# Patient Record
Sex: FEMALE | Race: WHITE | NOT HISPANIC OR LATINO | RURAL
[De-identification: names, ages, dates, MRNs, and addresses within clinical notes are randomized per-mention and may not be internally consistent; named-entity substitution may affect disease eponyms.]

---

## 2017-10-04 ENCOUNTER — OFFICE (OUTPATIENT)
Dept: RURAL CLINIC 3 | Facility: CLINIC | Age: 47
End: 2017-10-04
Payer: COMMERCIAL

## 2017-10-04 VITALS
HEIGHT: 70 IN | HEART RATE: 90 BPM | DIASTOLIC BLOOD PRESSURE: 76 MMHG | SYSTOLIC BLOOD PRESSURE: 113 MMHG | WEIGHT: 199 LBS

## 2017-10-04 DIAGNOSIS — R12 HEARTBURN: ICD-10-CM

## 2017-10-04 DIAGNOSIS — R13.10 DYSPHAGIA, UNSPECIFIED: ICD-10-CM

## 2017-10-04 DIAGNOSIS — R10.13 EPIGASTRIC PAIN: ICD-10-CM

## 2017-10-04 PROCEDURE — 99203 OFFICE O/P NEW LOW 30 MIN: CPT | Performed by: NURSE PRACTITIONER

## 2017-10-04 RX ORDER — RANITIDINE 300 MG/1
300 TABLET ORAL
Qty: 30 | Refills: 11 | Status: ACTIVE
Start: 2017-10-04

## 2017-11-01 ENCOUNTER — ON CAMPUS - OUTPATIENT (OUTPATIENT)
Dept: RURAL HOSPITAL 3 | Facility: HOSPITAL | Age: 47
End: 2017-11-01
Payer: COMMERCIAL

## 2017-11-01 DIAGNOSIS — R13.10 DYSPHAGIA, UNSPECIFIED: ICD-10-CM

## 2017-11-01 DIAGNOSIS — K21.9 GASTRO-ESOPHAGEAL REFLUX DISEASE WITHOUT ESOPHAGITIS: ICD-10-CM

## 2017-11-01 DIAGNOSIS — K22.8 OTHER SPECIFIED DISEASES OF ESOPHAGUS: ICD-10-CM

## 2017-11-01 PROCEDURE — 43450 DILATE ESOPHAGUS 1/MULT PASS: CPT | Performed by: INTERNAL MEDICINE

## 2017-11-01 PROCEDURE — 43239 EGD BIOPSY SINGLE/MULTIPLE: CPT | Performed by: INTERNAL MEDICINE

## 2019-01-23 ENCOUNTER — HOSPITAL ENCOUNTER (OUTPATIENT)
Dept: ORTHOPEDIC SURGERY | Facility: CLINIC | Age: 49
Discharge: HOME | End: 2019-01-23
Attending: ORTHOPAEDIC SURGERY | Admitting: ORTHOPAEDIC SURGERY

## 2023-11-24 ENCOUNTER — APPOINTMENT (OUTPATIENT)
Dept: GENERAL RADIOLOGY | Facility: HOSPITAL | Age: 53
End: 2023-11-24
Payer: MEDICAID

## 2023-11-24 ENCOUNTER — HOSPITAL ENCOUNTER (EMERGENCY)
Facility: HOSPITAL | Age: 53
Discharge: HOME | End: 2023-11-24
Attending: EMERGENCY MEDICINE
Payer: MEDICAID

## 2023-11-24 VITALS
OXYGEN SATURATION: 98 % | BODY MASS INDEX: 23.31 KG/M2 | WEIGHT: 157.41 LBS | RESPIRATION RATE: 20 BRPM | TEMPERATURE: 98 F | DIASTOLIC BLOOD PRESSURE: 91 MMHG | HEIGHT: 69 IN | HEART RATE: 90 BPM | SYSTOLIC BLOOD PRESSURE: 143 MMHG

## 2023-11-24 DIAGNOSIS — E86.0 DEHYDRATION: ICD-10-CM

## 2023-11-24 DIAGNOSIS — J11.1 INFLUENZA: Primary | ICD-10-CM

## 2023-11-24 LAB
ANION GAP SERPL CALCULATED.3IONS-SCNC: 14 MMOL/L (ref 5–15)
BASOPHILS # BLD AUTO: 0 10*3/MM3 (ref 0–0.2)
BASOPHILS NFR BLD AUTO: 0.7 % (ref 0–1.5)
BUN SERPL-MCNC: 10 MG/DL (ref 6–20)
BUN/CREAT SERPL: 17.5 (ref 7–25)
CALCIUM SPEC-SCNC: 9.5 MG/DL (ref 8.6–10.5)
CHLORIDE SERPL-SCNC: 99 MMOL/L (ref 98–107)
CO2 SERPL-SCNC: 27 MMOL/L (ref 22–29)
CREAT SERPL-MCNC: 0.57 MG/DL (ref 0.57–1)
DEPRECATED RDW RBC AUTO: 51.6 FL (ref 37–54)
EGFRCR SERPLBLD CKD-EPI 2021: 110.2 ML/MIN/1.73
EOSINOPHIL # BLD AUTO: 0.1 10*3/MM3 (ref 0–0.4)
EOSINOPHIL NFR BLD AUTO: 1.4 % (ref 0.3–6.2)
ERYTHROCYTE [DISTWIDTH] IN BLOOD BY AUTOMATED COUNT: 14.9 % (ref 12.3–15.4)
GLUCOSE SERPL-MCNC: 99 MG/DL (ref 65–99)
HCT VFR BLD AUTO: 43.4 % (ref 34–46.6)
HGB BLD-MCNC: 14.8 G/DL (ref 12–15.9)
LYMPHOCYTES # BLD AUTO: 1.7 10*3/MM3 (ref 0.7–3.1)
LYMPHOCYTES NFR BLD AUTO: 32.8 % (ref 19.6–45.3)
MCH RBC QN AUTO: 32.3 PG (ref 26.6–33)
MCHC RBC AUTO-ENTMCNC: 34 G/DL (ref 31.5–35.7)
MCV RBC AUTO: 95 FL (ref 79–97)
MONOCYTES # BLD AUTO: 0.5 10*3/MM3 (ref 0.1–0.9)
MONOCYTES NFR BLD AUTO: 8.9 % (ref 5–12)
NEUTROPHILS NFR BLD AUTO: 2.9 10*3/MM3 (ref 1.7–7)
NEUTROPHILS NFR BLD AUTO: 56.2 % (ref 42.7–76)
NRBC BLD AUTO-RTO: 0.1 /100 WBC (ref 0–0.2)
PLATELET # BLD AUTO: 278 10*3/MM3 (ref 140–450)
PMV BLD AUTO: 7.2 FL (ref 6–12)
POTASSIUM SERPL-SCNC: 4 MMOL/L (ref 3.5–5.2)
RBC # BLD AUTO: 4.57 10*6/MM3 (ref 3.77–5.28)
SODIUM SERPL-SCNC: 140 MMOL/L (ref 136–145)
WBC NRBC COR # BLD AUTO: 5.2 10*3/MM3 (ref 3.4–10.8)

## 2023-11-24 PROCEDURE — 80048 BASIC METABOLIC PNL TOTAL CA: CPT | Performed by: EMERGENCY MEDICINE

## 2023-11-24 PROCEDURE — 85025 COMPLETE CBC W/AUTO DIFF WBC: CPT | Performed by: EMERGENCY MEDICINE

## 2023-11-24 PROCEDURE — 25810000003 LACTATED RINGERS SOLUTION: Performed by: EMERGENCY MEDICINE

## 2023-11-24 PROCEDURE — 99283 EMERGENCY DEPT VISIT LOW MDM: CPT

## 2023-11-24 PROCEDURE — 71045 X-RAY EXAM CHEST 1 VIEW: CPT

## 2023-11-24 RX ORDER — BROMPHENIRAMINE MALEATE, PSEUDOEPHEDRINE HYDROCHLORIDE, AND DEXTROMETHORPHAN HYDROBROMIDE 2; 30; 10 MG/5ML; MG/5ML; MG/5ML
5 SYRUP ORAL 4 TIMES DAILY PRN
Qty: 118 ML | Refills: 0 | Status: SHIPPED | OUTPATIENT
Start: 2023-11-24

## 2023-11-24 RX ORDER — SODIUM CHLORIDE 0.9 % (FLUSH) 0.9 %
10 SYRINGE (ML) INJECTION AS NEEDED
Status: DISCONTINUED | OUTPATIENT
Start: 2023-11-24 | End: 2023-11-24 | Stop reason: HOSPADM

## 2023-11-24 RX ADMIN — SODIUM CHLORIDE, POTASSIUM CHLORIDE, SODIUM LACTATE AND CALCIUM CHLORIDE 1000 ML: 600; 310; 30; 20 INJECTION, SOLUTION INTRAVENOUS at 12:39

## 2023-11-24 NOTE — DISCHARGE INSTRUCTIONS
Rest, Tylenol for achiness.  Drink plenty of fluids, follow-up with your doctor in 1 week.  Return for increased pain, high fever, shortness of breath, persistent vomiting or any other concerns.

## 2023-11-24 NOTE — ED PROVIDER NOTES
"Subjective   History of Present Illness  52-year-old female states she has had cough body ache sore throat ear pain and headache over the last 1 week.  States on Tuesday she was diagnosed with influenza and started on Tamiflu.  States despite this she has had ongoing symptoms and feels dehydrated and dry mouth.  She reports no increasing shortness of breath or stiff neck or photophobia.  She states she did not get a flu shot this year.  She states she tested negative for COVID.  She denies vomiting or diarrhea or abdominal pain.  Review of Systems    No past medical history on file.    Allergies   Allergen Reactions    Penicillins Hives     CAN TAKE KEFLEX AND AMOXICILLIN       No past surgical history on file.    No family history on file.    Social History     Socioeconomic History    Marital status: Single     Prior to Admission medications    Not on File     /91 (BP Location: Left arm, Patient Position: Sitting)   Pulse 90   Temp 98 °F (36.7 °C) (Oral)   Resp 20   Ht 175.3 cm (69\")   Wt 71.4 kg (157 lb 6.5 oz)   SpO2 98%   BMI 23.25 kg/m²         Objective   Physical Exam  General: Well-developed female no acute distress awake and alert  Eyes: Pupils round and reactive, sclera nonicteric, conjunctiva normal  HEENT: Mucous membranes somewhat dry, no mucosal swelling  Neck: Supple, no nuchal rigidity, no lymphadenopathy  Respirations: Respirations nonlabored, equal breath sounds bilaterally, clear lungs  Heart regular rate and rhythm, no murmurs rubs or gallops,   Abdomen soft nontender nondistended, no hepatosplenomegaly, no hernia, no mass, normal bowel sounds, no CVA tenderness  Extremities no clubbing cyanosis or edema, calves are symmetric and nontender  Neuro cranial nerves grossly intact, no focal limb deficits  Psych oriented, pleasant affect  Skin no rash, brisk cap refill  Procedures           ED Course      Results for orders placed or performed during the hospital encounter of 11/24/23 "   Basic Metabolic Panel    Specimen: Blood   Result Value Ref Range    Glucose 99 65 - 99 mg/dL    BUN 10 6 - 20 mg/dL    Creatinine 0.57 0.57 - 1.00 mg/dL    Sodium 140 136 - 145 mmol/L    Potassium 4.0 3.5 - 5.2 mmol/L    Chloride 99 98 - 107 mmol/L    CO2 27.0 22.0 - 29.0 mmol/L    Calcium 9.5 8.6 - 10.5 mg/dL    BUN/Creatinine Ratio 17.5 7.0 - 25.0    Anion Gap 14.0 5.0 - 15.0 mmol/L    eGFR 110.2 >60.0 mL/min/1.73   CBC Auto Differential    Specimen: Blood   Result Value Ref Range    WBC 5.20 3.40 - 10.80 10*3/mm3    RBC 4.57 3.77 - 5.28 10*6/mm3    Hemoglobin 14.8 12.0 - 15.9 g/dL    Hematocrit 43.4 34.0 - 46.6 %    MCV 95.0 79.0 - 97.0 fL    MCH 32.3 26.6 - 33.0 pg    MCHC 34.0 31.5 - 35.7 g/dL    RDW 14.9 12.3 - 15.4 %    RDW-SD 51.6 37.0 - 54.0 fl    MPV 7.2 6.0 - 12.0 fL    Platelets 278 140 - 450 10*3/mm3    Neutrophil % 56.2 42.7 - 76.0 %    Lymphocyte % 32.8 19.6 - 45.3 %    Monocyte % 8.9 5.0 - 12.0 %    Eosinophil % 1.4 0.3 - 6.2 %    Basophil % 0.7 0.0 - 1.5 %    Neutrophils, Absolute 2.90 1.70 - 7.00 10*3/mm3    Lymphocytes, Absolute 1.70 0.70 - 3.10 10*3/mm3    Monocytes, Absolute 0.50 0.10 - 0.90 10*3/mm3    Eosinophils, Absolute 0.10 0.00 - 0.40 10*3/mm3    Basophils, Absolute 0.00 0.00 - 0.20 10*3/mm3    nRBC 0.1 0.0 - 0.2 /100 WBC     XR Chest 1 View    Result Date: 11/24/2023  No radiographic findings of acute cardiopulmonary abnormality. Electronically Signed: Kingsley Bryant  11/24/2023 12:48 PM EST  Workstation ID: [id number]                                          Medical Decision Making  Patient presents with ongoing malaise and achiness and flulike symptoms with history of recent flu diagnosis.  Differential diagnosis including secondary pneumonia, dehydration, bacteremia      Patient has no leukocytosis or fever distress bacteremia, her x-ray was negative for pneumonia.  She did appear somewhat dehydrated on physical exam was ordered some IV fluids.  On reexamination she continues to have  no apparent respiratory distress.  She was advised of findings.  She is prescribed Rohm for DM for her cough congestion.  We discussed some supportive care measures.  She is discharged in good condition was given warning signs for return.    Problems Addressed:  Dehydration: complicated acute illness or injury  Influenza: complicated acute illness or injury    Amount and/or Complexity of Data Reviewed  Labs: ordered. Decision-making details documented in ED Course.     Details: CBC and Chem-7 normal  Radiology: ordered and independent interpretation performed.     Details: My independent interpretation of chest x-ray image no pneumonia    Risk  Prescription drug management.        Final diagnoses:   Influenza   Dehydration       ED Disposition  ED Disposition       ED Disposition   Discharge    Condition   Stable    Comment   --               Yury Soto MD  [address] [place] [address] [address] [place] [address] [phone]    Schedule an appointment as soon as possible for a visit in 1 week           Medication List        New Prescriptions      brompheniramine-pseudoephedrine-DM 30-2-10 MG/5ML syrup  Take 5 mL by mouth 4 (Four) Times a Day As Needed for Congestion or Cough.               Where to Get Your Medications        These medications were sent to [facility] - [place], [place] - [address]  - [phone]  - [phone] FX  [address]  [place] [address]      Phone: [phone]   brompheniramine-pseudoephedrine-DM 30-2-10 MG/5ML syrup            Abdelrahman Nur MD  11/24/23 [id number]

## 2023-11-24 NOTE — Clinical Note
[facility] EMERGENCY DEPARTMENT  [address] [place] [address]  Phone: [phone]    Kayla Huber was seen and treated in our emergency department on 11/24/2023.  She may return to work on 11/27/2023.         Thank you for choosing [facility].    Chantal Lopez RN

## 2024-02-27 ENCOUNTER — OFFICE VISIT (OUTPATIENT)
Dept: FAMILY MEDICINE CLINIC | Facility: CLINIC | Age: 54
End: 2024-02-27
Payer: MEDICAID

## 2024-02-27 ENCOUNTER — HOSPITAL ENCOUNTER (OUTPATIENT)
Dept: GENERAL RADIOLOGY | Facility: HOSPITAL | Age: 54
Discharge: HOME | End: 2024-02-27
Payer: MEDICAID

## 2024-02-27 ENCOUNTER — TELEPHONE (OUTPATIENT)
Dept: FAMILY MEDICINE CLINIC | Facility: CLINIC | Age: 54
End: 2024-02-27

## 2024-02-27 ENCOUNTER — PATIENT ROUNDING (BHMG ONLY) (OUTPATIENT)
Dept: FAMILY MEDICINE CLINIC | Facility: CLINIC | Age: 54
End: 2024-02-27
Payer: MEDICAID

## 2024-02-27 VITALS
HEART RATE: 107 BPM | OXYGEN SATURATION: 96 % | RESPIRATION RATE: 16 BRPM | HEIGHT: 69 IN | BODY MASS INDEX: 23.37 KG/M2 | TEMPERATURE: 97.8 F | DIASTOLIC BLOOD PRESSURE: 68 MMHG | WEIGHT: 157.8 LBS | SYSTOLIC BLOOD PRESSURE: 110 MMHG

## 2024-02-27 DIAGNOSIS — M25.512 CHRONIC LEFT SHOULDER PAIN: ICD-10-CM

## 2024-02-27 DIAGNOSIS — G89.29 CHRONIC BILATERAL LOW BACK PAIN WITH RIGHT-SIDED SCIATICA: Primary | ICD-10-CM

## 2024-02-27 DIAGNOSIS — G89.29 CHRONIC LEFT SHOULDER PAIN: ICD-10-CM

## 2024-02-27 DIAGNOSIS — F17.200 CURRENT SMOKER: ICD-10-CM

## 2024-02-27 DIAGNOSIS — M54.41 CHRONIC BILATERAL LOW BACK PAIN WITH RIGHT-SIDED SCIATICA: Primary | ICD-10-CM

## 2024-02-27 PROCEDURE — 73030 X-RAY EXAM OF SHOULDER: CPT

## 2024-02-27 PROCEDURE — 99204 OFFICE O/P NEW MOD 45 MIN: CPT | Performed by: STUDENT IN AN ORGANIZED HEALTH CARE EDUCATION/TRAINING PROGRAM

## 2024-02-27 PROCEDURE — 72100 X-RAY EXAM L-S SPINE 2/3 VWS: CPT

## 2024-02-27 RX ORDER — RIZATRIPTAN BENZOATE 10 MG/1
TABLET ORAL
COMMUNITY
Start: 2024-01-18

## 2024-02-27 RX ORDER — DULOXETIN HYDROCHLORIDE 30 MG/1
30 CAPSULE, DELAYED RELEASE ORAL
COMMUNITY
Start: 2023-08-23

## 2024-02-27 RX ORDER — VARENICLINE TARTRATE 1 MG/1
1 TABLET, FILM COATED ORAL 2 TIMES DAILY
Qty: 60 TABLET | Refills: 3 | Status: SHIPPED | OUTPATIENT
Start: 2024-02-27

## 2024-02-27 RX ORDER — VARENICLINE TARTRATE 0.5 (11)-1
KIT ORAL
COMMUNITY
Start: 2023-08-23 | End: 2024-02-27

## 2024-02-27 RX ORDER — ROPINIROLE 2 MG/1
TABLET, FILM COATED ORAL
COMMUNITY
Start: 2024-02-26

## 2024-02-27 RX ORDER — VARENICLINE TARTRATE 0.5 MG/1
TABLET, FILM COATED ORAL
Qty: 11 TABLET | Refills: 0 | Status: SHIPPED | OUTPATIENT
Start: 2024-02-27 | End: 2024-03-05

## 2024-02-27 RX ORDER — TIZANIDINE 2 MG/1
2 TABLET ORAL NIGHTLY PRN
Qty: 30 TABLET | Refills: 1 | Status: SHIPPED | OUTPATIENT
Start: 2024-02-27

## 2024-02-27 RX ORDER — ALBUTEROL SULFATE 90 UG/1
2 AEROSOL, METERED RESPIRATORY (INHALATION)
COMMUNITY
Start: 2023-11-21

## 2024-02-27 NOTE — PROGRESS NOTES
Chief Complaint  No chief complaint on file.      Subjective    History of Present Illness   {CC  Problem List  Visit Diagnosis   Encounters  Notes  Medications  Labs  Result Review Imaging  Media :23}     Kayla Huber presents to [facility] for   Kayla Huber is a 53 y.o. female here for her annual physical with me. Kayla is here for coordination of medical care, to discuss health maintenance, disease prevention as well as to followup on medical problems.     Annual Physical Exam  Patient's last Physical Exam was ***. Patient's medical history, medications and allergy lists were reviewed and updated.  Activity level is {desc; exercise frequency:54786}.   Exercises {NUMBERS 0-7:05252} per week.   Appetite is {Good Fair Poor:36385}.   Feels {DESC; WELL/FAIRLY WELL/POORLY:46445} with {Desc; few/many/moderate amount:93206} complaints.   Energy level is {Good Fair Poor:74606}.   Sleeps {DESC; WELL/FAIRLY WELL/POORLY:78838}.   Patient's last colonoscopy was ***. She is advised to repeat in {#:16957}.   Patient's last mammogram was ***.   Patient is doing routine self skin exam {monthly:83272}.   Patient is doing routine self-breast exams {monthly:04337}  Patient's menstrual cycles are:  Last pap smear was done:          Preventative:            No family history on file.         No past surgical history on file.    There is no problem list on file for this patient.        Current Outpatient Medications:     brompheniramine-pseudoephedrine-DM 30-2-10 MG/5ML syrup, Take 5 mL by mouth 4 (Four) Times a Day As Needed for Congestion or Cough., Disp: 118 mL, Rfl: 0    Objective   There were no vitals taken for this visit.  BP Readings from Last 3 Encounters:   11/24/23 143/91     Wt Readings from Last 3 Encounters:   11/24/23 71.4 kg (157 lb 6.5 oz)     Physical Exam        Assessment and Plan   There are no diagnoses linked to this encounter.       Follow Up   - 1 year  for annual physical exam      The patient was counseled regarding nutrition, physical activity, healthy weight, injury prevention, immunizations and preventative health screenings. Expected course, medications, and adverse effects discussed.  Call or return if worsening or persistent symptoms.  I wore protective equipment throughout this patient encounter including a mask and eye protection.   The complete contents of the Assessment and Plan and Data / Lab Results as documented above have been reviewed and addressed by myself with the patient today as part of an ongoing evaluation / treatment plan.

## 2024-02-27 NOTE — LETTER
February 27, 2024       No Recipients    Patient: Kayla Huber   YOB: 1970   Date of Visit: 2/27/2024     Dear Kayla Huber:       Kayla Huber has established care with me.  She has been seeing a chiropractor for chronic shoulder and low back pain.  She does not feel that she is able to return to work just yet.  She has an appointment with me on 3/5/2024 to fill out [place] paperwork.  Patient needs to be excused from work from 2/27/2024 until 3/5/2024 when we can complete FMLA paperwork.         Sincerely,    Chantal Benitez, DO

## 2024-02-27 NOTE — TELEPHONE ENCOUNTER
Patient is scheduled for 3/5/24 at 4:30 to douglas out FMLA paperwork. Please write a letter excusing her until that visit.

## 2024-02-27 NOTE — PROGRESS NOTES
February 27, 2024    Hello, may I speak with Kalya Huber?    My name is Faith Bonner     I am  with [facility] [address]  [facility]  [address] [place] [address] [address]  [place] [address].    Before we get started may I verify your date of birth? 1970    I am calling to officially welcome you to our practice and ask about your recent visit. Is this a good time to talk? yes    Tell me about your visit with us. What things went well?  good visit       We're always looking for ways to make our patients' experiences even better. Do you have recommendations on ways we may improve?  no    Overall were you satisfied with your first visit to our practice? yes       I appreciate you taking the time to speak with me today. Is there anything else I can do for you? no      Thank you, and have a great day.

## 2024-02-27 NOTE — PATIENT INSTRUCTIONS
For Muscular or joint pains, use:  -  heat/ice, voltaren gel, icy/hot, biofreeze, lidocaine gel,   For muscular pain: TENS unit ($20-$30 off [employer]), bengay, gentle stretching    - sending in muscle relaxer for as needed use

## 2024-02-27 NOTE — PROGRESS NOTES
Subjective   Kayla Huber is a 53 y.o. female.   Chief Complaint   Patient presents with    [facility]     Pt transferring from Dr. Yury Soto      Back Pain    Nicotine Dependence    Shoulder Pain     Left         History of Present Illness       Low back pain/chronic left shoulder pain  -Started:  2 months ago  -Low back pain: Pain is constant, sharp, burning, stabbing, radiates down right leg, hands cramping  -Left shoulder pain: Pain is constant, sharp, radiates to left side of neck, numbness and tingling bilateral arms  -Treatment:  Pt seeing Chiropractor Dr. 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  Patient states that her chiropractor gave her off 2 weeks and tomorrow (2/28/2024) is the last day.  Patient is concerned about what she needs to do for work to protect her job and allow her more time off to recover    Smoking cessation:  -Pt would like to discuss medication options  -Pt smokes half pack cigarettes daily.  -Has used Varenicline Tartrate in the past with improvement (has been 1 year, did well and stopped for a few months. Brother moved in with her, he's a smoker and she restartd. He is no longer living with her)            Preventative  - Pt will provide name of company that done mammogram at a later date  -Offered Prevnar 20, influenza vaccines, but pt declines,   -Offered Shingrix vaccine, but pt requests to do at a later date  -Offered colonoscopy, but pt requests to do at a later date      The following portions of the patient's history were reviewed and updated as appropriate: allergies, current medications, past family  "history, past medical history, past social history, past surgical history, and problem list.    There is no problem list on file for this patient.      Current Outpatient Medications on File Prior to Visit   Medication Sig Dispense Refill    DULoxetine (CYMBALTA) 30 MG capsule Take 1 capsule by mouth.      Rimegepant Sulfate (NURTEC) 75 MG tablet dispersible tablet Take  by mouth.      rizatriptan (MAXALT) 10 MG tablet       rOPINIRole (REQUIP) 2 MG tablet       [DISCONTINUED] topiramate (TOPAMAX) 200 MG tablet       [DISCONTINUED] Varenicline Tartrate, Starter, 0.5 MG X 11 & 1 MG X 42 tablet therapy pack Take  by mouth.      albuterol sulfate  (90 Base) MCG/ACT inhaler Inhale 2 puffs. (Patient not taking: Reported on 2/27/2024)      [DISCONTINUED] brompheniramine-pseudoephedrine-DM 30-2-10 MG/5ML syrup Take 5 mL by mouth 4 (Four) Times a Day As Needed for Congestion or Cough. 118 mL 0     No current facility-administered medications on file prior to visit.     Current outpatient and discharge medications have been reconciled for the patient.  Reviewed by: Chantal Benitez DO      Allergies   Allergen Reactions    Penicillins Hives     CAN TAKE KEFLEX AND AMOXICILLIN         Objective   Visit Vitals  /68 (BP Location: Right arm, Patient Position: Sitting, Cuff Size: Adult)   Pulse 107   Temp 97.8 °F (36.6 °C) (Skin)   Resp 16   Ht 175.3 cm (69\")   Wt 71.6 kg (157 lb 12.8 oz)   SpO2 96%   BMI 23.30 kg/m²       Physical Exam  Constitutional:       Comments: - Patient is in a back brace   HENT:      Head: Normocephalic and atraumatic.   Eyes:      Conjunctiva/sclera: Conjunctivae normal.   Musculoskeletal:      Comments: - Slow but normal range of motion for bilateral shoulders.  Negative Denilson's test, normal lift off test, negative drop arm test, normal and symmetrical internal and external rotations of the shoulders bilaterally  -AGR of the paraspinal musculature of the low back showed diffuse somatic " dysfunction   Neurological:      General: No focal deficit present.      Mental Status: She is alert and oriented to person, place, and time.   Psychiatric:         Mood and Affect: Mood normal.         Behavior: Behavior normal.           Diagnoses and all orders for this visit:    1. Chronic bilateral low back pain with right-sided sciatica (Primary)  -     XR Spine Lumbar 2 or 3 View  -     Ambulatory Referral to Physical Therapy Evaluate and treat: Therapy order printed, signed and handed to patient to take to facility of choice  -     tiZANidine (ZANAFLEX) 2 MG tablet; Take 1 tablet by mouth At Night As Needed for Muscle Spasms.  Dispense: 30 tablet; Refill: 1.  Did tell patient that muscle relaxers can make some people feel disoriented and off balance.  Told her not to operate machinery or be anywhere where she could lose her balance and herself when she tries this out until she knows how she react to the muscle relaxer.  -Also provided patient with conservative over-the-counter measures for muscle pains via after visit summary    -In regards to patient's work.  I asked patient if she needed to have FMLA paperwork done (she would make an appointment and we would fill this out together).  Patient's not sure if they would allow her to do FMLA paperwork or not since she is asked for so much time off already.   - Because patient's excuse for work ends tomorrow, asked her to call her work and see what they want from her in regards to a doctor's note/FMLA paperwork and to call us back to let us know.  -Should they suggest FMLA paperwork, will give patient a work excuse up to the appointment (that she will make) for us to go over her FMLA paperwork and fill this out together.    2. Chronic left shoulder pain  -     XR Shoulder 2+ View Left  -     Ambulatory Referral to Physical Therapy Evaluate and treat: Therapy order printed, signed and handed to patient to take to facility of choice  -     tiZANidine (ZANAFLEX) 2  MG tablet; Take 1 tablet by mouth At Night As Needed for Muscle Spasms.  Dispense: 30 tablet; Refill: 1  - Also provided patient with conservative over-the-counter measures for muscle pains via after visit summary    3. Current smoker  -Told patient that 3 months is a standard course for smoking cessation.  We will check in at about that time to see if she needs an extension or if she is fine without an extension  -  varenicline (Chantix) 0.5 MG tablet; Take 1 tablet by mouth Daily for 3 days, THEN 1 tablet 2 (Two) Times a Day for 4 days. Afterward, start taking 1mg two times a day  Dispense: 11 tablet; Refill: 0  -     varenicline (Chantix Continuing Month Blane) 1 MG tablet; Take 1 tablet by mouth 2 (Two) Times a Day.  Dispense: 60 tablet; Refill: 3             Follow Up  - 4-6 weeks chronic for left shoulder and low back pain  - 3 months smoking cessation and physical exam    Expected course, medications, and adverse effects discussed as appropriate.  Call or return if worsening or persistent symptoms.  I wore protective equipment throughout this patient encounter to include mask and eye protection. Hand hygiene was performed before donning protective equipment and after removal when leaving the room.    This document is intended for medical expert use only. Reading of this document by patients and/or patient's family without participating medical staff guidance may result in misinterpretation and unintended morbidity. Any interpretation of such data is the responsibility of the patient and/or family member responsible for the patient in concert with their primary or specialist providers, not to be left for sources of online searches such as [employer], [employer] or similar queries. Relying on these approaches to knowledge may result in misinterpretation, misguided goals of care and even death should patients or family members try recommendations outside of the realm of professional medical care.

## 2024-02-28 NOTE — TELEPHONE ENCOUNTER
Patient saw me today for chronic shoulder and low back pain.  She has been seeing chiropractor and has been off work for a few weeks.  Patient does not feel like she can go back to work.  Told patient to call her work to see what she needs to do in order to protect her job.  We received a fax from her work for [facility] paperwork to be completed.    Created work excuse from 2/27/2024 until 3/5/2024 (the date she has scheduled to go over and fill out her [facility] paperwork together).  Did not see an option for me to forward it to patient's MyChart, printed letter, signed and placed at MAs desk for patient to come and  at her convenience

## 2024-02-29 ENCOUNTER — TELEPHONE (OUTPATIENT)
Dept: FAMILY MEDICINE CLINIC | Facility: CLINIC | Age: 54
End: 2024-02-29
Payer: MEDICAID

## 2024-02-29 NOTE — TELEPHONE ENCOUNTER
ARVIN 02/29/2024, 12:51 pm advised pt work note is ready.    We need to know if she wants to  note or if we need to fax it    Return my call

## 2024-03-05 ENCOUNTER — OFFICE VISIT (OUTPATIENT)
Dept: FAMILY MEDICINE CLINIC | Facility: CLINIC | Age: 54
End: 2024-03-05
Payer: COMMERCIAL

## 2024-03-05 VITALS
WEIGHT: 158 LBS | OXYGEN SATURATION: 97 % | RESPIRATION RATE: 16 BRPM | DIASTOLIC BLOOD PRESSURE: 60 MMHG | HEIGHT: 69 IN | SYSTOLIC BLOOD PRESSURE: 110 MMHG | HEART RATE: 94 BPM | TEMPERATURE: 98 F | BODY MASS INDEX: 23.4 KG/M2

## 2024-03-05 DIAGNOSIS — F41.9 ANXIETY AND DEPRESSION: ICD-10-CM

## 2024-03-05 DIAGNOSIS — R20.0 LEFT ARM NUMBNESS: ICD-10-CM

## 2024-03-05 DIAGNOSIS — G47.00 INSOMNIA, UNSPECIFIED TYPE: ICD-10-CM

## 2024-03-05 DIAGNOSIS — M25.512 CHRONIC LEFT SHOULDER PAIN: ICD-10-CM

## 2024-03-05 DIAGNOSIS — G89.29 CHRONIC MIDLINE LOW BACK PAIN WITH RIGHT-SIDED SCIATICA: Primary | ICD-10-CM

## 2024-03-05 DIAGNOSIS — G43.711 INTRACTABLE CHRONIC MIGRAINE WITHOUT AURA AND WITH STATUS MIGRAINOSUS: ICD-10-CM

## 2024-03-05 DIAGNOSIS — F32.A ANXIETY AND DEPRESSION: ICD-10-CM

## 2024-03-05 DIAGNOSIS — M54.41 CHRONIC MIDLINE LOW BACK PAIN WITH RIGHT-SIDED SCIATICA: Primary | ICD-10-CM

## 2024-03-05 DIAGNOSIS — G89.29 CHRONIC LEFT SHOULDER PAIN: ICD-10-CM

## 2024-03-05 RX ORDER — DULOXETIN HYDROCHLORIDE 60 MG/1
60 CAPSULE, DELAYED RELEASE ORAL DAILY
Qty: 30 CAPSULE | Refills: 1 | Status: SHIPPED | OUTPATIENT
Start: 2024-03-05

## 2024-03-05 RX ORDER — MELOXICAM 7.5 MG/1
7.5 TABLET ORAL DAILY PRN
Qty: 30 TABLET | Refills: 0 | Status: SHIPPED | OUTPATIENT
Start: 2024-03-05

## 2024-03-05 RX ORDER — RAMELTEON 8 MG/1
8 TABLET ORAL NIGHTLY
Qty: 30 TABLET | Refills: 1 | Status: SHIPPED | OUTPATIENT
Start: 2024-03-05

## 2024-03-05 NOTE — PROGRESS NOTES
Subjective   Kayla Huber is a 53 y.o. female.   Chief Complaint   Patient presents with    FMLA     Back and left shoulder pain       History of Present Illness       FMLA:  -Follow-up from 2/27/2024: Patient's low back pain and left shoulder pain started about December 2023, patient has been having constant low back pain (sharp and burning sensation, pain radiates down right leg).  Left shoulder is also having constant sharp pain that radiates up to the left side of the neck, numbness and tingling in both arms  -Patient was seeing chiropractor once a week.  Last appointment did x-rays, gave her tizanidine and gave her referral to physical therapy as well as provided conservative over-the-counter measures for muscle pains  -Patient works at [employer] and has been off work due to her pain.  Patient contacted her work and they sent over [place] paperwork to fill out to protect her job      Low back and shoulder pain  -Follow-up from 2/27/2024: Did x-rays of her low back and left shoulder, gave patient tizanidine and over-the-counter measures to help with muscle pains.  Gave her physical therapy order to take wherever will take her insurance.  -Patient today states that her chiropractor will not even stretch her anymore.  He did paperwork for her and got her off work until April 10  -Low back x-rays came back showing some abnormalities but they are overall mild.  Left shoulder x-ray came back completely normal  -Tizanidine was not helpful so she stopped taking it  -No physical therapy office in [place] takes her insurance so patient is calling around to find out which group will take her insurance so she can take her order in to them and get started  -Patient takes ibuprofen 800 mg daily but it is not helpful for her pain  -Patient takes Cymbalta 30 mg daily for restless leg syndrome    Anxiety/Depression  - Pt goes to [facility] for therapy  -Cymbalta 30 mg daily for RLS but patient understands it also works  for anxiety and depression    Insomnia  - has trouble falling asleep  -Discussed with patient that I would like to increase her Cymbalta so would like to wait on possibly adding another medication that could increase her serotonin for insomnia treatment  -Suggested ramelteon to try.  Patient agreeable    Chronic migraines  -Patient used to see neurology (Dr Meehan) for migraine management and bilateral lower extremity neuropathy.  Then patient started seeing her podiatrist who started treating her neuropathy and she lost touch with neurology  -Last saw neurology about a year ago        The following portions of the patient's history were reviewed and updated as appropriate: allergies, current medications, past family history, past medical history, past social history, past surgical history, and problem list.    Patient Active Problem List   Diagnosis    Intractable chronic migraine without aura and with status migrainosus    Insomnia    Anxiety and depression       Current Outpatient Medications on File Prior to Visit   Medication Sig Dispense Refill    albuterol sulfate  (90 Base) MCG/ACT inhaler Inhale 2 puffs.      Rimegepant Sulfate (NURTEC) 75 MG tablet dispersible tablet Take  by mouth.      rizatriptan (MAXALT) 10 MG tablet       rOPINIRole (REQUIP) 2 MG tablet       varenicline (Chantix) 0.5 MG tablet Take 1 tablet by mouth Daily for 3 days, THEN 1 tablet 2 (Two) Times a Day for 4 days. Afterward, start taking 1mg two times a day 11 tablet 0    [DISCONTINUED] DULoxetine (CYMBALTA) 30 MG capsule Take 1 capsule by mouth.      varenicline (Chantix Continuing Month Blane) 1 MG tablet Take 1 tablet by mouth 2 (Two) Times a Day. (Patient not taking: Reported on 3/5/2024) 60 tablet 3    [DISCONTINUED] tiZANidine (ZANAFLEX) 2 MG tablet Take 1 tablet by mouth At Night As Needed for Muscle Spasms. 30 tablet 1     No current facility-administered medications on file prior to visit.     Current outpatient and  "discharge medications have been reconciled for the patient.  Reviewed by: Chantal Benitez, DO      Allergies   Allergen Reactions    Penicillins Hives     CAN TAKE KEFLEX AND AMOXICILLIN         Objective   Visit Vitals  /60 (BP Location: Right arm, Patient Position: Sitting, Cuff Size: Adult)   Pulse 94   Temp 98 °F (36.7 °C) (Skin)   Resp 16   Ht 175.3 cm (69\")   Wt 71.7 kg (158 lb)   SpO2 97%   BMI 23.33 kg/m²       Physical Exam  Constitutional:       Comments: - Patient alternated from tearful to normal facial expression and voice   HENT:      Head: Normocephalic and atraumatic.   Eyes:      Conjunctiva/sclera: Conjunctivae normal.   Neurological:      General: No focal deficit present.      Mental Status: She is alert and oriented to person, place, and time.   Psychiatric:         Mood and Affect: Mood normal.         Behavior: Behavior normal.           Diagnoses and all orders for this visit:    2. Chronic left shoulder pain/Chronic midline low back pain with right-sided sciatica (Primary)  -   Increase Cymbalta from 30 mg to DULoxetine (CYMBALTA) 60 MG capsule; Take 1 capsule by mouth Daily.  Dispense: 30 capsule; Refill: 1  -     meloxicam (MOBIC) 7.5 MG tablet; Take 1 tablet by mouth Daily As Needed for Moderate Pain.  Dispense: 30 tablet; Refill: 0.  Patient states she stopped taking ibuprofen because it upset her stomach.  Did warn her that NSAIDs will do that.  Told her that if the meloxicam starts upsetting her stomach as well to stop taking it  -Patient will continue looking for a physical therapy office that takes her insurance so she can get started.  When she finds an office that will take her insurance, told her to let me know where that is located and I can put in an additional order so that they can work on her neck as well  -Filled out [place] paperwork, gave patient the originals, we kept the copies to fax over to her workplace and scanned into her chart    3. Anxiety and " depression  -Increasing Cymbalta from 30 mg to 60 mg per above    4. Left arm numbness  -     XR Spine Cervical 2 or 3 View to look for overt signs that might indicate nerve impingement at the spinal level  -Discussed with patient that the nerves come out of the neck but also go around and through the muscle in the shoulder and down the arm to give innervation and motor to the arm and hand.  Hopefully physical therapy might help rebalance or lighten up some of the tension in her shoulder and her arm and see if that helps some of the numbness    5. Insomnia, unspecified type  -   Started ramelteon (ROZEREM) 8 MG tablet; Take 1 tablet by mouth Every Night.  Dispense: 30 tablet; Refill: 1    6. Intractable chronic migraine without aura and with status migrainosus  -Discussed with patient that if she saw her neurologist about a year ago, she should still be an active patient.  Encouraged her to call and reconnect with him in regards to managing her migraines but also possibly helping workup neuropathy in her arm    7. BMI 23.0-23.9, adult    I spent 40 minutes caring for Kayla on this date of service. This time includes time spent by me in the following activities: reviewing tests, obtaining and/or reviewing a separately obtained history, counseling and educating the patient/family/caregiver, ordering medications, tests, or procedures, and documenting information in the medical record         Follow Up  -3/28/2024 to follow-up on chronic low back and shoulder pain, check-in on insomnia and anxiety and depression  -5/20/2024 for annual physical exam    Expected course, medications, and adverse effects discussed as appropriate.  Call or return if worsening or persistent symptoms.  I wore protective equipment throughout this patient encounter to include mask and eye protection. Hand hygiene was performed before donning protective equipment and after removal when leaving the room.    This document is intended for  medical expert use only. Reading of this document by patients and/or patient's family without participating medical staff guidance may result in misinterpretation and unintended morbidity. Any interpretation of such data is the responsibility of the patient and/or family member responsible for the patient in concert with their primary or specialist providers, not to be left for sources of online searches such as [employer], [employer] or similar queries. Relying on these approaches to knowledge may result in misinterpretation, misguided goals of care and even death should patients or family members try recommendations outside of the realm of professional medical care.

## 2024-03-14 ENCOUNTER — TELEPHONE (OUTPATIENT)
Dept: FAMILY MEDICINE CLINIC | Facility: CLINIC | Age: 54
End: 2024-03-14
Payer: COMMERCIAL

## 2024-03-14 NOTE — TELEPHONE ENCOUNTER
Caller: Kayla Huber    Relationship: Self    Best call back number: [phone]    Which medication are you concerned about:   ramelteon (ROZEREM) 8 MG tablet  8 mg, Nightly       Who prescribed you this medication: DR. IGLESIAS     When did you start taking this medication: NOT STARTED     What are your concerns: PATIENT SAID THIS MEDICATION COSTS $400 WITHOUT INSURANCE, SHE SAID HER INSURANCE IS REQUESTING A PRIOR AUTHORIZATION     How long have you had these concerns: N/A

## 2024-03-15 NOTE — TELEPHONE ENCOUNTER
"Submitted prior auth online and it stated the following:  \"Submit Bill To Other Processor Or Primary Payer\" does patient have another insurance? We have [employer] insurance from  3/5/24 I tried to get the prior auth through.  "

## 2024-03-18 ENCOUNTER — TELEPHONE (OUTPATIENT)
Dept: FAMILY MEDICINE CLINIC | Facility: CLINIC | Age: 54
End: 2024-03-18
Payer: COMMERCIAL

## 2024-03-18 NOTE — TELEPHONE ENCOUNTER
Caller: Kayla Huber    Relationship: Self    Best call back number: [phone]*    What was the call regarding: PATIENT CALLING STATING THAT [facility] WILL BE SENDING DR. DUNCAN IGLESIAS, SHORT TERM DISABILITY PAPERWORK, ATTENDING PHYSICIAN STATEMENT, THAT WILL NEED TO BE COMPLETED AND RETURNED BACK.

## 2024-03-18 NOTE — TELEPHONE ENCOUNTER
HUB MAY RELAY:    Called patient and no answer. She needs to be sure the pharmacy is running this through correct insurance and that we have correct insurance. If they are running it through correct insurance and we have correct insurance she needs to let us know and then  can consider an alternative medication.

## 2024-03-25 NOTE — TELEPHONE ENCOUNTER
Called and left message. This paper work has to be completed in an in person visit. We can address this at her 3/28/2024 appointment. However I also need additional information on this. We received this paper work from the front completely blank. What is this for? Low back pain? And what kind of leave is this for? Was she out for consecutive time? Is she asking for intermittent leave?    I can not even start this paperwork without more info.

## 2024-03-26 ENCOUNTER — HOSPITAL ENCOUNTER (OUTPATIENT)
Dept: GENERAL RADIOLOGY | Facility: HOSPITAL | Age: 54
Discharge: HOME | End: 2024-03-26
Admitting: STUDENT IN AN ORGANIZED HEALTH CARE EDUCATION/TRAINING PROGRAM
Payer: COMMERCIAL

## 2024-03-26 ENCOUNTER — TELEPHONE (OUTPATIENT)
Dept: FAMILY MEDICINE CLINIC | Facility: CLINIC | Age: 54
End: 2024-03-26
Payer: COMMERCIAL

## 2024-03-26 PROCEDURE — 72040 X-RAY EXAM NECK SPINE 2-3 VW: CPT

## 2024-03-26 NOTE — TELEPHONE ENCOUNTER
Caller: Kayla Huber    Relationship: Self    Best call back number:[phone]    PATIENT SPOKE TO [facility] REGARDING THE SHORT TERM DISABILITY FORMS THEY SENT US ON  3/18/24.    THEY HAVE NOT RECEIVED A RESPONSE FROM OUR OFFICE.      THIS IS URGENT FOR PATIENT DUE TO HER HAVING BEEN OUT OF WORK SINCE 2/10/24 AND SHE IS TRYING TO GET APPROVED FOR THIS DISABILITY SO SHE CAN HAVE A FORM OF INCOME AGAIN.    PLEASE ADVISE

## 2024-03-26 NOTE — TELEPHONE ENCOUNTER
ARVIN 03/26/2024, 4:40 pm advising pt this has to be an office visit to be completed.    Pt has an appt 03/28/2024.    Also advised her if insurance company has questions about this they can contact our office

## 2024-03-29 ENCOUNTER — TELEPHONE (OUTPATIENT)
Dept: FAMILY MEDICINE CLINIC | Facility: CLINIC | Age: 54
End: 2024-03-29
Payer: COMMERCIAL

## 2024-03-29 DIAGNOSIS — G89.29 CHRONIC MIDLINE LOW BACK PAIN WITH RIGHT-SIDED SCIATICA: ICD-10-CM

## 2024-03-29 DIAGNOSIS — G89.29 CHRONIC LEFT SHOULDER PAIN: ICD-10-CM

## 2024-03-29 DIAGNOSIS — M25.512 CHRONIC LEFT SHOULDER PAIN: ICD-10-CM

## 2024-03-29 DIAGNOSIS — M54.41 CHRONIC MIDLINE LOW BACK PAIN WITH RIGHT-SIDED SCIATICA: ICD-10-CM

## 2024-03-29 NOTE — TELEPHONE ENCOUNTER
ARVIN 03/28/2024 and 03/29/2024 advising pt we have an opening for today.  Requested pt call and schedule as soon as possible

## 2024-04-01 RX ORDER — MELOXICAM 7.5 MG/1
TABLET ORAL
Qty: 30 TABLET | Refills: 0 | OUTPATIENT
Start: 2024-04-01

## 2024-04-01 NOTE — TELEPHONE ENCOUNTER
Received refill  request for meloxicam 7.5 mg.  Sent in 30 tablets 3 weeks ago to be used as needed for pain.  This appears to be an automatic refill request so we will decline.  Will be happy to refill if patient calls requesting more

## 2024-04-04 NOTE — PROGRESS NOTES
Subjective   Kayla Huber is a 53 y.o. female.   Chief Complaint   Patient presents with    Chronic midline low back pain with right sided sciatica    Chronic left shoulder pain    Short Term Disabilty       History of Present Illness     Chronic midline low back pain with right sided sciatica/  Chronic left shoulder pain:  -Follow up from 03/05/2024: Increase Cymbalta from 30 mg to 60 mg, gave meloxicam 7.5 mg to use as needed, patient was to continue looking for a physical therapy office that took her insurance.  Cervical spine x-ray taken due to left arm numbness  -Medication:  Duloxetine 60 mg 1 capsule daily  -Pt going to [facility] 2 days per week for 4 weeks.  She states she has 2 more appointments left with physical therapy.  She also did cupping with them  -She states ice packs help her shoulder pain a lot  -Tizanidine, meloxicam, Biofreeze all were not helpful  -She is noticed some improvement in her low back but no improvement in her left shoulder pain  -Patient has not tried a TENS unit or lidocaine patches    Insomnia  -Follow-up from 3/5/2024: Had started patient on duloxetine and did not want to give her too much serotonin at once, sent in ramelteon 8 mg nightly  -Patient states that this was far too expensive to  and she is asking for an alternative    Short Term Disability  -Follow up from 03/05/2024: Filled out [place] paperwork because patient was stating that her low back and left shoulder pain were so intense that she could not go to work.   -Patient later stated that she needed short-term disability paperwork filled out so she brought it in today      The following portions of the patient's history were reviewed and updated as appropriate: allergies, current medications, past family history, past medical history, past social history, past surgical history, and problem list.    Patient Active Problem List   Diagnosis    Intractable chronic migraine  "without aura and with status migrainosus    Insomnia    Anxiety and depression       Current Outpatient Medications on File Prior to Visit   Medication Sig Dispense Refill    albuterol sulfate  (90 Base) MCG/ACT inhaler Inhale 2 puffs.      meloxicam (MOBIC) 7.5 MG tablet Take 1 tablet by mouth Daily As Needed for Moderate Pain. 30 tablet 0    rizatriptan (MAXALT) 10 MG tablet       rOPINIRole (REQUIP) 2 MG tablet       varenicline (Chantix Continuing Month Blane) 1 MG tablet Take 1 tablet by mouth 2 (Two) Times a Day. 60 tablet 3    [DISCONTINUED] DULoxetine (CYMBALTA) 60 MG capsule Take 1 capsule by mouth Daily. 30 capsule 1    [DISCONTINUED] ramelteon (ROZEREM) 8 MG tablet Take 1 tablet by mouth Every Night. 30 tablet 1    [DISCONTINUED] Rimegepant Sulfate (NURTEC) 75 MG tablet dispersible tablet Take  by mouth.       No current facility-administered medications on file prior to visit.     Current outpatient and discharge medications have been reconciled for the patient.  Reviewed by: Chantal Benitez DO      Allergies   Allergen Reactions    Penicillins Hives     CAN TAKE KEFLEX AND AMOXICILLIN         Objective   Visit Vitals  /64 (BP Location: Right arm, Patient Position: Sitting, Cuff Size: Adult)   Pulse 96   Temp 98 °F (36.7 °C) (Skin)   Resp 16   Ht 175.3 cm (69\")   Wt 72.4 kg (159 lb 9.6 oz)   SpO2 99%   BMI 23.57 kg/m²       Physical Exam  HENT:      Head: Normocephalic and atraumatic.   Eyes:      Conjunctiva/sclera: Conjunctivae normal.   Neurological:      General: No focal deficit present.      Mental Status: She is alert and oriented to person, place, and time.   Psychiatric:         Mood and Affect: Mood normal.         Behavior: Behavior normal.           Diagnoses and all orders for this visit:    1. Chronic left shoulder pain (Primary)  -Increasing duloxetine from 60 mg to 90 mg (sent in 30 tablets with 1 refill each of 60 mg tablets and 30 mg tablets to take together daily)    " -Patient has 1 more week of physical therapy left, noted some improvement with low back pain but no improvement in left shoulder  -Patient has tried muscle relaxers and NSAIDs without improvement (tried cyclobenzaprine, tizanidine, meloxicam, Biofreeze, duloxetine)  -Patient's low back x-rays showed some degenerative disc disease, cervical x-rays showed some mild disc disease unchanged relatively from 2018.  Left shoulder x-ray came back completely normal  -Patient has failed conservative management, will go ahead and order an MRI of the left shoulder  -     MRI Shoulder Left Without Contrast  -Sports med referral: Dr. Damian Marte (sports med/Ortho)  -Filled out patient's short-term disability paperwork in office.  Will scanned to the chart and fax copy to the number listed on the paperwork  -Patient had not tried TENS unit or lidocaine patches.  Recommend both of these to see if it would help with pain    3. Insomnia, unspecified type  -Stopping ramelteon due to cost.  Told patient to let us know by calling the office if medications are too expensive and we can try an alternative  -Started   traZODone (DESYREL) 50 MG tablet; Take 1 tablet by mouth At Night As Needed for Sleep.  Dispense: 30 tablet; Refill: 1       I spent 56 minutes caring for Kayla on this date of service. This time includes time spent by me in the following activities: reviewing tests, counseling and educating the patient/family/caregiver, ordering medications, tests, or procedures, referring and communicating with other health care professionals, and documenting information in the medical record        Follow Up  - 4-6 weeks for follow up on low back pain/left shoulder pain and insomnia    Expected course, medications, and adverse effects discussed as appropriate.  Call or return if worsening or persistent symptoms.  I wore protective equipment throughout this patient encounter to include mask and eye protection. Hand hygiene was performed  before donning protective equipment and after removal when leaving the room.    This document is intended for medical expert use only. Reading of this document by patients and/or patient's family without participating medical staff guidance may result in misinterpretation and unintended morbidity. Any interpretation of such data is the responsibility of the patient and/or family member responsible for the patient in concert with their primary or specialist providers, not to be left for sources of online searches such as '[employer], [employer] or similar queries. Relying on these approaches to knowledge may result in misinterpretation, misguided goals of care and even death should patients or family members try recommendations outside of the realm of professional medical care.

## 2024-04-05 ENCOUNTER — OFFICE VISIT (OUTPATIENT)
Dept: FAMILY MEDICINE CLINIC | Facility: CLINIC | Age: 54
End: 2024-04-05
Payer: MEDICAID

## 2024-04-05 VITALS
DIASTOLIC BLOOD PRESSURE: 64 MMHG | SYSTOLIC BLOOD PRESSURE: 112 MMHG | HEIGHT: 69 IN | TEMPERATURE: 98 F | HEART RATE: 96 BPM | OXYGEN SATURATION: 99 % | WEIGHT: 159.6 LBS | RESPIRATION RATE: 16 BRPM | BODY MASS INDEX: 23.64 KG/M2

## 2024-04-05 DIAGNOSIS — M54.41 CHRONIC MIDLINE LOW BACK PAIN WITH RIGHT-SIDED SCIATICA: ICD-10-CM

## 2024-04-05 DIAGNOSIS — G47.00 INSOMNIA, UNSPECIFIED TYPE: ICD-10-CM

## 2024-04-05 DIAGNOSIS — G89.29 CHRONIC MIDLINE LOW BACK PAIN WITH RIGHT-SIDED SCIATICA: ICD-10-CM

## 2024-04-05 DIAGNOSIS — M25.512 CHRONIC LEFT SHOULDER PAIN: Primary | ICD-10-CM

## 2024-04-05 DIAGNOSIS — G89.29 CHRONIC LEFT SHOULDER PAIN: Primary | ICD-10-CM

## 2024-04-05 RX ORDER — TRAZODONE HYDROCHLORIDE 50 MG/1
50 TABLET ORAL NIGHTLY PRN
Qty: 30 TABLET | Refills: 1 | Status: SHIPPED | OUTPATIENT
Start: 2024-04-05

## 2024-04-05 RX ORDER — DULOXETIN HYDROCHLORIDE 60 MG/1
60 CAPSULE, DELAYED RELEASE ORAL DAILY
Qty: 30 CAPSULE | Refills: 1 | Status: SHIPPED | OUTPATIENT
Start: 2024-04-05

## 2024-04-05 RX ORDER — DULOXETIN HYDROCHLORIDE 30 MG/1
30 CAPSULE, DELAYED RELEASE ORAL DAILY
Qty: 30 CAPSULE | Refills: 1 | Status: SHIPPED | OUTPATIENT
Start: 2024-04-05

## 2024-04-05 NOTE — PATIENT INSTRUCTIONS
For Muscular or joint pains, use:  -  heat/ice, voltaren gel, icy/hot, biofreeze, lidocaine gel,   -  For muscular pain: TENS unit ($20-$30 off [employer]), bengay, gentle stretching

## 2024-04-12 ENCOUNTER — HOSPITAL ENCOUNTER (OUTPATIENT)
Dept: MRI IMAGING | Facility: HOSPITAL | Age: 54
Discharge: HOME | End: 2024-04-12
Payer: MEDICAID

## 2024-04-12 PROCEDURE — 73221 MRI JOINT UPR EXTREM W/O DYE: CPT

## 2024-04-15 ENCOUNTER — TELEPHONE (OUTPATIENT)
Dept: FAMILY MEDICINE CLINIC | Facility: CLINIC | Age: 54
End: 2024-04-15
Payer: MEDICAID

## 2024-04-15 NOTE — TELEPHONE ENCOUNTER
----- Message from Chantal Benitez DO sent at 4/15/2024 11:02 AM EDT -----  Patient's left shoulder MRI came back showing that the rotator cuff tendons and muscles are irritated and there is some evidence of strain but no tearing.  There are a few findings that may indicate some adhesive capsulitis (also called frozen shoulder which can be pretty painful)  It also shows some mild arthritis in that joint.    Had referred her over to sports medicine for further treatment and evaluation.  It looks like they may have reach out to her but they have not been able to get her.  If we are able to get a hold of her, lets give her the phone number to call them and get her self scheduled

## 2024-04-16 ENCOUNTER — TELEPHONE (OUTPATIENT)
Dept: FAMILY MEDICINE CLINIC | Facility: CLINIC | Age: 54
End: 2024-04-16

## 2024-04-16 ENCOUNTER — OFFICE VISIT (OUTPATIENT)
Dept: FAMILY MEDICINE CLINIC | Facility: CLINIC | Age: 54
End: 2024-04-16
Payer: MEDICAID

## 2024-04-16 VITALS
WEIGHT: 158.8 LBS | TEMPERATURE: 98.9 F | HEART RATE: 106 BPM | RESPIRATION RATE: 16 BRPM | DIASTOLIC BLOOD PRESSURE: 64 MMHG | HEIGHT: 69 IN | BODY MASS INDEX: 23.52 KG/M2 | SYSTOLIC BLOOD PRESSURE: 114 MMHG | OXYGEN SATURATION: 98 %

## 2024-04-16 DIAGNOSIS — Z91.09 ENVIRONMENTAL ALLERGIES: ICD-10-CM

## 2024-04-16 DIAGNOSIS — R10.13 EPIGASTRIC PAIN: ICD-10-CM

## 2024-04-16 DIAGNOSIS — R06.02 SHORTNESS OF BREATH: Primary | ICD-10-CM

## 2024-04-16 DIAGNOSIS — K59.00 CONSTIPATION, UNSPECIFIED CONSTIPATION TYPE: ICD-10-CM

## 2024-04-16 PROCEDURE — 99213 OFFICE O/P EST LOW 20 MIN: CPT | Performed by: STUDENT IN AN ORGANIZED HEALTH CARE EDUCATION/TRAINING PROGRAM

## 2024-04-16 RX ORDER — FAMOTIDINE 10 MG
10 TABLET ORAL 2 TIMES DAILY
Qty: 60 TABLET | Refills: 1 | Status: SHIPPED | OUTPATIENT
Start: 2024-04-16

## 2024-04-16 RX ORDER — FLUTICASONE PROPIONATE 50 MCG
2 SPRAY, SUSPENSION (ML) NASAL DAILY
Qty: 18.2 ML | Refills: 2 | Status: SHIPPED | OUTPATIENT
Start: 2024-04-16

## 2024-04-16 RX ORDER — CETIRIZINE HYDROCHLORIDE 10 MG/1
10 TABLET ORAL DAILY
Qty: 90 TABLET | Refills: 3 | Status: SHIPPED | OUTPATIENT
Start: 2024-04-16

## 2024-04-16 NOTE — PATIENT INSTRUCTIONS
- famotidine 10mg 2 times daily (pepcid AC)    Allergies  -Start over-the-counter daily nondrowsy antihistamine (generic Zyrtec, Claritin or Allegra)  -Start generic Flonase daily  -If the 2 medications above are not helpful, try nasal saline rinses/Prema pot in the mornings before using Flonase    Constipation  - drink 2.7L - 3L of fluid daily  - start over the counter probiotics daily  - increase your fiber intake by 5g daily and slowly progress up  - do miralax daily for 2 weeks at least  - do about 20 minutes daily of aerobic activity

## 2024-04-16 NOTE — PROGRESS NOTES
Subjective   Kayla Huber is a 53 y.o. female.   Chief Complaint   Patient presents with    Shortness of Breath    Abdominal Pain     Epigastric pain         History of Present Illness     Shortness of breath:  -Started:  1 week ago  -Symptoms: Will get lightheaded and dizzy.  Gets occasional headaches and rhinorrhea.  Sensation feels somewhat constant  -Has been a smoker since age 20  -Treatment:   Albuterol Sulfate HFA inhaler with slight improvement    Bloating with nausea:  -Started:   1 week ago  -Symptoms:  epigastric pain, bloating, nausea, bowel movements every 2-3 days, reflux  -Treatment:   Pepto-bismol with no improvement  -Patient describes her stool as type II to type III on the [place] stool chart  -Patient states she has been on a PPI in the past        The following portions of the patient's history were reviewed and updated as appropriate: allergies, current medications, past family history, past medical history, past social history, past surgical history, and problem list.    Patient Active Problem List   Diagnosis    Intractable chronic migraine without aura and with status migrainosus    Insomnia    Anxiety and depression       Current Outpatient Medications on File Prior to Visit   Medication Sig Dispense Refill    albuterol sulfate  (90 Base) MCG/ACT inhaler Inhale 2 puffs.      DULoxetine (CYMBALTA) 30 MG capsule Take 1 capsule by mouth Daily. Take daily with 60mg capsule for total of 90mg daily 30 capsule 1    DULoxetine (CYMBALTA) 60 MG capsule Take 1 capsule by mouth Daily. Take daily with 30mg capsule for total of 90mg daily 30 capsule 1    rizatriptan (MAXALT) 10 MG tablet       rOPINIRole (REQUIP) 2 MG tablet       topiramate (TOPAMAX) 200 MG tablet       traZODone (DESYREL) 50 MG tablet Take 1 tablet by mouth At Night As Needed for Sleep. 30 tablet 1    varenicline (Chantix Continuing Month Blane) 1 MG tablet Take 1 tablet by mouth 2 (Two) Times a Day. 60 tablet 3     No  "current facility-administered medications on file prior to visit.     Current outpatient and discharge medications have been reconciled for the patient.  Reviewed by: Chantal Benitez, DO      Allergies   Allergen Reactions    Penicillins Hives     CAN TAKE KEFLEX AND AMOXICILLIN         Objective   Visit Vitals  /64 (BP Location: Right arm, Patient Position: Sitting, Cuff Size: Adult)   Pulse 106   Temp 98.9 °F (37.2 °C) (Skin)   Resp 16   Ht 175.3 cm (69\")   Wt 72 kg (158 lb 12.8 oz)   SpO2 98%   BMI 23.45 kg/m²       Physical Exam  HENT:      Head: Normocephalic and atraumatic.   Eyes:      Conjunctiva/sclera: Conjunctivae normal.   Cardiovascular:      Rate and Rhythm: Normal rate and regular rhythm.      Heart sounds: Normal heart sounds.   Pulmonary:      Effort: Pulmonary effort is normal. No respiratory distress.      Breath sounds: Normal breath sounds. No wheezing, rhonchi or rales.   Neurological:      General: No focal deficit present.      Mental Status: She is alert and oriented to person, place, and time.   Psychiatric:         Mood and Affect: Mood normal.         Behavior: Behavior normal.           Diagnoses and all orders for this visit:    1. Shortness of breath (Primary)  -   Due to patient's smoking history and shortness of breath, she might have some asthma or COPD starting to come into play.  Sending in a daily inhaler  -   Fluticasone Propionate, Inhal, (FLOVENT DISKUS) 50 MCG/ACT diskus inhaler; Inhale 1 puff 2 (Two) Times a Day.  Dispense: 60 each; Refill: 3    2. Environmental allergies  -A lot of what patient is describing sounds like allergies.  Will have patient start Flonase with cetirizine daily.  If not great improvement after a week or 2 of this intervention, patient to use the inhaler daily.  Also suggested over-the-counter nasal saline rinses  -  Fluticasone Propionate, Inhal, (FLOVENT DISKUS) 50 MCG/ACT diskus inhaler; Inhale 1 puff 2 (Two) Times a Day.  Dispense: 60 each; " Refill: 3  -     cetirizine (zyrTEC) 10 MG tablet; Take 1 tablet by mouth Daily.  Dispense: 90 tablet; Refill: 3  -     fluticasone (FLONASE) 50 MCG/ACT nasal spray; 2 sprays into the nostril(s) as directed by provider Daily.  Dispense: 18.2 mL; Refill: 2    3. Epigastric pain/constipation  -   Patient's abdominal pain could be due to constipation since she is only have a bowel movement every few days.  Gave patient fluid goals fiber goals, recommended probiotics and MiraLAX daily for a few weeks to help with the constipation  -Discussed with patient that these above measures not only good for constipation but also the for steps for IBS should patient have it.  If patient notices a lot of bloating, suggested starting a food diary to see if certain foods seem to aggravate her more  -Patient may also be having some acid reflux issues considering she is describing epigastric pain  -Starting famotidine (PEPCID) 10 MG tablet; Take 1 tablet by mouth 2 (Two) Times a Day.  Dispense: 60 tablet; Refill: 1    4. BMI 23.0-23.9, adult             Follow Up  -5/7/2024 for follow-up on constipation, epigastric pain, allergy/shortness of breath, left shoulder pain and insomnia    Expected course, medications, and adverse effects discussed as appropriate.  Call or return if worsening or persistent symptoms. Hand hygiene was performed before donning protective equipment and after removal when leaving the room.    This document is intended for medical expert use only. Reading of this document by patients and/or patient's family without participating medical staff guidance may result in misinterpretation and unintended morbidity. Any interpretation of such data is the responsibility of the patient and/or family member responsible for the patient in concert with their primary or specialist providers, not to be left for sources of online searches such as norin.tv, [employer]oto or similar queries. Relying on these approaches to knowledge may  result in misinterpretation, misguided goals of care and even death should patients or family members try recommendations outside of the realm of professional medical care.

## 2024-04-18 ENCOUNTER — TELEPHONE (OUTPATIENT)
Dept: FAMILY MEDICINE CLINIC | Facility: CLINIC | Age: 54
End: 2024-04-18
Payer: MEDICAID

## 2024-04-18 NOTE — TELEPHONE ENCOUNTER
Spoke to pt 04/18/2024, 12 noon she states [facility] needs office notes, x-ray results and MRI results faxed to [facility].    All paperwork faxed 04/18/2024

## 2024-04-18 NOTE — TELEPHONE ENCOUNTER
Patient was calling to [facility] that we received additional paperwork from [facility] needing more information.  She said that she also got her voicemail fixed. Please contact her at .  Thanks Rosalind   Asthma    DM (diabetes mellitus)    HTN (hypertension)

## 2024-04-29 ENCOUNTER — OFFICE VISIT (OUTPATIENT)
Dept: ORTHOPEDIC SURGERY | Facility: CLINIC | Age: 54
End: 2024-04-29
Payer: MEDICAID

## 2024-04-29 VITALS — HEIGHT: 69 IN | OXYGEN SATURATION: 98 % | WEIGHT: 158 LBS | BODY MASS INDEX: 23.4 KG/M2 | RESPIRATION RATE: 20 BRPM

## 2024-04-29 DIAGNOSIS — M50.30 DDD (DEGENERATIVE DISC DISEASE), CERVICAL: ICD-10-CM

## 2024-04-29 DIAGNOSIS — M25.512 CHRONIC LEFT SHOULDER PAIN: ICD-10-CM

## 2024-04-29 DIAGNOSIS — G89.29 CHRONIC LEFT SHOULDER PAIN: ICD-10-CM

## 2024-04-29 DIAGNOSIS — M54.12 CERVICAL RADICULAR PAIN: Primary | ICD-10-CM

## 2024-04-29 DIAGNOSIS — M75.02 ADHESIVE CAPSULITIS OF LEFT SHOULDER: ICD-10-CM

## 2024-04-29 DIAGNOSIS — M62.81 MUSCLE WEAKNESS OF LEFT UPPER EXTREMITY: ICD-10-CM

## 2024-04-29 PROCEDURE — 99204 OFFICE O/P NEW MOD 45 MIN: CPT | Performed by: STUDENT IN AN ORGANIZED HEALTH CARE EDUCATION/TRAINING PROGRAM

## 2024-04-29 NOTE — PROGRESS NOTES
"NEW VISIT    Patient: Kayla Huber  ?  YOB: 1970    MRN: [id number]  ?  Chief Complaint   Patient presents with    Left Shoulder - Pain, Initial Evaluation      Frozen shoulder   Been off work since feb 10th 2024   Has tried PT    Pain since December 2023          ?  HPI: Patient 53-year-old female presents today for chronic left shoulder pain as well as left upper extremity weakness.  She states symptoms started end of 2023 without acute injury.  Since that time has had chronic midline neck pain, with left-sided radicular symptoms involving left shoulder, with radicular symptoms into her left hand including numbness, tingling and weakness.  She is also felt that her left digits have been \"cold\" at times.  Symptoms have specifically worsened starting February of this year, and she has not been back to work since February 10, 2024.  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  Regarding the left shoulder, she has noticed weakness, as well as decreased range of motion and stiffness.  Had initial evaluation by PCP with both x-rays and MRI obtained as noted below.  Has also completed course of formal physical therapy over the last month stating 5-6 visits targeting primarily shoulder range of motion and strengthening.  Specifically noticed worsening strength in left upper extremity over the last month.    Allergies:   Allergies   Allergen Reactions    Penicillins Hives     CAN TAKE KEFLEX AND AMOXICILLIN       Past Medical History:   Diagnosis Date    Anxiety     Arthritis of back     Arthritis of neck     Cervical disc disorder     Depression     Fracture of ankle     Fracture of wrist     Fracture, femur     Fracture, fibula     Fracture, foot     Fracture, tibia and fibula     Frozen shoulder     Hip arthrosis     Knee swelling     Lumbosacral disc disease     Neck strain     Periarthritis of shoulder     Scoliosis     Stress fracture     Thoracic " "disc disorder      Past Surgical History:   Procedure Laterality Date    ANKLE OPEN REDUCTION INTERNAL FIXATION  11/1995    CLOSED REDUCTION ANKLE FRACTURE Right     FEMUR FRACTURE SURGERY Right     FIBULA FRACTURE SURGERY Right     FOOT SURGERY      KNEE SURGERY  11/1995    TIBIA FRACTURE SURGERY Right     TONSILLECTOMY       Social History     Occupational History    Not on file   Tobacco Use    Smoking status: Former     Current packs/day: 0.50     Average packs/day: 0.6 packs/day for 37.2 years (22.5 ttl pk-yrs)     Types: Cigarettes     Start date: 1991     Passive exposure: Current    Smokeless tobacco: Never   Vaping Use    Vaping status: Never Used   Substance and Sexual Activity    Alcohol use: Not Currently     Comment: 1-6 DRINKS PER MONTH    Drug use: Never    Sexual activity: Not Currently     Partners: Male     Birth control/protection: None      Social History     Social History Narrative    Not on file     Family History   Problem Relation Age of Onset    Cancer Maternal Grandmother        Review of Systems  Constitutional: Negative.  Negative for fever.   Musculoskeletal: Positive for joint pain  Skin: Negative.  Negative for rash and wound.    Neurological: Negative for numbness.     Vitals:    04/29/24 1017   Resp: 20   SpO2: 98%   Weight: 71.7 kg (158 lb)   Height: 175.3 cm (69\")        Physical Exam  Constitutional: Patient is oriented to person, place, and time. Appears well-developed and well-nourished.   Head: Normocephalic and atraumatic.   Pulmonary/Chest: Effort normal.   Musculoskeletal:   See detailed exam below   Neurological: Alert and oriented to person, place, and time. No sensory deficit. Coordination normal.   Skin: Skin is warm and dry. Capillary refill takes less than 2 seconds. No rash noted. No erythema.     The left shoulder is without obvious signs of acute bony deformity, swelling, erythema or ecchymosis.  Mild tenderness over anterior joint space.  There is no tenderness at " the AC joint. There is no tenderness at the SC joint.  Active range of motion limited and painful at end range.  Forward flexion 100 degrees, abduction 90 degrees, internal rotation lower lumbar, external rotation 25 degrees.  Passive range of motion slightly improved, forward flexion to 120 degrees, and abduction to 110 degrees with pain at endrange.  Impingement signs are positive with Neer, Hicks, and crossover tests. Instability tests are negative with anterior and posterior drawer, and sulcus test. Speeds and Yergason's tests are negative. St. Francois's test is positive. Denilson's test is positive for pain and weakness.  Rotator cuff strength is with diffuse weakness in all muscle testing 3+/5 compared with contralateral side.       Brief cervical exam.  There is tenderness midline and left-sided facet lower cervical spinous process from C4-C7.  Diminished strength with all manual muscle testing of left upper extremity including deltoid, triceps/biceps, wrist flexors extensors, and interossei.  3+/5 compared to contralateral side.  Triceps, biceps and brachioradialis reflexes 1+ compared with 2+ contralateral side.  Sensation intact to light touch.  Mild diminished sensation to light touch in C5-C6 nerve distribution compared to contralateral side    Diagnostics:  Personally reviewed MRI report and images, performed at [facility] on 4/12/24, summary of impression below:    No acute osseous abnormality.  There is capsulitis and anterior inferior glenohumeral space likely indicating mild adhesive capsulitis.  Mild tendinosis of supraspinatus and subscapularis tendon    XR - 2 Results   I have personally reviewed Results for orders placed in visit on 03/05/24    XR SPINE CERVICAL 2 OR 3 VW 3/27/2024     and my interpretation of the image is as follows: Degenerative disc changes at lower cervical levels.  There is also noted significant facet arthropathy previously across cervical spine.    Results for orders placed  in visit on 02/27/24    XR SHOULDER 2+ VW LEFT 2/28/2024   and my interpretation of the image is as follows: No acute osseous abnormalities.  Normal joint spacing.  No significant degenerative change.       Assessment:  Diagnoses and all orders for this visit:    1. Cervical radicular pain (Primary)  -     Ambulatory Referral to Neurosurgery    2. DDD (degenerative disc disease), cervical  -     Ambulatory Referral to Neurosurgery    3. Muscle weakness of left upper extremity  -     Ambulatory Referral to Neurosurgery    4. Chronic left shoulder pain    5. Adhesive capsulitis of left shoulder        Plan    Above diagnosis and plan discussed with patient in office today.  I did personally review prior imaging including cervical and left shoulder x-ray as well as left shoulder MRI. As noted above.  Discussed no acute osseous abnormality.  There are mild changes consistent with adhesive capsulitis both clinically and on MRI regarding the left shoulder.  However higher clinical suspicion of cervical spine pathology, given noted degenerative change on x-ray, reproducible radicular symptoms on exam today, and significant motor deficit in nondermatomal pattern compared with contralateral side.  There is also mild and C5-6 distribution compared with contralateral side.  As such discussed referral to neurosurgery for further cervical spine workup.  Encouraged both active and passive range of motion of the shoulder at this time to avoid further worsening of adhesive capsulitis.  Otherwise will defer shoulder treatment at this time pending cervical workup.  She continues to have significant shoulder symptoms after neurosurgery workup, could consider intra-articular corticosteroid injection followed by repeat physical therapy to improve shoulder strengthening and range of motion.  Rest, ice, compression, and elevation (RICE) therapy  Alternate OTC Ibuprofen and Tylenol as needed/if clinically indicated  Follow up with  neurosurgery as discussed above for workup of cervical spine.    Date of encounter: 04/29/2024   Yury Acuna DO    Electronically signed by Yury Acuna DO, 04/29/24, 10:47 AM EDT.    Disclaimer: Please note that areas of this note were completed with computer voice recognition software.  Quite often unanticipated grammatical, syntax, homophones, and other interpretive errors are inadvertently transcribed by the computer software. Please excuse any errors that have escaped final proofreading.

## 2024-05-07 ENCOUNTER — OFFICE VISIT (OUTPATIENT)
Dept: NEUROSURGERY | Facility: CLINIC | Age: 54
End: 2024-05-07
Payer: MEDICAID

## 2024-05-07 VITALS
BODY MASS INDEX: 23.8 KG/M2 | WEIGHT: 160.7 LBS | HEART RATE: 86 BPM | HEIGHT: 69 IN | SYSTOLIC BLOOD PRESSURE: 132 MMHG | DIASTOLIC BLOOD PRESSURE: 77 MMHG

## 2024-05-07 DIAGNOSIS — M50.30 DDD (DEGENERATIVE DISC DISEASE), CERVICAL: Primary | ICD-10-CM

## 2024-05-07 DIAGNOSIS — M47.812 CERVICAL SPONDYLOSIS: ICD-10-CM

## 2024-05-07 DIAGNOSIS — G89.29 CHRONIC LEFT SHOULDER PAIN: ICD-10-CM

## 2024-05-07 DIAGNOSIS — M25.512 CHRONIC LEFT SHOULDER PAIN: ICD-10-CM

## 2024-05-07 PROCEDURE — 99204 OFFICE O/P NEW MOD 45 MIN: CPT | Performed by: NURSE PRACTITIONER

## 2024-05-07 PROCEDURE — 1160F RVW MEDS BY RX/DR IN RCRD: CPT | Performed by: NURSE PRACTITIONER

## 2024-05-07 PROCEDURE — 1159F MED LIST DOCD IN RCRD: CPT | Performed by: NURSE PRACTITIONER

## 2024-05-08 ENCOUNTER — PATIENT ROUNDING (BHMG ONLY) (OUTPATIENT)
Dept: NEUROSURGERY | Facility: CLINIC | Age: 54
End: 2024-05-08
Payer: MEDICAID

## 2024-05-14 ENCOUNTER — TELEPHONE (OUTPATIENT)
Dept: FAMILY MEDICINE CLINIC | Facility: CLINIC | Age: 54
End: 2024-05-14
Payer: MEDICAID

## 2024-05-14 NOTE — TELEPHONE ENCOUNTER
Patient has chronic low back pain with right-sided sciatica and chronic left shoulder pain/weakness for which we filled out short-term disability paperwork    She has tried tizanidine, meloxicam, Biofreeze, physical therapy and Cymbalta up to 60 mg without much improvement.  Suggested she try TENS unit or lidocaine gel over-the-counter, ordered MRI of the left shoulder and gave her referral to sports med for further evaluation.  Sports med met her on 4/29/2024.  After reading the MRI they saw mild changes consistent with adhesive capsulitis but they had a suspicion for cervical spine pathology and referred her to neurosurgery for workup  Patient met with neurosurgery who ordered an MRI of the cervical spine for better evaluation.    Received a fax from [employer]Pt's workplace asking to let them know if patient would be able to go back to work with restrictions in place.  The employer felt they may be able to work within her recommendations to get her back to work sooner.  Because she is seeing 2 specialists, I am unsure what restrictions they would prefer patient to have.  Would we be able to reach out to them to let them know it might be best if one of the specialists fills out her restrictions (if they have any at this point as they have not finished working her up).

## 2024-05-17 ENCOUNTER — OFFICE VISIT (OUTPATIENT)
Dept: FAMILY MEDICINE CLINIC | Facility: CLINIC | Age: 54
End: 2024-05-17
Payer: MEDICAID

## 2024-05-17 ENCOUNTER — TELEPHONE (OUTPATIENT)
Dept: FAMILY MEDICINE CLINIC | Facility: CLINIC | Age: 54
End: 2024-05-17

## 2024-05-17 ENCOUNTER — HOSPITAL ENCOUNTER (OUTPATIENT)
Dept: GENERAL RADIOLOGY | Facility: HOSPITAL | Age: 54
Discharge: HOME | End: 2024-05-17
Payer: MEDICAID

## 2024-05-17 VITALS
HEIGHT: 69 IN | WEIGHT: 162.4 LBS | OXYGEN SATURATION: 98 % | BODY MASS INDEX: 24.05 KG/M2 | TEMPERATURE: 97.9 F | RESPIRATION RATE: 18 BRPM

## 2024-05-17 DIAGNOSIS — Z13.29 THYROID DISORDER SCREEN: ICD-10-CM

## 2024-05-17 DIAGNOSIS — G89.29 CHRONIC MIDLINE LOW BACK PAIN WITH RIGHT-SIDED SCIATICA: Primary | ICD-10-CM

## 2024-05-17 DIAGNOSIS — M54.41 CHRONIC MIDLINE LOW BACK PAIN WITH RIGHT-SIDED SCIATICA: Primary | ICD-10-CM

## 2024-05-17 DIAGNOSIS — I95.1 ORTHOSTATIC HYPOTENSION: ICD-10-CM

## 2024-05-17 DIAGNOSIS — G89.29 CHRONIC LEFT SHOULDER PAIN: ICD-10-CM

## 2024-05-17 DIAGNOSIS — R53.83 OTHER FATIGUE: ICD-10-CM

## 2024-05-17 DIAGNOSIS — R73.09 ELEVATED GLUCOSE: ICD-10-CM

## 2024-05-17 DIAGNOSIS — R89.9 ABNORMAL LABORATORY TEST RESULT: ICD-10-CM

## 2024-05-17 DIAGNOSIS — E78.00 ELEVATED CHOLESTEROL: ICD-10-CM

## 2024-05-17 DIAGNOSIS — K59.09 OTHER CONSTIPATION: ICD-10-CM

## 2024-05-17 DIAGNOSIS — M25.512 CHRONIC LEFT SHOULDER PAIN: ICD-10-CM

## 2024-05-17 DIAGNOSIS — Z11.59 ENCOUNTER FOR HEPATITIS C SCREENING TEST FOR LOW RISK PATIENT: ICD-10-CM

## 2024-05-17 DIAGNOSIS — R42 DIZZINESS: ICD-10-CM

## 2024-05-17 DIAGNOSIS — E55.9 VITAMIN D DEFICIENCY: ICD-10-CM

## 2024-05-17 DIAGNOSIS — M50.30 DDD (DEGENERATIVE DISC DISEASE), CERVICAL: ICD-10-CM

## 2024-05-17 PROCEDURE — 1125F AMNT PAIN NOTED PAIN PRSNT: CPT | Performed by: STUDENT IN AN ORGANIZED HEALTH CARE EDUCATION/TRAINING PROGRAM

## 2024-05-17 PROCEDURE — 99214 OFFICE O/P EST MOD 30 MIN: CPT | Performed by: STUDENT IN AN ORGANIZED HEALTH CARE EDUCATION/TRAINING PROGRAM

## 2024-05-17 PROCEDURE — 72050 X-RAY EXAM NECK SPINE 4/5VWS: CPT

## 2024-05-17 RX ORDER — DULOXETIN HYDROCHLORIDE 60 MG/1
120 CAPSULE, DELAYED RELEASE ORAL DAILY
Qty: 60 CAPSULE | Refills: 1 | Status: SHIPPED | OUTPATIENT
Start: 2024-05-17 | End: 2024-05-17

## 2024-05-17 RX ORDER — BISACODYL 10 MG
10 SUPPOSITORY, RECTAL RECTAL DAILY
Qty: 2 EACH | Refills: 0 | Status: SHIPPED | OUTPATIENT
Start: 2024-05-17

## 2024-05-17 RX ORDER — DULOXETIN HYDROCHLORIDE 60 MG/1
120 CAPSULE, DELAYED RELEASE ORAL DAILY
Qty: 60 CAPSULE | Refills: 1 | Status: SHIPPED | OUTPATIENT
Start: 2024-05-17

## 2024-05-17 NOTE — PROGRESS NOTES
Subjective   Kayla Huber is a 53 y.o. female.   Chief Complaint   Patient presents with    Short term disablity       History of Present Illness     Chronic midline low back pain with right sided sciatica/  Chronic left shoulder pain/short-term disability  -Follow up from 04/05/2024: Increase Cymbalta to 90 mg on 4/5/2024.  Tried meloxicam, physical therapy, tizanidine, cyclobenzaprine, Biofreeze, lidocaine patches but these were not helpful.  Ordered MRI and sent to [facility] for evaluation  - Sports med met her on 4/29/2024.  After reading the MRI they saw mild changes consistent with adhesive capsulitis but they had a suspicion for cervical spine pathology and referred her to neurosurgery for workup.  Patient getting x-ray of the neck today and MRI of the neck at the end of the month  -Discussed with patient that since she is in the middle of a workup and seeing specialist, I am not sure I can make recommendations in regards to restrictions that allow her to return to work sooner at least at this point in time.  If there are restrictions, the specialist may have a better idea of recovery time depending on what treatment options they may recommend once evaluation is completed    -Per above, patient currently on Cymbalta 90 mg and she feels she would benefit from an increase    Bloating/nausea  - follow up from 4/16/24: gave fiber goals, fluid goals, recommended probiotics daily and miralax.   - taking miralax bid and fleet suppositories (glycerin). Still taking pepcid that was sent in  -Patient has not been tacking her fiber intake or her fluid intake  -Patient states she feels very dehydrated  -Patient states that she gets watery diarrhea and constipation  -Can go a few days without a bowel movement and sometimes will have multiple watery bowel movements in a day  -States that she is getting a lot of abdominal pain, nausea with food and drinking water and its harder to breathe    Dizziness/lightheadedness  and loss of balance  -States that she feels off balance, lightheaded and will get sweating episodes.  Happens soon after she stands up  -Tries not to get up too fast but can get episode while walking  -States she fell 3 times in the last 2 weeks    Fatigue  -Patient states she is exhausted    The following portions of the patient's history were reviewed and updated as appropriate: allergies, current medications, past family history, past medical history, past social history, past surgical history, and problem list.    Patient Active Problem List   Diagnosis    Intractable chronic migraine without aura and with status migrainosus    Insomnia    Anxiety and depression    Cervical spondylosis    Chronic left shoulder pain       Current Outpatient Medications on File Prior to Visit   Medication Sig Dispense Refill    albuterol sulfate  (90 Base) MCG/ACT inhaler Inhale 2 puffs.      cetirizine (zyrTEC) 10 MG tablet Take 1 tablet by mouth Daily. 90 tablet 3    famotidine (PEPCID) 10 MG tablet Take 1 tablet by mouth 2 (Two) Times a Day. 60 tablet 1    fluticasone (FLONASE) 50 MCG/ACT nasal spray 2 sprays into the nostril(s) as directed by provider Daily. 18.2 mL 2    Fluticasone Propionate, Inhal, (FLOVENT DISKUS) 50 MCG/ACT diskus inhaler Inhale 1 puff 2 (Two) Times a Day. 60 each 3    rizatriptan (MAXALT) 10 MG tablet       rOPINIRole (REQUIP) 2 MG tablet       topiramate (TOPAMAX) 200 MG tablet       varenicline (Chantix Continuing Month Blane) 1 MG tablet Take 1 tablet by mouth 2 (Two) Times a Day. 60 tablet 3    [DISCONTINUED] DULoxetine (CYMBALTA) 30 MG capsule Take 1 capsule by mouth Daily. Take daily with 60mg capsule for total of 90mg daily 30 capsule 1    [DISCONTINUED] DULoxetine (CYMBALTA) 60 MG capsule Take 1 capsule by mouth Daily. Take daily with 30mg capsule for total of 90mg daily 30 capsule 1    [DISCONTINUED] traZODone (DESYREL) 50 MG tablet Take 1 tablet by mouth At Night As Needed for Sleep. 30  "tablet 1     No current facility-administered medications on file prior to visit.     Current outpatient and discharge medications have been reconciled for the patient.  Reviewed by: Chantal Benitez, DO      Allergies   Allergen Reactions    Penicillins Hives     CAN TAKE KEFLEX AND AMOXICILLIN         Objective   Visit Vitals  Temp 97.9 °F (36.6 °C) (Skin)   Resp 18   Ht 175.3 cm (69\")   Wt 73.7 kg (162 lb 6.4 oz)   SpO2 98%   BMI 23.98 kg/m²       Physical Exam  Constitutional:       Comments: Pt appears fatigued   HENT:      Head: Normocephalic and atraumatic.   Eyes:      Conjunctiva/sclera: Conjunctivae normal.   Neurological:      General: No focal deficit present.      Mental Status: She is alert and oriented to person, place, and time.   Psychiatric:         Mood and Affect: Mood normal.         Behavior: Behavior normal.       Orthostatic Pressures  Lyin/78  Sittin/60  Standin/60      Diagnoses and all orders for this visit:    1. Chronic midline low back pain with right-sided sciatica (Primary)/Chronic left shoulder pain  -Increasing duloxetine from 90 mg to 120 mg:   DULoxetine (CYMBALTA) 60 MG capsule; Take 2 capsules by mouth Daily. Take daily with 30mg capsule for total of 90mg daily  Dispense: 60 capsule; Refill: 1  -Patient in the middle of a workup for her pain with neurosurgery at the moment.  Per above will get MRI until the end of the month  -Per above, unable to accurately recommend restrictions at this point until after workup is complete.      3. Dizziness/Orthostatic hypotension  -   Discussed with patient that she has orthostatic hypotension which is likely where some of this dizziness and lightheadedness are coming from  -Heavily recommended that she track and drink 2 to 3 L of fluid a day  -Also recommended including a sports drink or Pedialyte to help with her electrolytes as well  -Patient has not had annual labs done in a long while.  Will go ahead and get those done to " see if there are other reasons she may be feeling dizzy and lightheaded  -  CBC & Differential  -     Comprehensive metabolic panel  -     TSH Rfx On Abnormal To Free T4  -     Vitamin D,25-Hydroxy  -     Vitamin B12  -     Iron Profile  -     Ferritin    Abdominal pain/constipation  - -Patient feels dehydrated, having diarrhea multiple times a day, not wanting to eat or drink due to constipation  - Again added fluid and fiber goals to patient's after visit summary  - Heavily recommended that she track and drink 2 to 3 L of fluid a day, try to increase her fiber content to help out her constipation, keep of the MiraLAX and probiotics  -If trying glycerin suppositories.  Sent in 2 bisacodyl 10mg suppositories to see if she gets better benefit  -Will also recommend patient try some milk of magnesia over-the-counter to see if that can help get her to start moving    5. Abnormal laboratory test result  -     CBC & Differential  -     Comprehensive metabolic panel    6. Elevated cholesterol  -     Lipid panel    7. Thyroid disorder screen  -     TSH Rfx On Abnormal To Free T4    8. Elevated glucose  -     Hemoglobin A1c    9. Encounter for hepatitis C screening test for low risk patient  -     HCV Antibody Rfx To Qnt PCR    10. Other fatigue/Vitamin D deficiency  -     Vitamin D,25-Hydroxy  -     Vitamin B12  -     Iron Profile  -     Ferritin      12. BMI 23.0-23.9, adult               Follow Up  -5/28/2024 for annual physical exam.  Will follow-up on constipation, back and shoulder pain and dizziness    Expected course, medications, and adverse effects discussed as appropriate.  Call or return if worsening or persistent symptoms. Hand hygiene was performed before donning protective equipment and after removal when leaving the room.    This document is intended for medical expert use only. Reading of this document by patients and/or patient's family without participating medical staff guidance may result in  misinterpretation and unintended morbidity. Any interpretation of such data is the responsibility of the patient and/or family member responsible for the patient in concert with their primary or specialist providers, not to be left for sources of online searches such as [employer], [employer] or similar queries. Relying on these approaches to knowledge may result in misinterpretation, misguided goals of care and even death should patients or family members try recommendations outside of the realm of professional medical care.

## 2024-05-17 NOTE — TELEPHONE ENCOUNTER
Please let patient I tried sending in a different suppository (if she tries a suppository I do not want her to try the glycerin suppositories she is getting over-the-counter).  Ask her to try some milk of magnesia as well over-the-counter to see if that can help get things moving

## 2024-05-17 NOTE — PATIENT INSTRUCTIONS
- drink 2.7L - 3L of fluid daily  - start over the counter probiotics daily  - increase your fiber intake by 5g daily and slowly progress up  - do miralax daily for 2 weeks at least  - do about 20 minutes daily of aerobic activity  - food diary (start with cutting out dairy or gluten)

## 2024-05-21 ENCOUNTER — TELEPHONE (OUTPATIENT)
Dept: FAMILY MEDICINE CLINIC | Facility: CLINIC | Age: 54
End: 2024-05-21
Payer: MEDICAID

## 2024-05-28 ENCOUNTER — TELEPHONE (OUTPATIENT)
Dept: FAMILY MEDICINE CLINIC | Facility: CLINIC | Age: 54
End: 2024-05-28

## 2024-05-28 NOTE — TELEPHONE ENCOUNTER
"Called Aleksandr 05/28/2024, 10:40 am spoke to Rhys in reference to receiving paper work to be updated.    Advised him we had sent in office notes for 05/17/2024 visit stating \"pt is in the middle of a workup, for her pain with neurosurgery at the moment.  Per above pt will get MRI until the end of the month. (Scheduled for 05/30/2024)  Per above, unable to accurately recommend restrictions at this point until after work up is complete.\"      Also advised him pt had a follow up appointment with Dr. Benitez today, but was cancelled due to Dr. Benitez calling in sick.    Rhys states he will send information to .  "

## 2024-05-31 NOTE — TELEPHONE ENCOUNTER
Hub staff attempted to follow warm transfer process and was unsuccessful     Caller: Kayla Huber    Relationship to patient: Self    Best call back number:     Kayla Huber (Self) [phone] (Mobile)       Patient is needing: NEEDING TO SPEAK WITH THE OFFICE REGARDING THIS ISSUE    SHE DIDN'T GET A PAYCHECK THIS WEEK AND WANTED TO SEE WHAT THE STATUS OF THIS IS

## 2024-05-31 NOTE — TELEPHONE ENCOUNTER
Spoke to pt 05/31/2024, 6:09 pm advised her of phone conversation with Aleksandr on 05/28/2024.    She states her MRI that was scheduled for 05/30/2024, was rescheduled for 06/13/2024

## 2024-06-11 DIAGNOSIS — R10.13 EPIGASTRIC PAIN: ICD-10-CM

## 2024-06-11 RX ORDER — FAMOTIDINE 10 MG
TABLET ORAL 2 TIMES DAILY
Qty: 60 TABLET | Refills: 0 | Status: SHIPPED | OUTPATIENT
Start: 2024-06-11

## 2024-06-13 ENCOUNTER — HOSPITAL ENCOUNTER (OUTPATIENT)
Dept: MRI IMAGING | Facility: HOSPITAL | Age: 54
Discharge: HOME | End: 2024-06-13
Admitting: NURSE PRACTITIONER
Payer: MEDICAID

## 2024-06-13 DIAGNOSIS — M50.30 DDD (DEGENERATIVE DISC DISEASE), CERVICAL: ICD-10-CM

## 2024-06-13 PROCEDURE — 72141 MRI NECK SPINE W/O DYE: CPT

## 2024-07-06 DIAGNOSIS — F17.200 CURRENT SMOKER: ICD-10-CM
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
## 2024-07-09 ENCOUNTER — OFFICE VISIT (OUTPATIENT)
Dept: FAMILY MEDICINE CLINIC | Facility: CLINIC | Age: 54
End: 2024-07-09
Payer: MEDICAID

## 2024-07-09 VITALS
DIASTOLIC BLOOD PRESSURE: 96 MMHG | TEMPERATURE: 97.3 F | SYSTOLIC BLOOD PRESSURE: 140 MMHG | BODY MASS INDEX: 25.03 KG/M2 | HEART RATE: 109 BPM | WEIGHT: 169 LBS | HEIGHT: 69 IN | RESPIRATION RATE: 16 BRPM | OXYGEN SATURATION: 100 %

## 2024-07-09 DIAGNOSIS — K59.09 OTHER CONSTIPATION: ICD-10-CM

## 2024-07-09 DIAGNOSIS — Z12.11 COLON CANCER SCREENING: ICD-10-CM

## 2024-07-09 DIAGNOSIS — R10.13 EPIGASTRIC PAIN: ICD-10-CM

## 2024-07-09 DIAGNOSIS — G89.29 CHRONIC LEFT SHOULDER PAIN: ICD-10-CM

## 2024-07-09 DIAGNOSIS — G47.09 OTHER INSOMNIA: ICD-10-CM

## 2024-07-09 DIAGNOSIS — F17.200 CURRENT SMOKER: ICD-10-CM

## 2024-07-09 DIAGNOSIS — Z12.31 ENCOUNTER FOR SCREENING MAMMOGRAM FOR MALIGNANT NEOPLASM OF BREAST: ICD-10-CM

## 2024-07-09 DIAGNOSIS — F32.A ANXIETY AND DEPRESSION: ICD-10-CM

## 2024-07-09 DIAGNOSIS — M54.41 CHRONIC MIDLINE LOW BACK PAIN WITH RIGHT-SIDED SCIATICA: ICD-10-CM

## 2024-07-09 DIAGNOSIS — M25.512 CHRONIC LEFT SHOULDER PAIN: ICD-10-CM

## 2024-07-09 DIAGNOSIS — F41.9 ANXIETY AND DEPRESSION: ICD-10-CM

## 2024-07-09 DIAGNOSIS — Z00.00 ANNUAL PHYSICAL EXAM: Primary | ICD-10-CM

## 2024-07-09 DIAGNOSIS — G89.29 CHRONIC MIDLINE LOW BACK PAIN WITH RIGHT-SIDED SCIATICA: ICD-10-CM

## 2024-07-09 PROCEDURE — 99396 PREV VISIT EST AGE 40-64: CPT | Performed by: STUDENT IN AN ORGANIZED HEALTH CARE EDUCATION/TRAINING PROGRAM

## 2024-07-09 PROCEDURE — 1125F AMNT PAIN NOTED PAIN PRSNT: CPT | Performed by: STUDENT IN AN ORGANIZED HEALTH CARE EDUCATION/TRAINING PROGRAM

## 2024-07-09 RX ORDER — VARENICLINE TARTRATE 1 MG/1
1 TABLET, FILM COATED ORAL 2 TIMES DAILY
Qty: 60 TABLET | Refills: 3 | Status: SHIPPED | OUTPATIENT
Start: 2024-07-09

## 2024-07-09 RX ORDER — DICLOFENAC SODIUM 75 MG/1
1 TABLET, DELAYED RELEASE ORAL EVERY 12 HOURS SCHEDULED
COMMUNITY
Start: 2024-06-18

## 2024-07-09 RX ORDER — DULOXETIN HYDROCHLORIDE 60 MG/1
120 CAPSULE, DELAYED RELEASE ORAL DAILY
Qty: 60 CAPSULE | Refills: 1 | Status: SHIPPED | OUTPATIENT
Start: 2024-07-09

## 2024-07-09 RX ORDER — AMITRIPTYLINE HYDROCHLORIDE 10 MG/1
10 TABLET, FILM COATED ORAL NIGHTLY
Qty: 30 TABLET | Refills: 1 | Status: SHIPPED | OUTPATIENT
Start: 2024-07-09

## 2024-07-10 LAB
25(OH)D3+25(OH)D2 SERPL-MCNC: 28 NG/ML (ref 30–100)
ALBUMIN SERPL-MCNC: 4.3 G/DL (ref 3.8–4.9)
ALP SERPL-CCNC: 140 IU/L (ref 44–121)
ALT SERPL-CCNC: 40 IU/L (ref 0–32)
AST SERPL-CCNC: 72 IU/L (ref 0–40)
BASOPHILS # BLD AUTO: 0 X10E3/UL (ref 0–0.2)
BASOPHILS NFR BLD AUTO: 1 %
BILIRUB SERPL-MCNC: 0.7 MG/DL (ref 0–1.2)
BUN SERPL-MCNC: 9 MG/DL (ref 6–24)
BUN/CREAT SERPL: 18 (ref 9–23)
CALCIUM SERPL-MCNC: 8.8 MG/DL (ref 8.7–10.2)
CHLORIDE SERPL-SCNC: 99 MMOL/L (ref 96–106)
CHOLEST SERPL-MCNC: 232 MG/DL (ref 100–199)
CO2 SERPL-SCNC: 24 MMOL/L (ref 20–29)
CREAT SERPL-MCNC: 0.49 MG/DL (ref 0.57–1)
EGFRCR SERPLBLD CKD-EPI 2021: 113 ML/MIN/1.73
EOSINOPHIL # BLD AUTO: 0.1 X10E3/UL (ref 0–0.4)
EOSINOPHIL NFR BLD AUTO: 1 %
ERYTHROCYTE [DISTWIDTH] IN BLOOD BY AUTOMATED COUNT: 13.1 % (ref 11.7–15.4)
FERRITIN SERPL-MCNC: 271 NG/ML (ref 15–150)
GLOBULIN SER CALC-MCNC: 2.1 G/DL (ref 1.5–4.5)
GLUCOSE SERPL-MCNC: 131 MG/DL (ref 70–99)
HBA1C MFR BLD: 5.6 % (ref 4.8–5.6)
HCT VFR BLD AUTO: 40.8 % (ref 34–46.6)
HCV AB SERPL QL IA: NORMAL
HCV IGG SERPL QL IA: NON REACTIVE
HDLC SERPL-MCNC: 42 MG/DL
HGB BLD-MCNC: 14.1 G/DL (ref 11.1–15.9)
IMM GRANULOCYTES # BLD AUTO: 0 X10E3/UL (ref 0–0.1)
IMM GRANULOCYTES NFR BLD AUTO: 0 %
IRON SATN MFR SERPL: 57 % (ref 15–55)
IRON SERPL-MCNC: 204 UG/DL (ref 27–159)
LDLC SERPL CALC-MCNC: 141 MG/DL (ref 0–99)
LYMPHOCYTES # BLD AUTO: 1.5 X10E3/UL (ref 0.7–3.1)
LYMPHOCYTES NFR BLD AUTO: 19 %
MCH RBC QN AUTO: 35.7 PG (ref 26.6–33)
MCHC RBC AUTO-ENTMCNC: 34.6 G/DL (ref 31.5–35.7)
MCV RBC AUTO: 103 FL (ref 79–97)
MONOCYTES # BLD AUTO: 0.6 X10E3/UL (ref 0.1–0.9)
MONOCYTES NFR BLD AUTO: 7 %
NEUTROPHILS # BLD AUTO: 5.8 X10E3/UL (ref 1.4–7)
NEUTROPHILS NFR BLD AUTO: 72 %
PLATELET # BLD AUTO: 241 X10E3/UL (ref 150–450)
POTASSIUM SERPL-SCNC: 3.9 MMOL/L (ref 3.5–5.2)
PROT SERPL-MCNC: 6.4 G/DL (ref 6–8.5)
RBC # BLD AUTO: 3.95 X10E6/UL (ref 3.77–5.28)
SODIUM SERPL-SCNC: 140 MMOL/L (ref 134–144)
T4 FREE SERPL-MCNC: 1.06 NG/DL (ref 0.82–1.77)
TIBC SERPL-MCNC: 359 UG/DL (ref 250–450)
TRIGL SERPL-MCNC: 268 MG/DL (ref 0–149)
TSH SERPL DL<=0.005 MIU/L-ACNC: 4.95 UIU/ML (ref 0.45–4.5)
UIBC SERPL-MCNC: 155 UG/DL (ref 131–425)
VIT B12 SERPL-MCNC: 424 PG/ML (ref 232–1245)
VLDLC SERPL CALC-MCNC: 49 MG/DL (ref 5–40)
WBC # BLD AUTO: 8 X10E3/UL (ref 3.4–10.8)

## 2024-07-12 ENCOUNTER — TELEPHONE (OUTPATIENT)
Dept: FAMILY MEDICINE CLINIC | Facility: CLINIC | Age: 54
End: 2024-07-12
Payer: MEDICAID

## 2024-07-12 PROBLEM — E78.2 MIXED HYPERLIPIDEMIA: Status: ACTIVE | Noted: 2024-07-12

## 2024-07-12 PROBLEM — E55.9 VITAMIN D DEFICIENCY: Status: ACTIVE | Noted: 2024-07-12

## 2024-07-12 NOTE — TELEPHONE ENCOUNTER
----- Message from Chantal Benitez sent at 7/12/2024  7:37 AM EDT -----  Patient's hepatitis C screen came back negative.  Her diabetes screen, vitamin B12 came back normal.  Her red and white cells overall came back normal, her red blood cells are little large and her vitamin D came back just a little bit low.  I would have her include a daily multivitamin and that might take care of both of these.    Patient's electrolytes and kidney function came back normal.  Her liver enzymes have come up and her alkaline phosphatase is a little elevated.        Patient's cholesterol did come back high.  Her ASCVD risk is 6.8% (this is a risk of heart attack and stroke in the next 10 years), is not high enough that she needs to be on a statin right now but she is getting close.  Would heavily encouraged her to try to watch the cholesterol in her diet (right now exercise is difficult for her due to her body pains)    Her thyroid screen came back a little elevated on the TSH (signal from brain to the thyroid gland to make more thyroid hormone) but her thyroid hormone itself came back in normal range.    Her ferritin (an iron storage lab) came back a little high but she is also got a lot of pain going on so there is probably inflammation is making this go high.  Her iron levels were also coming back a little high.    Am seeing patient on 6/8/2024, would like to recheck her iron/ferritin, thyroid screen and her liver enzymes.  Might do a little bit more workup in the liver should recheck come back showing that things are persistently a little elevated

## 2024-07-30 ENCOUNTER — OFFICE VISIT (OUTPATIENT)
Dept: FAMILY MEDICINE CLINIC | Facility: CLINIC | Age: 54
End: 2024-07-30
Payer: MEDICAID

## 2024-07-30 VITALS
RESPIRATION RATE: 16 BRPM | BODY MASS INDEX: 25.06 KG/M2 | TEMPERATURE: 97.3 F | OXYGEN SATURATION: 99 % | DIASTOLIC BLOOD PRESSURE: 70 MMHG | HEIGHT: 69 IN | SYSTOLIC BLOOD PRESSURE: 130 MMHG | WEIGHT: 169.2 LBS | HEART RATE: 101 BPM

## 2024-07-30 DIAGNOSIS — R11.0 NAUSEA: ICD-10-CM

## 2024-07-30 DIAGNOSIS — F41.9 ANXIETY AND DEPRESSION: ICD-10-CM

## 2024-07-30 DIAGNOSIS — M54.50 CHRONIC BILATERAL LOW BACK PAIN WITHOUT SCIATICA: Primary | ICD-10-CM

## 2024-07-30 DIAGNOSIS — G89.29 CHRONIC BILATERAL LOW BACK PAIN WITHOUT SCIATICA: Primary | ICD-10-CM

## 2024-07-30 DIAGNOSIS — M25.512 CHRONIC LEFT SHOULDER PAIN: ICD-10-CM

## 2024-07-30 DIAGNOSIS — G47.09 OTHER INSOMNIA: ICD-10-CM

## 2024-07-30 DIAGNOSIS — F32.A ANXIETY AND DEPRESSION: ICD-10-CM

## 2024-07-30 DIAGNOSIS — R10.84 GENERALIZED ABDOMINAL PAIN: ICD-10-CM

## 2024-07-30 DIAGNOSIS — G89.29 CHRONIC LEFT SHOULDER PAIN: ICD-10-CM

## 2024-07-30 DIAGNOSIS — R19.7 DIARRHEA, UNSPECIFIED TYPE: ICD-10-CM

## 2024-07-30 PROCEDURE — 99214 OFFICE O/P EST MOD 30 MIN: CPT | Performed by: STUDENT IN AN ORGANIZED HEALTH CARE EDUCATION/TRAINING PROGRAM

## 2024-07-30 PROCEDURE — 1125F AMNT PAIN NOTED PAIN PRSNT: CPT | Performed by: STUDENT IN AN ORGANIZED HEALTH CARE EDUCATION/TRAINING PROGRAM

## 2024-07-30 RX ORDER — AMITRIPTYLINE HYDROCHLORIDE 25 MG/1
25 TABLET, FILM COATED ORAL NIGHTLY
Qty: 30 TABLET | Refills: 1 | Status: SHIPPED | OUTPATIENT
Start: 2024-07-30

## 2024-07-30 RX ORDER — DICYCLOMINE HYDROCHLORIDE 10 MG/1
10 CAPSULE ORAL
Qty: 120 CAPSULE | Refills: 1 | Status: SHIPPED | OUTPATIENT
Start: 2024-07-30

## 2024-07-30 RX ORDER — ONDANSETRON 4 MG/1
4 TABLET, ORALLY DISINTEGRATING ORAL EVERY 8 HOURS PRN
Qty: 30 TABLET | Refills: 0 | Status: SHIPPED | OUTPATIENT
Start: 2024-07-30

## 2024-07-30 NOTE — PROGRESS NOTES
Subjective   Kayla Huber is a 53 y.o. female.   Chief Complaint   Patient presents with    Short term Disability    Anxiety, depression, insomnia    Constipation    Diarrhea       History of Present Illness     Chronic midline low back pain with right sided sciatica/  Chronic left shoulder pain/short-term disability  -Follow up from 7/9/2024: Refilled duloxetine 120 mg daily.  Patient completed her MRI and I asked her to call the neurosurgeon to make a follow-up appointment for evaluation.  Unable to give work and estimate as to when she can go back to work and what her functionality is until after she completes evaluation from specialists  -Cervical MRI: Facet arthropathy throughout her cervical spine, canal and bilateral neuroforaminal narrowing at varying degrees but go up to moderate in intensity noted, multiple levels of spondylosis  - Patient states she lost the phone number for neurosurgery so has not gotten back with him about making a new appointment  - requesting to go back to PT, wants to to [facility] rather than [facility] this time  -[employer] (disability claims) sent new disability paperwork to be filled out and updated in their system we will fill out today      Anxiety and depression/Other insomnia   -Follow up from 07/09/2024:   Refilled Duloxetine 60 mg 2 capsules daily, Started Amitriptyline 10 mg 1 tablet every night  -Pt states medication is helping with the anxiety and depression, no improvement with insomnia      Constipation/epigastric pain  -Follow-up from 7/9/2024: Encouraged patient to try milk of magnesia when she was feeling constipated.  Asked her to track her fiber, fluids and encouraged her to keep a probiotics.  Discussed if she is not better by next appointment we would include Bentyl  -Pt states constipation has resolved, now has diarrhea   - hasn't been tracking fiber or fluid (drinks water and gatorade)    Diarrhea:  -Started:   2 1/2 weeks ago  -Symptoms:  Occurs daily 2  to 10 x daily, worse after eating, watery stool, low back pain, intermittent abdominal pain (right and left lower quadrant pain), nausea  -Treatment:   None        The following portions of the patient's history were reviewed and updated as appropriate: allergies, current medications, past family history, past medical history, past social history, past surgical history, and problem list.    Patient Active Problem List   Diagnosis    Intractable chronic migraine without aura and with status migrainosus    Insomnia    Anxiety and depression    Cervical spondylosis    Chronic left shoulder pain    Vitamin D deficiency    Mixed hyperlipidemia       Current Outpatient Medications on File Prior to Visit   Medication Sig Dispense Refill    albuterol sulfate  (90 Base) MCG/ACT inhaler Inhale 2 puffs.      cetirizine (zyrTEC) 10 MG tablet Take 1 tablet by mouth Daily. 90 tablet 3    diclofenac (VOLTAREN) 75 MG EC tablet Take 1 tablet by mouth Every 12 (Twelve) Hours.      DULoxetine (CYMBALTA) 60 MG capsule Take 2 capsules by mouth Daily. 60 capsule 1    famotidine (PEPCID) 10 MG tablet Take 1 tablet by mouth twice daily 60 tablet 0    fluticasone (FLONASE) 50 MCG/ACT nasal spray 2 sprays into the nostril(s) as directed by provider Daily. 18.2 mL 2    Fluticasone Propionate, Inhal, (FLOVENT DISKUS) 50 MCG/ACT diskus inhaler Inhale 1 puff 2 (Two) Times a Day. 60 each 3    rizatriptan (MAXALT) 10 MG tablet       rOPINIRole (REQUIP) 2 MG tablet       topiramate (TOPAMAX) 200 MG tablet       varenicline (CHANTIX) 1 MG tablet Take 1 tablet by mouth twice daily 60 tablet 3    [DISCONTINUED] amitriptyline (ELAVIL) 10 MG tablet Take 1 tablet by mouth Every Night. 30 tablet 1    bisacodyl (DULCOLAX) 10 MG suppository Insert 1 suppository into the rectum Daily. (Patient not taking: Reported on 7/30/2024) 2 each 0     No current facility-administered medications on file prior to visit.     Current outpatient and discharge  "medications have been reconciled for the patient.  Reviewed by: Chantal Benitez, DO      Allergies   Allergen Reactions    Penicillins Hives     CAN TAKE KEFLEX AND AMOXICILLIN         Objective   Visit Vitals  /70 (BP Location: Left arm, Patient Position: Sitting, Cuff Size: Adult)   Pulse 101   Temp 97.3 °F (36.3 °C) (Skin)   Resp 16   Ht 175.3 cm (69\")   Wt 76.7 kg (169 lb 3.2 oz)   SpO2 99%   BMI 24.99 kg/m²       Physical Exam  HENT:      Head: Normocephalic and atraumatic.   Eyes:      Conjunctiva/sclera: Conjunctivae normal.   Neurological:      General: No focal deficit present.      Mental Status: She is alert and oriented to person, place, and time.   Psychiatric:         Mood and Affect: Mood normal.         Behavior: Behavior normal.           Diagnoses and all orders for this visit:    1. Chronic bilateral low back pain without sciatica (Primary)/Chronic left shoulder pain  -Patient was handed the phone number to her neurosurgeons office to call and get herself an appointment as soon as possible to review her MRI and discuss treatments  -    Ambulatory Referral to Physical Therapy: Therapy order printed, signed and handed to patient to take to facility of choice  -Filled out disability paperwork together and made the end date of her limitations the same as prior paperwork,10/15/24      3. Anxiety and depression  -Doing well on duloxetine 120 mg and amitriptyline 10 mg daily.  Only increase the amitriptyline due to insomnia per below    4. Other insomnia  -Increasing amitriptyline from 10 mg to   amitriptyline (ELAVIL) 25 MG tablet; Take 1 tablet by mouth Every Night.  Dispense: 30 tablet; Refill: 1    5. Nausea  -     ondansetron ODT (ZOFRAN-ODT) 4 MG disintegrating tablet; Place 1 tablet on the tongue Every 8 (Eight) Hours As Needed for Nausea.  Dispense: 30 tablet; Refill: 0    6. Generalized abdominal pain/ Diarrhea, unspecified type  -Asked patient to start tracking her, fiber fluids and start " taking probiotics.  Added all these goals to her after visit summary  -Started dicyclomine (BENTYL) 10 MG capsule; Take 1 capsule by mouth 4 (Four) Times a Day Before Meals & at Bedtime.  Dispense: 120 capsule; Refill: 1      8. BMI 24.0-24.9, adult           Follow Up  - 1 month insominia and diarrhea/abdominal pain    Expected course, medications, and adverse effects discussed as appropriate.  Call or return if worsening or persistent symptoms. Hand hygiene was performed before donning protective equipment and after removal when leaving the room.    This document is intended for medical expert use only. Reading of this document by patients and/or patient's family without participating medical staff guidance may result in misinterpretation and unintended morbidity. Any interpretation of such data is the responsibility of the patient and/or family member responsible for the patient in concert with their primary or specialist providers, not to be left for sources of online searches such as [employer], [employer] or similar queries. Relying on these approaches to knowledge may result in misinterpretation, misguided goals of care and even death should patients or family members try recommendations outside of the realm of professional medical care.

## 2024-07-30 NOTE — PATIENT INSTRUCTIONS
- drink 2.7L - 3L of fluid daily  - start over the counter probiotics daily  - increase your fiber intake by 5g daily and slowly progress up

## 2024-07-31 NOTE — PROGRESS NOTES
"Subjective   History of Present Illness: Kayla Huber is a 53 y.o. female is here today for follow-up.  Patient was initially seen and evaluated by myself on 5/7/2024 for neck and left arm pain and paresthesias.  Patient continues with her waxing waning of her symptoms.  She has no acute neurologic symptoms reported.  She continues to complain of mainly left-sided arm pain and paresthesias down into her fingers.  She describes subjective weakness.      History of Present Illness    The following portions of the patient's history were reviewed and updated as appropriate: allergies, current medications, past family history, past medical history, past social history, past surgical history, and problem list.    Review of Systems   Constitutional:  Positive for activity change.   HENT: Negative.     Eyes: Negative.    Respiratory: Negative.     Cardiovascular: Negative.    Gastrointestinal: Negative.    Endocrine: Negative.    Genitourinary: Negative.    Musculoskeletal:  Positive for arthralgias, myalgias, neck pain (left shoulder) and neck stiffness.   Skin: Negative.    Allergic/Immunologic: Negative.    Neurological:  Positive for weakness (left arm) and numbness (tingling/left arm).   Hematological: Negative.    Psychiatric/Behavioral:  Positive for sleep disturbance.    All other systems reviewed and are negative.      Objective     ./88 (BP Location: Right arm, Patient Position: Sitting)   Pulse (!) 125   Ht 175.3 cm (69\")   Wt 76.6 kg (168 lb 14.4 oz)   SpO2 96%   BMI 24.94 kg/m²    Body mass index is 24.94 kg/m².    Vitals:    08/01/24 1450   PainSc:   6          Physical Exam  Neurologic Exam      Assessment & Plan   Independent Review of Radiographic Studies:      I personally reviewed the images from the following studies.    MRI cervical spine 6/13/2024    Loss of lordosis without any evidence of significant subluxation.  Multiple levels of spondylitic changes mainly noted at C5-C6 and " C6-C7.  Mild to moderate canal narrowing with no cord compression.  No cord signal change noted.  There are scattered areas of foraminal narrowing throughout including on the left at C6-C7 with moderate canal and foraminal narrowing.  No cord signal change noted.    Cervical x-rays with flexion-extension 5/17/2024    Mild loss of lordosis with no significant subluxation or dynamic instability noted on flexion-extension maneuvers.    Medical Decision Making:      Kayla Huberis a 53 y.o. female following back up on her neck and arm pain and review of imaging.  Patient's imaging was reviewed demonstrating that most of her degenerative changes are along C5-C6 and C6-C7 with no cord compression.  Patient has fairly significant foraminal narrowing on the left at C5-C6 and C6-C7 and will be sent to pain management for targeted injection therapy as she is not ready to commit to any further workup for any surgical intervention at this time.  Patient welcome to follow-up with Dr. Sevilla should she exhaust conservative measures and or symptoms worsen and she desires surgical discussion.  I encouraged to call the office in the interim with any questions or concerns.        Diagnoses and all orders for this visit:    1. DDD (degenerative disc disease), cervical (Primary)  -     Ambulatory Referral to Pain Management      Return if symptoms worsen or fail to improve.    This patient was examined wearing appropriate personal protective equipment.     Kayla Huber  reports that she has been smoking cigarettes. She started smoking about 33 years ago. She has a 22.6 pack-year smoking history. She has been exposed to tobacco smoke. She has never used smokeless tobacco. I have educated her on the risk of diseases from using tobacco products such as cancer, COPD, and heart disease.     I advised her to quit and she is not willing to quit.    I spent 3  minutes counseling the patient.         Body mass index is 24.94  kg/m².    BMI is within normal parameters. No other follow-up for BMI required.    Patient's blood pressure was reviewed.  Recommendations for  a low-salt diet and exercise to maintain/improve BP in addition to taking any presribed medications.             GER Guzman  08/01/24  15:30 EDT

## 2024-08-01 ENCOUNTER — OFFICE VISIT (OUTPATIENT)
Dept: NEUROSURGERY | Facility: CLINIC | Age: 54
End: 2024-08-01
Payer: MEDICAID

## 2024-08-01 VITALS
SYSTOLIC BLOOD PRESSURE: 153 MMHG | OXYGEN SATURATION: 96 % | WEIGHT: 168.9 LBS | DIASTOLIC BLOOD PRESSURE: 88 MMHG | BODY MASS INDEX: 25.02 KG/M2 | HEIGHT: 69 IN | HEART RATE: 125 BPM

## 2024-08-01 DIAGNOSIS — M50.30 DDD (DEGENERATIVE DISC DISEASE), CERVICAL: Primary | ICD-10-CM

## 2024-08-01 PROCEDURE — 1160F RVW MEDS BY RX/DR IN RCRD: CPT | Performed by: NURSE PRACTITIONER

## 2024-08-01 PROCEDURE — 1159F MED LIST DOCD IN RCRD: CPT | Performed by: NURSE PRACTITIONER

## 2024-08-01 PROCEDURE — 99213 OFFICE O/P EST LOW 20 MIN: CPT | Performed by: NURSE PRACTITIONER

## 2024-08-03 DIAGNOSIS — R06.02 SHORTNESS OF BREATH: ICD-10-CM

## 2024-08-03 DIAGNOSIS — Z91.09 ENVIRONMENTAL ALLERGIES: ICD-10-CM

## 2024-08-05 RX ORDER — FLUTICASONE PROPIONATE 50 UG/1
1 POWDER, METERED RESPIRATORY (INHALATION) 2 TIMES DAILY
Qty: 60 EACH | Refills: 1 | Status: SHIPPED | OUTPATIENT
Start: 2024-08-05

## 2024-08-05 NOTE — TELEPHONE ENCOUNTER
[facility] is the only pt accepting medicaide in our area. I have faxed the referral there.left message for patient

## 2024-08-05 NOTE — TELEPHONE ENCOUNTER
Refilling inhaler.    Had put in a referral for physical therapy for her but I think she forgot to take it with her at checkout last time.  Can we ask her where she wants to go (make sure that they take her insurance) and I can cosign that referral order?

## 2024-08-08 ENCOUNTER — HOSPITAL ENCOUNTER (OUTPATIENT)
Dept: MAMMOGRAPHY | Facility: HOSPITAL | Age: 54
Discharge: HOME | End: 2024-08-08
Admitting: STUDENT IN AN ORGANIZED HEALTH CARE EDUCATION/TRAINING PROGRAM
Payer: MEDICAID

## 2024-08-08 PROCEDURE — 77067 SCR MAMMO BI INCL CAD: CPT

## 2024-08-08 PROCEDURE — 77063 BREAST TOMOSYNTHESIS BI: CPT

## 2024-08-13 ENCOUNTER — TELEPHONE (OUTPATIENT)
Dept: FAMILY MEDICINE CLINIC | Facility: CLINIC | Age: 54
End: 2024-08-13
Payer: MEDICAID

## 2024-08-13 NOTE — TELEPHONE ENCOUNTER
Caller: Kayla Huber    Relationship: Self    Best call back number: [phone]     What was the call regarding: REQUESTING [facility] SEND OFFICE NOTES FROM 07.09-NOW TO [facility].

## 2024-08-30 ENCOUNTER — OFFICE VISIT (OUTPATIENT)
Dept: FAMILY MEDICINE CLINIC | Facility: CLINIC | Age: 54
End: 2024-08-30
Payer: MEDICAID

## 2024-08-30 VITALS
BODY MASS INDEX: 25.62 KG/M2 | HEART RATE: 111 BPM | RESPIRATION RATE: 16 BRPM | TEMPERATURE: 98.9 F | HEIGHT: 69 IN | WEIGHT: 173 LBS | OXYGEN SATURATION: 98 % | SYSTOLIC BLOOD PRESSURE: 132 MMHG | DIASTOLIC BLOOD PRESSURE: 70 MMHG

## 2024-08-30 DIAGNOSIS — M25.512 CHRONIC LEFT SHOULDER PAIN: ICD-10-CM

## 2024-08-30 DIAGNOSIS — K59.09 OTHER CONSTIPATION: ICD-10-CM

## 2024-08-30 DIAGNOSIS — G89.29 CHRONIC BILATERAL LOW BACK PAIN WITHOUT SCIATICA: ICD-10-CM

## 2024-08-30 DIAGNOSIS — R19.7 DIARRHEA, UNSPECIFIED TYPE: ICD-10-CM

## 2024-08-30 DIAGNOSIS — Z87.891 PERSONAL HISTORY OF NICOTINE DEPENDENCE: ICD-10-CM

## 2024-08-30 DIAGNOSIS — Z23 NEED FOR PNEUMOCOCCAL 20-VALENT CONJUGATE VACCINATION: ICD-10-CM

## 2024-08-30 DIAGNOSIS — M54.50 CHRONIC BILATERAL LOW BACK PAIN WITHOUT SCIATICA: ICD-10-CM

## 2024-08-30 DIAGNOSIS — Z23 NEED FOR TDAP VACCINATION: ICD-10-CM

## 2024-08-30 DIAGNOSIS — G89.29 CHRONIC LEFT SHOULDER PAIN: ICD-10-CM

## 2024-08-30 DIAGNOSIS — G47.09 OTHER INSOMNIA: Primary | ICD-10-CM

## 2024-08-30 DIAGNOSIS — R10.84 GENERALIZED ABDOMINAL PAIN: ICD-10-CM

## 2024-08-30 PROCEDURE — 1125F AMNT PAIN NOTED PAIN PRSNT: CPT | Performed by: STUDENT IN AN ORGANIZED HEALTH CARE EDUCATION/TRAINING PROGRAM

## 2024-08-30 PROCEDURE — 90715 TDAP VACCINE 7 YRS/> IM: CPT | Performed by: STUDENT IN AN ORGANIZED HEALTH CARE EDUCATION/TRAINING PROGRAM

## 2024-08-30 PROCEDURE — 99214 OFFICE O/P EST MOD 30 MIN: CPT | Performed by: STUDENT IN AN ORGANIZED HEALTH CARE EDUCATION/TRAINING PROGRAM

## 2024-08-30 PROCEDURE — 90471 IMMUNIZATION ADMIN: CPT | Performed by: STUDENT IN AN ORGANIZED HEALTH CARE EDUCATION/TRAINING PROGRAM

## 2024-08-30 PROCEDURE — 90472 IMMUNIZATION ADMIN EACH ADD: CPT | Performed by: STUDENT IN AN ORGANIZED HEALTH CARE EDUCATION/TRAINING PROGRAM

## 2024-08-30 PROCEDURE — 90677 PCV20 VACCINE IM: CPT | Performed by: STUDENT IN AN ORGANIZED HEALTH CARE EDUCATION/TRAINING PROGRAM

## 2024-08-30 RX ORDER — AMITRIPTYLINE HYDROCHLORIDE 50 MG/1
50 TABLET ORAL NIGHTLY
Qty: 30 TABLET | Refills: 1 | Status: SHIPPED | OUTPATIENT
Start: 2024-08-30

## 2024-08-30 NOTE — PROGRESS NOTES
"Subjective   Kayla Huber is a 53 y.o. female.   Chief Complaint   Patient presents with    Insomnia    Abdominal Pain     Diarrhea         History of Present Illness     Insomnia:  -Follow up from 07/30/2024: Increased Amitriptyline from 10 mg to Amitriptyline 25 mg  -Pt states slight improvement with increase of medication      Generalized abdominal pain/diarrhea  -Follow up from 07/30/2024:  Started Dicyclomine 10 mg 4 x day.  Asked patient to start tracking her, fiber fluids and start taking probiotics. Placed referral to GI on 7/9/24  -No improvement: Has not started probiotics, states she has been tracking her fiber and fluids but does not remember how much she is getting them either  -Colonoscopy with GI scheduled for 9/10/2024  -Patient states she initially had a lot of constipation but then \"had straight water\" after      Chronic low back pain/chronic left shoulder pain  - follow up from 7/30/24: Patient completed cervical MRI, but states she lost the phone number for neurosurgery and has not gotten back with him about making a new appointment. gave her physical therapy numbe, handed her the phone number to the neurosurgeons  office to get herself an appointment as soon as possible to review her MRI and discuss treatments.  Filled out disability paperwork on this date  -Patient saw neurosurgery on 8/1/2024: They noted she was not interested in any further workup or any surgical intervention at that time so they [facility] for epidural injections  -She states she has an appoint with pain management on 9/29/2024 at 9:30 AM  -She states she told work about her injections and will call to get her self set up for physical rehab today (wants to go to [facility] [place] and needs reprint of order)        Preventative  -Pt contacted insurance for coverage of Tdap, Prevnar 20, Shingrix vaccines, she states all vaccines are covered by " insurance  -Administered Prevnar 20 and Tdap vaccines today, will get Shingrix vaccine at a later date  - will need to reschedule pap with OBGYN      The following portions of the patient's history were reviewed and updated as appropriate: allergies, current medications, past family history, past medical history, past social history, past surgical history, and problem list.    Patient Active Problem List   Diagnosis    Intractable chronic migraine without aura and with status migrainosus    Insomnia    Anxiety and depression    DDD (degenerative disc disease), cervical    Chronic left shoulder pain    Vitamin D deficiency    Mixed hyperlipidemia       Current Outpatient Medications on File Prior to Visit   Medication Sig Dispense Refill    albuterol sulfate  (90 Base) MCG/ACT inhaler Inhale 2 puffs.      bisacodyl (DULCOLAX) 10 MG suppository Insert 1 suppository into the rectum Daily. 2 each 0    cetirizine (zyrTEC) 10 MG tablet Take 1 tablet by mouth Daily. 90 tablet 3    diclofenac (VOLTAREN) 75 MG EC tablet Take 1 tablet by mouth Every 12 (Twelve) Hours.      dicyclomine (BENTYL) 10 MG capsule Take 1 capsule by mouth 4 (Four) Times a Day Before Meals & at Bedtime. 120 capsule 1    DULoxetine (CYMBALTA) 60 MG capsule Take 2 capsules by mouth Daily. 60 capsule 1    famotidine (PEPCID) 10 MG tablet Take 1 tablet by mouth twice daily 60 tablet 0    fluticasone (FLONASE) 50 MCG/ACT nasal spray 2 sprays into the nostril(s) as directed by provider Daily. 18.2 mL 2    Fluticasone Propionate, Inhal, (Fluticasone Propionate Diskus) 50 MCG/ACT aerosol powder  INHALE 1 PUFF BY MOUTH TWICE DAILY 60 each 1    ondansetron ODT (ZOFRAN-ODT) 4 MG disintegrating tablet Place 1 tablet on the tongue Every 8 (Eight) Hours As Needed for Nausea. 30 tablet 0    rizatriptan (MAXALT) 10 MG tablet       rOPINIRole (REQUIP) 2 MG tablet       topiramate (TOPAMAX) 200 MG tablet       varenicline (CHANTIX) 1 MG tablet Take 1 tablet by  "mouth twice daily 60 tablet 3    [DISCONTINUED] amitriptyline (ELAVIL) 25 MG tablet Take 1 tablet by mouth Every Night. 30 tablet 1     No current facility-administered medications on file prior to visit.     Current outpatient and discharge medications have been reconciled for the patient.  Reviewed by: Chantal Benitez, DO      Allergies   Allergen Reactions    Penicillins Hives     CAN TAKE KEFLEX AND AMOXICILLIN         Objective   Visit Vitals  /70 (BP Location: Left arm, Patient Position: Sitting, Cuff Size: Large Adult)   Pulse 111   Temp 98.9 °F (37.2 °C) (Skin)   Resp 16   Ht 175.3 cm (69\")   Wt 78.5 kg (173 lb)   SpO2 98%   BMI 25.55 kg/m²       Physical Exam  HENT:      Head: Normocephalic and atraumatic.   Eyes:      Conjunctiva/sclera: Conjunctivae normal.   Neurological:      General: No focal deficit present.      Mental Status: She is alert and oriented to person, place, and time.   Psychiatric:         Mood and Affect: Mood normal.         Behavior: Behavior normal.           Diagnoses and all orders for this visit:    1. Other insomnia (Primary)  -   Increasing amitriptyline from 25 mg to amitriptyline (ELAVIL) 50 MG tablet; Take 1 tablet by mouth Every Night.  Dispense: 30 tablet; Refill: 1    2. Generalized abdominal pain/Diarrhea, unspecified type/Other constipation  - patient has appointment with GI next month for colonoscopy.  Told her to mention her abdominal pain to see if they might be interested in addressing this as well  -Encouraged her to track her water, fiber and to start probiotics  -Hesitant to increase Bentyl as Bentyl does have the capacity to increase constipation.  Patient described constipation and then had \"straight water\".  If she is very constipated it could only be water that is able to get around the hard stool.  Would like to hold off increasing dose until of get more information (ordered abdominal pelvic CT per below)  -  CT Abdomen Pelvis Without Contrast    5. " Chronic bilateral low back pain without sciatica/ Chronic left shoulder pain  -Per above, patient declined any surgical intervention or further workup through neurosurgery.  She was referred to pain management for epidural injections but invited to come back should she be interested  -Patient has her first appointment with pain management on 9/29/2024 with [facility]  -Reprinted, signed and handed patient physical therapy order for her shoulder and back    7. Need for Tdap vaccination  -     Tdap Vaccine Greater Than or Equal To 6yo IM    8. Need for pneumococcal 20-valent conjugate vaccination  -     Pneumococcal Conjugate Vaccine 20-Valent All    9. Personal history of nicotine dependence  -      CT Chest Low Dose Cancer Screening WO    10. BMI 25.0-25.9,adult               Follow Up  - 4-6 weeks for insomnia     Expected course, medications, and adverse effects discussed as appropriate.  Call or return if worsening or persistent symptoms. Hand hygiene was performed before donning protective equipment and after removal when leaving the room.    This document is intended for medical expert use only. Reading of this document by patients and/or patient's family without participating medical staff guidance may result in misinterpretation and unintended morbidity. Any interpretation of such data is the responsibility of the patient and/or family member responsible for the patient in concert with their primary or specialist providers, not to be left for sources of online searches such as [employer], [employer] or similar queries. Relying on these approaches to knowledge may result in misinterpretation, misguided goals of care and even death should patients or family members try recommendations outside of the realm of professional medical care.

## 2024-09-04 ENCOUNTER — TELEPHONE (OUTPATIENT)
Dept: NEUROSURGERY | Facility: CLINIC | Age: 54
End: 2024-09-04
Payer: MEDICAID

## 2024-09-04 DIAGNOSIS — M47.812 CERVICAL SPONDYLOSIS: Primary | ICD-10-CM

## 2024-09-04 DIAGNOSIS — M50.30 DDD (DEGENERATIVE DISC DISEASE), CERVICAL: ICD-10-CM

## 2024-09-04 DIAGNOSIS — M25.512 CHRONIC LEFT SHOULDER PAIN: ICD-10-CM

## 2024-09-04 DIAGNOSIS — G89.29 CHRONIC LEFT SHOULDER PAIN: ICD-10-CM

## 2024-09-04 NOTE — TELEPHONE ENCOUNTER
Patient called and requested a new PT order to go to  in [place]. Ok per Mare[facility] to enter referral to PT.

## 2024-09-06 DIAGNOSIS — G89.29 CHRONIC LEFT SHOULDER PAIN: ICD-10-CM

## 2024-09-06 DIAGNOSIS — M54.41 CHRONIC MIDLINE LOW BACK PAIN WITH RIGHT-SIDED SCIATICA: ICD-10-CM

## 2024-09-06 DIAGNOSIS — M25.512 CHRONIC LEFT SHOULDER PAIN: ICD-10-CM

## 2024-09-06 DIAGNOSIS — G89.29 CHRONIC MIDLINE LOW BACK PAIN WITH RIGHT-SIDED SCIATICA: ICD-10-CM

## 2024-09-06 RX ORDER — DULOXETIN HYDROCHLORIDE 60 MG/1
120 CAPSULE, DELAYED RELEASE ORAL DAILY
Qty: 60 CAPSULE | Refills: 0 | Status: SHIPPED | OUTPATIENT
Start: 2024-09-06

## 2024-09-16 ENCOUNTER — TREATMENT (OUTPATIENT)
Dept: PHYSICAL THERAPY | Facility: CLINIC | Age: 54
End: 2024-09-16
Payer: MEDICAID

## 2024-09-16 DIAGNOSIS — M54.2 PAIN, NECK: Primary | ICD-10-CM

## 2024-09-16 DIAGNOSIS — M43.6 STIFFNESS OF CERVICAL SPINE: ICD-10-CM

## 2024-09-16 DIAGNOSIS — M54.12 RADICULOPATHY, CERVICAL: ICD-10-CM

## 2024-09-16 PROCEDURE — 97140 MANUAL THERAPY 1/> REGIONS: CPT

## 2024-09-16 PROCEDURE — 97161 PT EVAL LOW COMPLEX 20 MIN: CPT

## 2024-09-19 ENCOUNTER — TREATMENT (OUTPATIENT)
Dept: PHYSICAL THERAPY | Facility: CLINIC | Age: 54
End: 2024-09-19
Payer: MEDICAID

## 2024-09-19 DIAGNOSIS — M54.2 PAIN, NECK: Primary | ICD-10-CM

## 2024-09-19 DIAGNOSIS — M43.6 STIFFNESS OF CERVICAL SPINE: ICD-10-CM

## 2024-09-19 DIAGNOSIS — M54.12 RADICULOPATHY, CERVICAL: ICD-10-CM

## 2024-09-20 DIAGNOSIS — R19.7 DIARRHEA, UNSPECIFIED TYPE: ICD-10-CM

## 2024-09-20 DIAGNOSIS — G47.09 OTHER INSOMNIA: ICD-10-CM

## 2024-09-20 DIAGNOSIS — R10.84 GENERALIZED ABDOMINAL PAIN: ICD-10-CM

## 2024-09-21 RX ORDER — DICYCLOMINE HYDROCHLORIDE 10 MG/1
10 CAPSULE ORAL
Qty: 120 CAPSULE | Refills: 0 | Status: SHIPPED | OUTPATIENT
Start: 2024-09-21

## 2024-09-25 ENCOUNTER — TELEPHONE (OUTPATIENT)
Dept: PHYSICAL THERAPY | Facility: CLINIC | Age: 54
End: 2024-09-25

## 2024-09-26 ENCOUNTER — HOSPITAL ENCOUNTER (OUTPATIENT)
Dept: CT IMAGING | Facility: HOSPITAL | Age: 54
Discharge: HOME | End: 2024-09-26
Payer: MEDICAID

## 2024-09-26 PROCEDURE — 74176 CT ABD & PELVIS W/O CONTRAST: CPT

## 2024-09-26 PROCEDURE — 71271 CT THORAX LUNG CANCER SCR C-: CPT

## 2024-09-30 ENCOUNTER — TREATMENT (OUTPATIENT)
Dept: PHYSICAL THERAPY | Facility: CLINIC | Age: 54
End: 2024-09-30
Payer: MEDICAID

## 2024-09-30 ENCOUNTER — OFFICE VISIT (OUTPATIENT)
Dept: FAMILY MEDICINE CLINIC | Facility: CLINIC | Age: 54
End: 2024-09-30
Payer: MEDICAID

## 2024-09-30 VITALS
RESPIRATION RATE: 18 BRPM | BODY MASS INDEX: 26.25 KG/M2 | WEIGHT: 177.2 LBS | TEMPERATURE: 98 F | OXYGEN SATURATION: 97 % | HEART RATE: 72 BPM | SYSTOLIC BLOOD PRESSURE: 130 MMHG | HEIGHT: 69 IN | DIASTOLIC BLOOD PRESSURE: 70 MMHG

## 2024-09-30 DIAGNOSIS — G89.29 CHRONIC LEFT SHOULDER PAIN: ICD-10-CM

## 2024-09-30 DIAGNOSIS — E66.3 OVERWEIGHT WITH BODY MASS INDEX (BMI) OF 25 TO 25.9 IN ADULT: ICD-10-CM

## 2024-09-30 DIAGNOSIS — Z91.09 ENVIRONMENTAL ALLERGIES: ICD-10-CM

## 2024-09-30 DIAGNOSIS — M54.2 PAIN, NECK: Primary | ICD-10-CM

## 2024-09-30 DIAGNOSIS — M54.12 RADICULOPATHY, CERVICAL: ICD-10-CM

## 2024-09-30 DIAGNOSIS — G89.29 CHRONIC MIDLINE LOW BACK PAIN WITH RIGHT-SIDED SCIATICA: ICD-10-CM

## 2024-09-30 DIAGNOSIS — R19.7 DIARRHEA, UNSPECIFIED TYPE: ICD-10-CM

## 2024-09-30 DIAGNOSIS — R79.0 ABNORMAL BLOOD LEVEL OF IRON: ICD-10-CM

## 2024-09-30 DIAGNOSIS — R10.84 GENERALIZED ABDOMINAL PAIN: ICD-10-CM

## 2024-09-30 DIAGNOSIS — M54.41 CHRONIC MIDLINE LOW BACK PAIN WITH RIGHT-SIDED SCIATICA: ICD-10-CM

## 2024-09-30 DIAGNOSIS — M43.6 STIFFNESS OF CERVICAL SPINE: ICD-10-CM

## 2024-09-30 DIAGNOSIS — G47.09 OTHER INSOMNIA: Primary | ICD-10-CM

## 2024-09-30 DIAGNOSIS — M25.512 CHRONIC LEFT SHOULDER PAIN: ICD-10-CM

## 2024-09-30 DIAGNOSIS — Z23 ENCOUNTER FOR ADMINISTRATION OF VACCINE: ICD-10-CM

## 2024-09-30 DIAGNOSIS — R06.02 SHORTNESS OF BREATH: ICD-10-CM

## 2024-09-30 DIAGNOSIS — R79.89 ABNORMAL TSH: ICD-10-CM

## 2024-09-30 PROCEDURE — 97140 MANUAL THERAPY 1/> REGIONS: CPT

## 2024-09-30 PROCEDURE — 99214 OFFICE O/P EST MOD 30 MIN: CPT | Performed by: STUDENT IN AN ORGANIZED HEALTH CARE EDUCATION/TRAINING PROGRAM

## 2024-09-30 PROCEDURE — 1125F AMNT PAIN NOTED PAIN PRSNT: CPT | Performed by: STUDENT IN AN ORGANIZED HEALTH CARE EDUCATION/TRAINING PROGRAM

## 2024-09-30 PROCEDURE — 97110 THERAPEUTIC EXERCISES: CPT

## 2024-09-30 PROCEDURE — 90750 HZV VACC RECOMBINANT IM: CPT | Performed by: STUDENT IN AN ORGANIZED HEALTH CARE EDUCATION/TRAINING PROGRAM

## 2024-09-30 PROCEDURE — 97014 ELECTRIC STIMULATION THERAPY: CPT

## 2024-09-30 RX ORDER — FLUTICASONE PROPIONATE 50 UG/1
1 POWDER, METERED RESPIRATORY (INHALATION) 2 TIMES DAILY
Qty: 60 EACH | Refills: 0 | Status: SHIPPED | OUTPATIENT
Start: 2024-09-30

## 2024-09-30 RX ORDER — MELOXICAM 15 MG/1
1 TABLET ORAL DAILY
COMMUNITY

## 2024-09-30 RX ORDER — AMITRIPTYLINE HYDROCHLORIDE 75 MG/1
75 TABLET ORAL NIGHTLY
Qty: 30 TABLET | Refills: 1 | Status: SHIPPED | OUTPATIENT
Start: 2024-09-30

## 2024-09-30 NOTE — PROGRESS NOTES
Subjective   Kayla Huber is a 53 y.o. female.   Chief Complaint   Patient presents with    Insomnia       History of Present Illness     Insomnia  -Follow up from 08/30/2024:  Increased Amitriptyline from 25 mg to Amitriptyline  50 MG, 1 tablet  Every Night.    -Insomnia slightly improved with the increase but feels she needs a higher dose      Generalized abdominal pain/diarrhea  -Follow-up from 8/30/2024: Had recommended dicyclomine 10 mg 4 times daily, tracking fiber, fluids and taking probiotics.  Placed GI referral on 7/9/2024.  Patient had appointment with GI on 9/10/2024, had not been tracking fiber or fluids, reminded her to do so.  Was hesitant to increase Bentyl as this can potentially increase constipation.  Ordered CT abdomen pelvis  -Patient saw GI (Dr Swan) and was told that they wanted to do a colonoscopy and endoscopy first (scheduled on 10/7/2024) before making further recommendations  -Discussed patient's chest CT and abdominal CT findings.  Abdominal CT found some fatty deposits in the wall of her colon, fatty liver, 2-3 millimeter right kidney stone and some heart disease      Chronic low back pain/chronic left shoulder pain  -Follow-up from 8/30/2024: Patient had not seen neurosurgery but decided not to do any further workup or surgical intervention on her neck, they referred her to [facility] pain and spine for epidural injections.  -She states she has had an injection on 9/24/2024  -States that [employer] approved for long-term FMLA through 11/30/2024  -Patient participating in physical therapy that was ordered by neurosurgery for the neck  -I had tried to send her a physical therapy order for her neck and low back but physical therapy told her they could not take PCP order but could only take it from neurosurgery      Abnormal iron levels  -On 7/9/2024, iron profile came back showing iron saturation was elevated and iron levels were elevated but TIBC was in normal range.  Ferritin also  came back elevated  -Rechecking levels after 2 months      Abnormal TSH  -On 7/9/2024, patient's TSH was slightly elevated but T4 came back normal  -Rechecking TSH after 2 months    The following portions of the patient's history were reviewed and updated as appropriate: allergies, current medications, past family history, past medical history, past social history, past surgical history, and problem list.    Patient Active Problem List   Diagnosis    Intractable chronic migraine without aura and with status migrainosus    Insomnia    Anxiety and depression    DDD (degenerative disc disease), cervical    Chronic left shoulder pain    Vitamin D deficiency    Mixed hyperlipidemia       Current Outpatient Medications on File Prior to Visit   Medication Sig Dispense Refill    albuterol sulfate  (90 Base) MCG/ACT inhaler Inhale 2 puffs.      bisacodyl (DULCOLAX) 10 MG suppository Insert 1 suppository into the rectum Daily. 2 each 0    cetirizine (zyrTEC) 10 MG tablet Take 1 tablet by mouth Daily. 90 tablet 3    diclofenac (VOLTAREN) 75 MG EC tablet Take 1 tablet by mouth Every 12 (Twelve) Hours.      dicyclomine (BENTYL) 10 MG capsule TAKE 1 CAPSULE BY MOUTH 4 TIMES DAILY BEFORE MEAL(S) AND AT BEDTIME 120 capsule 0    DULoxetine (CYMBALTA) 60 MG capsule Take 2 capsules by mouth once daily 60 capsule 0    famotidine (PEPCID) 10 MG tablet Take 1 tablet by mouth twice daily 60 tablet 0    fluticasone (FLONASE) 50 MCG/ACT nasal spray 2 sprays into the nostril(s) as directed by provider Daily. 18.2 mL 2    meloxicam (MOBIC) 15 MG tablet Take 1 tablet by mouth Daily.      ondansetron ODT (ZOFRAN-ODT) 4 MG disintegrating tablet Place 1 tablet on the tongue Every 8 (Eight) Hours As Needed for Nausea. 30 tablet 0    rizatriptan (MAXALT) 10 MG tablet       rOPINIRole (REQUIP) 2 MG tablet       topiramate (TOPAMAX) 200 MG tablet       varenicline (CHANTIX) 1 MG tablet Take 1 tablet by mouth twice daily 60 tablet 3     "[DISCONTINUED] amitriptyline (ELAVIL) 50 MG tablet Take 1 tablet by mouth Every Night. 30 tablet 1    [DISCONTINUED] Fluticasone Propionate, Inhal, (Fluticasone Propionate Diskus) 50 MCG/ACT aerosol powder  INHALE 1 PUFF BY MOUTH TWICE DAILY 60 each 1     No current facility-administered medications on file prior to visit.     Current outpatient and discharge medications have been reconciled for the patient.  Reviewed by: Chantal Benitez DO      Allergies   Allergen Reactions    Amoxicillin Unknown - Low Severity    Penicillins Hives     CAN TAKE KEFLEX AND AMOXICILLIN         Objective   Visit Vitals  /70 (BP Location: Left arm, Patient Position: Sitting, Cuff Size: Adult)   Pulse 72   Temp 98 °F (36.7 °C) (Skin)   Resp 18   Ht 175.3 cm (69\")   Wt 80.4 kg (177 lb 3.2 oz)   SpO2 97%   BMI 26.17 kg/m²       Physical Exam  HENT:      Head: Normocephalic and atraumatic.   Eyes:      Conjunctiva/sclera: Conjunctivae normal.   Abdominal:      General: There is distension.   Neurological:      General: No focal deficit present.      Mental Status: She is alert and oriented to person, place, and time.   Psychiatric:         Mood and Affect: Mood normal.         Behavior: Behavior normal.           Diagnoses and all orders for this visit:    1. Other insomnia (Primary)  -   Increasing amitriptyline from 50 mg to amitriptyline (ELAVIL) 75 MG tablet; Take 1 tablet by mouth Every Night.  Dispense: 30 tablet; Refill: 1    2. Diarrhea, unspecified type/ Generalized abdominal pain  -Checking in on patient's condition.  Has upcoming colonoscopy and endoscopy with GI    4. Chronic midline low back pain with right-sided sciatica/ Chronic left shoulder pain  -Per patient report, sounds like we are unable to do orders for physical therapy that will be excepted by her insurance without neurosurgery ordering them  -For patient's low back pain, asked her to request physical therapy for her low back as well    6. Abnormal blood " level of iron  -     Iron Profile  -     Ferritin    7. Abnormal TSH  -     TSH Rfx On Abnormal To Free T4    8. Encounter for administration of vaccine  -     Shingrix Vaccine    9. Overweight with body mass index (BMI) of 25 to 25.9 in adult             Follow Up  - 4-6 weeks neck and low back pain, abdominal pain and insomnia    Expected course, medications, and adverse effects discussed as appropriate.  Call or return if worsening or persistent symptoms. Hand hygiene was performed before donning protective equipment and after removal when leaving the room.    This document is intended for medical expert use only. Reading of this document by patients and/or patient's family without participating medical staff guidance may result in misinterpretation and unintended morbidity. Any interpretation of such data is the responsibility of the patient and/or family member responsible for the patient in concert with their primary or specialist providers, not to be left for sources of online searches such as [employer], [employer] or similar queries. Relying on these approaches to knowledge may result in misinterpretation, misguided goals of care and even death should patients or family members try recommendations outside of the realm of professional medical care.

## 2024-09-30 NOTE — PROGRESS NOTES
Physical Therapy Daily Treatment Note                        [address] [place] [address]   [address] [place]. [address]    Patient: Kayla Huber   : 1970  Diagnosis/ICD-10 Code:  Pain, neck [M54.2]  Referring practitioner: GER Guzman  Date of Initial Visit: Type: THERAPY  Noted: 2024  Today's Date: 2024  Patient seen for 3 sessions           Subjective   Pt reports receiving an injection in her neck last week and feeling a bit better.   Pt has been dealing with some GI issues and that's why she missed PT last week.       Objective   See Exercise, Manual, and Modality Logs for complete treatment.     Assessment/Plan     Pt performed all therapeutic exercises with good technique. Pt continues to have limitations with strength and flexibility. Pt will benefit from continued PT to address these limitations.       Timed:  Therapeutic Exercise:    15     mins  56247;     Neuromuscular Radha:   0    mins  17436;  Manual Therapy:    12     mins  44627;    Gait Trainin     mins  89933;     Ultrasound:                    0     mins  42428  Therapeutic Activity:     0     mins  60303;    Un-timed:  Electrical Stimulation:    15     mins  15620 ( );  Mechanical Traction      0     mins  93385  Dry Needling     0     mins self-pay        Timed Treatment:   27   mins   Total Treatment:     42   mins    Leobardo Reynolds, PT  Physical Therapist    Electronically signed 2024    [place] License:  [id number] - [id number]  [place] License: PT - [id number]

## 2024-10-01 LAB
FERRITIN SERPL-MCNC: 667 NG/ML (ref 15–150)
IRON SATN MFR SERPL: 55 % (ref 15–55)
IRON SERPL-MCNC: 160 UG/DL (ref 27–159)
TIBC SERPL-MCNC: 290 UG/DL (ref 250–450)
TSH SERPL DL<=0.005 MIU/L-ACNC: 3.13 UIU/ML (ref 0.45–4.5)
UIBC SERPL-MCNC: 130 UG/DL (ref 131–425)

## 2024-10-02 ENCOUNTER — TREATMENT (OUTPATIENT)
Dept: PHYSICAL THERAPY | Facility: CLINIC | Age: 54
End: 2024-10-02
Payer: MEDICAID

## 2024-10-02 DIAGNOSIS — M43.6 STIFFNESS OF CERVICAL SPINE: ICD-10-CM

## 2024-10-02 DIAGNOSIS — M54.2 PAIN, NECK: ICD-10-CM

## 2024-10-02 DIAGNOSIS — M54.12 RADICULOPATHY, CERVICAL: Primary | ICD-10-CM

## 2024-10-02 PROCEDURE — 97140 MANUAL THERAPY 1/> REGIONS: CPT

## 2024-10-02 PROCEDURE — 97014 ELECTRIC STIMULATION THERAPY: CPT

## 2024-10-02 PROCEDURE — 97110 THERAPEUTIC EXERCISES: CPT

## 2024-10-02 NOTE — PROGRESS NOTES
Physical Therapy Daily Treatment Note                        [address] [place] [address] Dr.  [address] [place]. [address]    Patient: Kayla Huber   : 1970  Diagnosis/ICD-10 Code:  Radiculopathy, cervical [M54.12]  Referring practitioner: GER Guzman  Date of Initial Visit: Type: THERAPY  Noted: 2024  Today's Date: 10/2/2024  Patient seen for 4 sessions           Subjective   Pt feeling less pain in left arm today.     Objective   See Exercise, Manual, and Modality Logs for complete treatment.     Assessment/Plan     Pt performed all therapeutic exercises with good technique. After performing chink tuck patient had some increased radiating left forearm pain.  Pt continues to have limitations with strength and flexibility. Pt will benefit from continued PT to address these limitations.       Timed:  Therapeutic Exercise:    10     mins  52294;     Neuromuscular Rahda:   0    mins  96276;  Manual Therapy:    15     mins  56865;    Gait Trainin     mins  07431;     Ultrasound:                    0     mins  05940  Therapeutic Activity:     0     mins  12354;    Un-timed:  Electrical Stimulation:    15     mins  98222 ( );  Mechanical Traction      0     mins  14043  Dry Needling     0     mins self-pay        Timed Treatment:   25   mins   Total Treatment:     40   mins    Leobardo Reynolds PT  Physical Therapist    Electronically signed 10/2/2024    [place] License:  PT - [id number]  [place] License: PT - [id number]

## 2024-10-07 PROCEDURE — 88312 SPECIAL STAINS GROUP 1: CPT | Performed by: INTERNAL MEDICINE

## 2024-10-07 PROCEDURE — 88305 TISSUE EXAM BY PATHOLOGIST: CPT | Performed by: INTERNAL MEDICINE

## 2024-10-07 PROCEDURE — 88342 IMHCHEM/IMCYTCHM 1ST ANTB: CPT | Performed by: INTERNAL MEDICINE

## 2024-10-08 ENCOUNTER — LAB REQUISITION (OUTPATIENT)
Dept: LAB | Facility: HOSPITAL | Age: 54
End: 2024-10-08
Payer: MEDICAID

## 2024-10-08 ENCOUNTER — TREATMENT (OUTPATIENT)
Dept: PHYSICAL THERAPY | Facility: CLINIC | Age: 54
End: 2024-10-08
Payer: MEDICAID

## 2024-10-08 DIAGNOSIS — R10.84 GENERALIZED ABDOMINAL PAIN: ICD-10-CM

## 2024-10-08 DIAGNOSIS — M54.12 RADICULOPATHY, CERVICAL: Primary | ICD-10-CM

## 2024-10-08 DIAGNOSIS — M43.6 STIFFNESS OF CERVICAL SPINE: ICD-10-CM

## 2024-10-08 DIAGNOSIS — Z12.11 ENCOUNTER FOR SCREENING FOR MALIGNANT NEOPLASM OF COLON: ICD-10-CM

## 2024-10-08 DIAGNOSIS — M54.2 PAIN, NECK: ICD-10-CM

## 2024-10-08 NOTE — PROGRESS NOTES
Physical Therapy Daily Treatment Note      Patient: Kayla Huber   : 1970  Diagnosis/ICD-10 Code:  Radiculopathy, cervical [M54.12]   Problems Addressed this Visit    None  Visit Diagnoses       Radiculopathy, cervical    -  Primary    Pain, neck        Stiffness of cervical spine              Diagnoses         Codes Comments    Radiculopathy, cervical    -  Primary ICD-10-CM: M54.12  ICD-9-CM: 723.4     Pain, neck     ICD-10-CM: M54.2  ICD-9-CM: 723.1     Stiffness of cervical spine     ICD-10-CM: M43.6  ICD-9-CM: 723.5            Referring practitioner: GER Guzman  Date of Initial Visit: Type: THERAPY  Noted: 2024  Today's Date: 10/8/2024    VISIT#: 5    Subjective Patient reports overall feeling some improvements since beginning PT and getting pain shot in neck last week.       Objective     See Exercise, Manual, and Modality Logs for complete treatment.      Assessment/Plan Patient continues to respond well to manual interventions with decreased symptoms reported post treatment. Patient with decreased radicular symptoms and will continue to benefit from improved UE strengthening for improved tolerance to daily functional activity.       Progress per Plan of Care and Progress strengthening /stabilization /functional activity         Timed:         Manual Therapy:    15     mins  53544;     Therapeutic Exercise:    10     mins  59466;     Neuromuscular Radha:        mins  65344;    Therapeutic Activity:          mins  78635;     Gait Training:           mins  73668;     Ultrasound:          mins  78752;    Ionto                                   mins   96048  Self Care                    _____  mins   65522  Canalith Repos                   mins  83462    Un-Timed:  Electrical Stimulation:    15     mins  39266 ( );  Dry Needling          mins self-pay   Traction          mins 76564    Timed Treatment:   25   mins   Total Treatment:      40  mins    Hilario Connors PTA  [place] License  [id number]  Physical Therapist Assistant

## 2024-10-09 LAB
LAB AP CASE REPORT: NORMAL
PATH REPORT.FINAL DX SPEC: NORMAL
PATH REPORT.GROSS SPEC: NORMAL

## 2024-10-12 DIAGNOSIS — R10.13 EPIGASTRIC PAIN: ICD-10-CM

## 2024-10-14 RX ORDER — FAMOTIDINE 10 MG/1
10 TABLET ORAL 2 TIMES DAILY
Qty: 60 TABLET | Refills: 0 | Status: SHIPPED | OUTPATIENT
Start: 2024-10-14

## 2024-10-15 ENCOUNTER — TREATMENT (OUTPATIENT)
Dept: PHYSICAL THERAPY | Facility: CLINIC | Age: 54
End: 2024-10-15
Payer: MEDICAID

## 2024-10-15 DIAGNOSIS — M54.12 RADICULOPATHY, CERVICAL: Primary | ICD-10-CM

## 2024-10-15 DIAGNOSIS — M54.2 PAIN, NECK: ICD-10-CM

## 2024-10-15 DIAGNOSIS — M43.6 STIFFNESS OF CERVICAL SPINE: ICD-10-CM

## 2024-10-15 PROCEDURE — 97110 THERAPEUTIC EXERCISES: CPT

## 2024-10-15 PROCEDURE — 97014 ELECTRIC STIMULATION THERAPY: CPT

## 2024-10-15 PROCEDURE — 97140 MANUAL THERAPY 1/> REGIONS: CPT

## 2024-10-15 NOTE — PROGRESS NOTES
Physical Therapy Daily Treatment Note                        [address] [place] [address] .  [address] [place]. [address]    Patient: Kayla Huber   : 1970  Diagnosis/ICD-10 Code:  Radiculopathy, cervical [M54.12]  Referring practitioner: GER Guzman  Date of Initial Visit: Type: THERAPY  Noted: 2024  Today's Date: 10/15/2024  Patient seen for 6 sessions           Subjective     Pt reports she had great relief from recent injection but the pain has slowly returned.   Pt considering ablations to cervical nerves.     Objective   See Exercise, Manual, and Modality Logs for complete treatment.     Assessment/Plan    Pt likely going to have injections in the future.  Feeling better after manual distractions. Will add mechanical tx next visit.    Pt performed all therapeutic exercises with good technique. Pt continues to have limitations with strength and flexibility. Pt will benefit from continued PT to address these limitations.       Timed:  Therapeutic Exercise:    20     mins  66396;     Neuromuscular Radha:   0    mins  89907;  Manual Therapy:    10     mins  06502;    Gait Trainin     mins  72845;     Ultrasound:                    0     mins  03411  Therapeutic Activity:     0     mins  88552;    Un-timed:  Electrical Stimulation:    15     mins  57003 ( );  Mechanical Traction      0     mins  25207  Dry Needling     0     mins self-pay        Timed Treatment:   30   mins   Total Treatment:     45   mins    Leobardo Reynolds PT  Physical Therapist    Electronically signed 10/15/2024    [place] License:  PT - [id number]  [place] License: PT - [id number]

## 2024-10-21 ENCOUNTER — TREATMENT (OUTPATIENT)
Dept: PHYSICAL THERAPY | Facility: CLINIC | Age: 54
End: 2024-10-21
Payer: COMMERCIAL

## 2024-10-21 DIAGNOSIS — M43.6 STIFFNESS OF CERVICAL SPINE: ICD-10-CM

## 2024-10-21 DIAGNOSIS — M54.2 PAIN, NECK: ICD-10-CM

## 2024-10-21 DIAGNOSIS — M54.12 RADICULOPATHY, CERVICAL: Primary | ICD-10-CM

## 2024-10-21 PROCEDURE — 97012 MECHANICAL TRACTION THERAPY: CPT

## 2024-10-21 PROCEDURE — 97140 MANUAL THERAPY 1/> REGIONS: CPT

## 2024-10-21 PROCEDURE — 97110 THERAPEUTIC EXERCISES: CPT

## 2024-10-21 NOTE — PROGRESS NOTES
Physical Therapy Daily Treatment Note      Patient: Kayla Huber   : 1970  Diagnosis/ICD-10 Code:  Radiculopathy, cervical [M54.12]   Problems Addressed this Visit    None  Visit Diagnoses       Radiculopathy, cervical    -  Primary    Pain, neck        Stiffness of cervical spine              Diagnoses         Codes Comments    Radiculopathy, cervical    -  Primary ICD-10-CM: M54.12  ICD-9-CM: 723.4     Pain, neck     ICD-10-CM: M54.2  ICD-9-CM: 723.1     Stiffness of cervical spine     ICD-10-CM: M43.6  ICD-9-CM: 723.5            Referring practitioner: GER Guzman  Date of Initial Visit: Type: THERAPY  Noted: 2024  Today's Date: 10/21/2024    VISIT#: 7    Subjective Patient reports feeling some improvements over the past couple weeks with improved cervical mobility and decreased aching in neck.       Objective added static cervical traction.     See Exercise, Manual, and Modality Logs for complete treatment.     Assessment/Plan Patient good initial response to added cervical traction with decreased symptoms reported. Patient demonstrated good tolerance to all performed therapeutic exercise and continues to make gradual and meaningful gains towards functional goals.       Progress per Plan of Care and Progress strengthening /stabilization /functional activity         Timed:         Manual Therapy:    15     mins  08772;     Therapeutic Exercise:    10     mins  16142;     Neuromuscular Radha:        mins  59767;    Therapeutic Activity:          mins  98941;     Gait Training:           mins  54863;     Ultrasound:          mins  85934;    Ionto                                   mins   55301  Self Care                    _____  mins   75788  Canalith Repos                   mins  27910    Un-Timed:  Electrical Stimulation:         mins  19704 ( );  Dry Needling          mins self-pay   Traction     15     mins 41516    Timed Treatment:   25   mins   Total Treatment:     40    anna Connors, [facility]  [place] License [id number]  Physical Therapist Assistant

## 2024-10-22 ENCOUNTER — TELEPHONE (OUTPATIENT)
Dept: FAMILY MEDICINE CLINIC | Facility: CLINIC | Age: 54
End: 2024-10-22
Payer: COMMERCIAL

## 2024-10-22 DIAGNOSIS — E83.19 IRON EXCESS: ICD-10-CM

## 2024-10-22 DIAGNOSIS — R79.89 ELEVATED FERRITIN LEVEL: Primary | ICD-10-CM

## 2024-10-22 NOTE — TELEPHONE ENCOUNTER
Called pt and left message asking for a call back so I can give her information on orders that have been placed.

## 2024-10-22 NOTE — TELEPHONE ENCOUNTER
Placed order for hematology oncology, placed a lab order to screen for gene for hemochromatosis and placed order for a right upper quadrant ultrasound to evaluate the liver.  Please let patient know

## 2024-10-23 DIAGNOSIS — R10.84 GENERALIZED ABDOMINAL PAIN: ICD-10-CM

## 2024-10-23 DIAGNOSIS — R19.7 DIARRHEA, UNSPECIFIED TYPE: ICD-10-CM

## 2024-10-23 DIAGNOSIS — G47.09 OTHER INSOMNIA: ICD-10-CM

## 2024-10-23 RX ORDER — AMITRIPTYLINE HYDROCHLORIDE 50 MG/1
50 TABLET ORAL NIGHTLY
Qty: 30 TABLET | Refills: 0 | OUTPATIENT
Start: 2024-10-23

## 2024-10-23 NOTE — TELEPHONE ENCOUNTER
Patient is no longer on amitriptyline 50. She is on 75mg now. Declining this refill. Routing bentyl to pcp.

## 2024-10-24 ENCOUNTER — TELEPHONE (OUTPATIENT)
Dept: ONCOLOGY | Facility: CLINIC | Age: 54
End: 2024-10-24
Payer: COMMERCIAL

## 2024-10-24 ENCOUNTER — TELEPHONE (OUTPATIENT)
Dept: PHYSICAL THERAPY | Facility: OTHER | Age: 54
End: 2024-10-24
Payer: COMMERCIAL

## 2024-10-24 NOTE — TELEPHONE ENCOUNTER
Caller: Kayla Huber    Relationship: Self    What was the call regarding: PATIENT CANCELLED APPT TODAY DUE TO NOT FEELING WELL

## 2024-10-25 DIAGNOSIS — F17.200 CURRENT SMOKER: ICD-10-CM

## 2024-10-25 RX ORDER — DICYCLOMINE HYDROCHLORIDE 10 MG/1
10 CAPSULE ORAL
Qty: 120 CAPSULE | Refills: 0 | Status: SHIPPED | OUTPATIENT
Start: 2024-10-25

## 2024-10-25 RX ORDER — VARENICLINE TARTRATE 1 MG/1
1 TABLET, FILM COATED ORAL 2 TIMES DAILY
Qty: 60 TABLET | Refills: 0 | Status: SHIPPED | OUTPATIENT
Start: 2024-10-25

## 2024-10-26 DIAGNOSIS — R06.02 SHORTNESS OF BREATH: ICD-10-CM

## 2024-10-26 DIAGNOSIS — Z91.09 ENVIRONMENTAL ALLERGIES: ICD-10-CM

## 2024-10-28 ENCOUNTER — TREATMENT (OUTPATIENT)
Dept: PHYSICAL THERAPY | Facility: CLINIC | Age: 54
End: 2024-10-28
Payer: MEDICAID

## 2024-10-28 DIAGNOSIS — M43.6 STIFFNESS OF CERVICAL SPINE: ICD-10-CM

## 2024-10-28 DIAGNOSIS — M54.2 PAIN, NECK: ICD-10-CM

## 2024-10-28 DIAGNOSIS — M54.12 RADICULOPATHY, CERVICAL: Primary | ICD-10-CM

## 2024-10-28 PROCEDURE — 97012 MECHANICAL TRACTION THERAPY: CPT

## 2024-10-28 PROCEDURE — 97140 MANUAL THERAPY 1/> REGIONS: CPT

## 2024-10-28 PROCEDURE — 97110 THERAPEUTIC EXERCISES: CPT

## 2024-10-28 RX ORDER — FLUTICASONE PROPIONATE 50 UG/1
1 POWDER, METERED RESPIRATORY (INHALATION) 2 TIMES DAILY
Qty: 60 EACH | Refills: 0 | Status: SHIPPED | OUTPATIENT
Start: 2024-10-28

## 2024-10-28 NOTE — PROGRESS NOTES
Physical Therapy Daily Treatment Note      Patient: Kayla Huber   : 1970  Diagnosis/ICD-10 Code:  Radiculopathy, cervical [M54.12]   Problems Addressed this Visit    None  Visit Diagnoses       Radiculopathy, cervical    -  Primary    Pain, neck        Stiffness of cervical spine              Diagnoses         Codes Comments    Radiculopathy, cervical    -  Primary ICD-10-CM: M54.12  ICD-9-CM: 723.4     Pain, neck     ICD-10-CM: M54.2  ICD-9-CM: 723.1     Stiffness of cervical spine     ICD-10-CM: M43.6  ICD-9-CM: 723.5            Referring practitioner: GER Guzman  Date of Initial Visit: Type: THERAPY  Noted: 2024  Today's Date: 10/28/2024    VISIT#: 8    Subjective Patient reports having some tingling in both hands earlier this morning.       Objective     See Exercise, Manual, and Modality Logs for complete treatment.     Assessment/Plan Patient with good response to manual/traction interventions with decreased symptoms reported post treatment. Patient tolerated all progressed therapeutic exercise well with no reports of increased symptoms.       Progress per Plan of Care and Progress strengthening /stabilization /functional activity         Timed:         Manual Therapy:    15     mins  58491;     Therapeutic Exercise:    10     mins  49201;     Neuromuscular Radha:        mins  03594;    Therapeutic Activity:          mins  95051;     Gait Training:           mins  95813;     Ultrasound:          mins  18771;    Ionto                                   mins   53793  Self Care                    _____  mins   16767  Canalith Repos                   mins  82752    Un-Timed:  Electrical Stimulation:         mins  61455 ( );  Dry Needling          mins self-pay   Traction     15     mins 38055    Timed Treatment:   25   mins   Total Treatment:     40   mins    Hilario Connors PTA  [place] License [id number]  Physical Therapist Assistant

## 2024-10-31 DIAGNOSIS — F17.200 CURRENT SMOKER: ICD-10-CM

## 2024-11-01 RX ORDER — VARENICLINE TARTRATE 1 MG/1
1 TABLET, FILM COATED ORAL 2 TIMES DAILY
Qty: 60 TABLET | Refills: 0 | Status: SHIPPED | OUTPATIENT
Start: 2024-11-01

## 2024-11-08 DIAGNOSIS — R10.13 EPIGASTRIC PAIN: ICD-10-CM

## 2024-11-08 RX ORDER — FAMOTIDINE 10 MG/1
10 TABLET ORAL 2 TIMES DAILY
Qty: 60 TABLET | Refills: 2 | Status: SHIPPED | OUTPATIENT
Start: 2024-11-08

## 2024-11-12 ENCOUNTER — TELEPHONE (OUTPATIENT)
Dept: ONCOLOGY | Facility: CLINIC | Age: 54
End: 2024-11-12
Payer: COMMERCIAL

## 2024-11-12 NOTE — TELEPHONE ENCOUNTER
Caller: Kayla Huber    Relationship: Self    Best call back number: [phone]    Who are you requesting to speak with (clinical staff, provider,  specific staff member): scheduling    What was the call regarding: [employer] doesn't participate with [facility]. pt will need to be scheduled with a participating provider

## 2024-11-12 NOTE — TELEPHONE ENCOUNTER
Called and left v/m letting Pt know that we do not take her INS. Unless she veronica like to be self pay we will cancel her appt on 11/20

## 2024-11-18 ENCOUNTER — TELEPHONE (OUTPATIENT)
Dept: ONCOLOGY | Facility: CLINIC | Age: 54
End: 2024-11-18
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
## 2024-11-18 NOTE — TELEPHONE ENCOUNTER
Caller: Kayla Huber    Relationship: Self    Best call back number [phone]    What is the best time to reach you: ANYTIME    Who are you requesting to speak with (clinical staff, provider,  specific staff member): BETHANY         What was the call regarding:     BETHANY HAD CALLED LAST WEEK, ABOUT INSURANCE  WANTED TO LET KNOW DOES NOT HAVE THE [facility] [employer] INSURANCE, THAT WAS FRAUDULENTLY ADDED HAS HAPPENED TWICE NOW THIS YEAR . ONLY HAS [place] MEDICAID (HIP)    UPDATED INSURANCE INFORMATION CHART . CONFIRMED APPT 11/20        Is it okay if the provider responds through MyChart: YES    ADDITIONAL NOTE: SEE SIGNED ENCOUNTER 11/12

## 2024-11-19 PROBLEM — E66.3 OVERWEIGHT WITH BODY MASS INDEX (BMI) OF 26 TO 26.9 IN ADULT: Status: ACTIVE | Noted: 2024-11-19

## 2024-11-19 PROBLEM — F17.200 TOBACCO USE DISORDER: Status: ACTIVE | Noted: 2024-11-19

## 2024-11-20 ENCOUNTER — CONSULT (OUTPATIENT)
Dept: ONCOLOGY | Facility: CLINIC | Age: 54
End: 2024-11-20
Payer: COMMERCIAL

## 2024-11-20 ENCOUNTER — APPOINTMENT (OUTPATIENT)
Dept: LAB | Facility: HOSPITAL | Age: 54
End: 2024-11-20
Payer: MEDICAID

## 2024-11-20 VITALS
OXYGEN SATURATION: 98 % | RESPIRATION RATE: 16 BRPM | WEIGHT: 185.4 LBS | HEIGHT: 69 IN | SYSTOLIC BLOOD PRESSURE: 123 MMHG | BODY MASS INDEX: 27.46 KG/M2 | DIASTOLIC BLOOD PRESSURE: 83 MMHG | TEMPERATURE: 97.6 F | HEART RATE: 101 BPM

## 2024-11-20 DIAGNOSIS — R16.1 SPLENOMEGALY: ICD-10-CM

## 2024-11-20 DIAGNOSIS — R79.89 ELEVATED FERRITIN: Primary | ICD-10-CM

## 2024-11-20 DIAGNOSIS — E55.9 VITAMIN D DEFICIENCY: ICD-10-CM

## 2024-11-20 LAB
ALBUMIN SERPL-MCNC: 4 G/DL (ref 3.5–5.2)
ALBUMIN/GLOB SERPL: 1.3 G/DL
ALP SERPL-CCNC: 279 U/L (ref 39–117)
ALT SERPL W P-5'-P-CCNC: 37 U/L (ref 1–33)
ANION GAP SERPL CALCULATED.3IONS-SCNC: 14 MMOL/L (ref 5–15)
AST SERPL-CCNC: 136 U/L (ref 1–32)
BASOPHILS # BLD AUTO: 0.06 10*3/MM3 (ref 0–0.2)
BASOPHILS NFR BLD AUTO: 0.5 % (ref 0–1.5)
BILIRUB SERPL-MCNC: 1.1 MG/DL (ref 0–1.2)
BUN SERPL-MCNC: 11 MG/DL (ref 6–20)
BUN/CREAT SERPL: 16.4 (ref 7–25)
CALCIUM SPEC-SCNC: 9.8 MG/DL (ref 8.6–10.5)
CHLORIDE SERPL-SCNC: 98 MMOL/L (ref 98–107)
CO2 SERPL-SCNC: 25 MMOL/L (ref 22–29)
CREAT SERPL-MCNC: 0.67 MG/DL (ref 0.57–1)
DEPRECATED RDW RBC AUTO: 51.5 FL (ref 37–54)
EGFRCR SERPLBLD CKD-EPI 2021: 105.3 ML/MIN/1.73
EOSINOPHIL # BLD AUTO: 0.13 10*3/MM3 (ref 0–0.4)
EOSINOPHIL NFR BLD AUTO: 1.1 % (ref 0.3–6.2)
ERYTHROCYTE [DISTWIDTH] IN BLOOD BY AUTOMATED COUNT: 13.6 % (ref 12.3–15.4)
FERRITIN SERPL-MCNC: 675 NG/ML (ref 13–150)
GLOBULIN UR ELPH-MCNC: 3.1 GM/DL
GLUCOSE SERPL-MCNC: 121 MG/DL (ref 65–99)
HCT VFR BLD AUTO: 41.5 % (ref 34–46.6)
HGB BLD-MCNC: 14.1 G/DL (ref 12–15.9)
IRON 24H UR-MRATE: 149 MCG/DL (ref 37–145)
IRON SATN MFR SERPL: 44 % (ref 20–50)
LYMPHOCYTES # BLD AUTO: 1.69 10*3/MM3 (ref 0.7–3.1)
LYMPHOCYTES NFR BLD AUTO: 14 % (ref 19.6–45.3)
MCH RBC QN AUTO: 35.3 PG (ref 26.6–33)
MCHC RBC AUTO-ENTMCNC: 34 G/DL (ref 31.5–35.7)
MCV RBC AUTO: 103.8 FL (ref 79–97)
MONOCYTES # BLD AUTO: 0.78 10*3/MM3 (ref 0.1–0.9)
MONOCYTES NFR BLD AUTO: 6.4 % (ref 5–12)
NEUTROPHILS NFR BLD AUTO: 78 % (ref 42.7–76)
NEUTROPHILS NFR BLD AUTO: 9.44 10*3/MM3 (ref 1.7–7)
PLATELET # BLD AUTO: 193 10*3/MM3 (ref 140–450)
PMV BLD AUTO: 10.5 FL (ref 6–12)
POTASSIUM SERPL-SCNC: 4.6 MMOL/L (ref 3.5–5.2)
PROT SERPL-MCNC: 7.1 G/DL (ref 6–8.5)
RBC # BLD AUTO: 4 10*6/MM3 (ref 3.77–5.28)
SODIUM SERPL-SCNC: 137 MMOL/L (ref 136–145)
TIBC SERPL-MCNC: 338 MCG/DL (ref 298–536)
TRANSFERRIN SERPL-MCNC: 227 MG/DL (ref 200–360)
WBC NRBC COR # BLD AUTO: 12.1 10*3/MM3 (ref 3.4–10.8)

## 2024-11-20 PROCEDURE — 84466 ASSAY OF TRANSFERRIN: CPT | Performed by: INTERNAL MEDICINE

## 2024-11-20 PROCEDURE — 80053 COMPREHEN METABOLIC PANEL: CPT | Performed by: INTERNAL MEDICINE

## 2024-11-20 PROCEDURE — 82728 ASSAY OF FERRITIN: CPT | Performed by: INTERNAL MEDICINE

## 2024-11-20 PROCEDURE — 99204 OFFICE O/P NEW MOD 45 MIN: CPT | Performed by: INTERNAL MEDICINE

## 2024-11-20 PROCEDURE — 83540 ASSAY OF IRON: CPT | Performed by: INTERNAL MEDICINE

## 2024-11-20 PROCEDURE — 85025 COMPLETE CBC W/AUTO DIFF WBC: CPT | Performed by: INTERNAL MEDICINE

## 2024-11-20 PROCEDURE — 36415 COLL VENOUS BLD VENIPUNCTURE: CPT | Performed by: INTERNAL MEDICINE

## 2024-11-25 ENCOUNTER — PATIENT ROUNDING (BHMG ONLY) (OUTPATIENT)
Dept: ONCOLOGY | Facility: CLINIC | Age: 54
End: 2024-11-25

## 2024-11-25 ENCOUNTER — TELEPHONE (OUTPATIENT)
Dept: ONCOLOGY | Facility: CLINIC | Age: 54
End: 2024-11-25
Payer: MEDICAID

## 2024-11-25 NOTE — TELEPHONE ENCOUNTER
Hub is instructed to read the documentation below to patient  Called Pt to let her know I was able to yudelka her f/up w/Dr Guzmán on 12/19 @ 872. No answer, left v/m

## 2024-11-26 LAB
HFE GENE MUT ANL BLD/T: NORMAL
IMP & REVIEW OF LAB RESULTS: NORMAL

## 2024-11-27 ENCOUNTER — TELEPHONE (OUTPATIENT)
Dept: FAMILY MEDICINE CLINIC | Facility: CLINIC | Age: 54
End: 2024-11-27
Payer: MEDICAID

## 2024-11-27 NOTE — TELEPHONE ENCOUNTER
----- Message from Chantal Benitez sent at 11/26/2024  8:05 PM EST -----  Ordered hemochromatosis gene due to elevated TSAT's and elevated iron.  Hemochromatosis gene came back negative.  Iron levels that came back 6 days ago were improved but her ferritin is still elevated.  Her liver enzymes are elevated and they have come up.  Issues with the liver can cause higher ferritin.  Would like to do an ultrasound for further evaluation.  I have placed this order, I do not know that he got scheduled but it looks like someone had reordered this on 11/20/2024

## 2024-11-27 NOTE — TELEPHONE ENCOUNTER
So it looks like hematology oncology is working this up. They ordered additional blood work and the ultrasound. Patient is also scheduled to get the ultrasound that hem/onc ordered done on 11/29/2024  Please advise .

## 2024-12-04 ENCOUNTER — HOSPITAL ENCOUNTER (OUTPATIENT)
Facility: HOSPITAL | Age: 54
Setting detail: OBSERVATION
Discharge: HOME | End: 2024-12-05
Attending: EMERGENCY MEDICINE | Admitting: EMERGENCY MEDICINE
Payer: MEDICAID

## 2024-12-04 ENCOUNTER — APPOINTMENT (OUTPATIENT)
Dept: CT IMAGING | Facility: HOSPITAL | Age: 54
End: 2024-12-04
Payer: MEDICAID

## 2024-12-04 ENCOUNTER — APPOINTMENT (OUTPATIENT)
Dept: GENERAL RADIOLOGY | Facility: HOSPITAL | Age: 54
End: 2024-12-04
Payer: MEDICAID

## 2024-12-04 DIAGNOSIS — R10.11 ABDOMINAL PAIN, ACUTE, RIGHT UPPER QUADRANT: Primary | ICD-10-CM

## 2024-12-04 DIAGNOSIS — J10.1 INFLUENZA A: ICD-10-CM

## 2024-12-04 DIAGNOSIS — R07.9 CHEST PAIN, UNSPECIFIED TYPE: ICD-10-CM

## 2024-12-04 LAB
ALBUMIN SERPL-MCNC: 4 G/DL (ref 3.5–5.2)
ALBUMIN/GLOB SERPL: 1.4 G/DL
ALP SERPL-CCNC: 214 U/L (ref 39–117)
ALT SERPL W P-5'-P-CCNC: 105 U/L (ref 1–33)
AMPHET+METHAMPHET UR QL: NEGATIVE
AMPHETAMINES UR QL: NEGATIVE
ANION GAP SERPL CALCULATED.3IONS-SCNC: 12 MMOL/L (ref 5–15)
AST SERPL-CCNC: 363 U/L (ref 1–32)
BARBITURATES UR QL SCN: NEGATIVE
BASOPHILS # BLD AUTO: 0.02 10*3/MM3 (ref 0–0.2)
BASOPHILS NFR BLD AUTO: 0.4 % (ref 0–1.5)
BENZODIAZ UR QL SCN: NEGATIVE
BILIRUB SERPL-MCNC: 0.5 MG/DL (ref 0–1.2)
BUN SERPL-MCNC: 10 MG/DL (ref 6–20)
BUN/CREAT SERPL: 17.9 (ref 7–25)
BUPRENORPHINE SERPL-MCNC: NEGATIVE NG/ML
CALCIUM SPEC-SCNC: 9.4 MG/DL (ref 8.6–10.5)
CANNABINOIDS SERPL QL: POSITIVE
CHLORIDE SERPL-SCNC: 99 MMOL/L (ref 98–107)
CO2 SERPL-SCNC: 26 MMOL/L (ref 22–29)
COCAINE UR QL: NEGATIVE
CREAT SERPL-MCNC: 0.56 MG/DL (ref 0.57–1)
DEPRECATED RDW RBC AUTO: 53.1 FL (ref 37–54)
EGFRCR SERPLBLD CKD-EPI 2021: 110 ML/MIN/1.73
EOSINOPHIL # BLD AUTO: 0 10*3/MM3 (ref 0–0.4)
EOSINOPHIL NFR BLD AUTO: 0 % (ref 0.3–6.2)
ERYTHROCYTE [DISTWIDTH] IN BLOOD BY AUTOMATED COUNT: 13.8 % (ref 12.3–15.4)
ETHANOL UR QL: <0.01 %
GLOBULIN UR ELPH-MCNC: 2.9 GM/DL
GLUCOSE SERPL-MCNC: 140 MG/DL (ref 65–99)
HCT VFR BLD AUTO: 38.6 % (ref 34–46.6)
HGB BLD-MCNC: 12.2 G/DL (ref 12–15.9)
HOLD SPECIMEN: NORMAL
IMM GRANULOCYTES # BLD AUTO: 0.03 10*3/MM3 (ref 0–0.05)
IMM GRANULOCYTES NFR BLD AUTO: 0.6 % (ref 0–0.5)
LIPASE SERPL-CCNC: 31 U/L (ref 13–60)
LYMPHOCYTES # BLD AUTO: 0.63 10*3/MM3 (ref 0.7–3.1)
LYMPHOCYTES NFR BLD AUTO: 12.4 % (ref 19.6–45.3)
MCH RBC QN AUTO: 33 PG (ref 26.6–33)
MCHC RBC AUTO-ENTMCNC: 31.6 G/DL (ref 31.5–35.7)
MCV RBC AUTO: 104.3 FL (ref 79–97)
METHADONE UR QL SCN: NEGATIVE
MONOCYTES # BLD AUTO: 0.63 10*3/MM3 (ref 0.1–0.9)
MONOCYTES NFR BLD AUTO: 12.4 % (ref 5–12)
NEUTROPHILS NFR BLD AUTO: 3.78 10*3/MM3 (ref 1.7–7)
NEUTROPHILS NFR BLD AUTO: 74.2 % (ref 42.7–76)
NRBC BLD AUTO-RTO: 0 /100 WBC (ref 0–0.2)
OPIATES UR QL: NEGATIVE
OXYCODONE UR QL SCN: NEGATIVE
PCP UR QL SCN: NEGATIVE
PLATELET # BLD AUTO: 184 10*3/MM3 (ref 140–450)
PMV BLD AUTO: 10.4 FL (ref 6–12)
POTASSIUM SERPL-SCNC: 3.4 MMOL/L (ref 3.5–5.2)
PROT SERPL-MCNC: 6.9 G/DL (ref 6–8.5)
RBC # BLD AUTO: 3.7 10*6/MM3 (ref 3.77–5.28)
SODIUM SERPL-SCNC: 137 MMOL/L (ref 136–145)
TRICYCLICS UR QL SCN: POSITIVE
TROPONIN T SERPL HS-MCNC: <6 NG/L
WBC NRBC COR # BLD AUTO: 5.09 10*3/MM3 (ref 3.4–10.8)
WHOLE BLOOD HOLD COAG: NORMAL

## 2024-12-04 PROCEDURE — 96365 THER/PROPH/DIAG IV INF INIT: CPT

## 2024-12-04 PROCEDURE — 80306 DRUG TEST PRSMV INSTRMNT: CPT | Performed by: EMERGENCY MEDICINE

## 2024-12-04 PROCEDURE — 71045 X-RAY EXAM CHEST 1 VIEW: CPT

## 2024-12-04 PROCEDURE — 25010000002 THIAMINE PER 100 MG: Performed by: EMERGENCY MEDICINE

## 2024-12-04 PROCEDURE — 25010000002 PROCHLORPERAZINE 10 MG/2ML SOLUTION: Performed by: EMERGENCY MEDICINE

## 2024-12-04 PROCEDURE — G0378 HOSPITAL OBSERVATION PER HR: HCPCS

## 2024-12-04 PROCEDURE — 82077 ASSAY SPEC XCP UR&BREATH IA: CPT | Performed by: EMERGENCY MEDICINE

## 2024-12-04 PROCEDURE — 74177 CT ABD & PELVIS W/CONTRAST: CPT

## 2024-12-04 PROCEDURE — 80053 COMPREHEN METABOLIC PANEL: CPT | Performed by: EMERGENCY MEDICINE

## 2024-12-04 PROCEDURE — 25810000003 SODIUM CHLORIDE 0.9 % SOLUTION: Performed by: EMERGENCY MEDICINE

## 2024-12-04 PROCEDURE — 25010000002 BENZTROPINE MESYLATE PER 1 MG: Performed by: EMERGENCY MEDICINE

## 2024-12-04 PROCEDURE — 80074 ACUTE HEPATITIS PANEL: CPT | Performed by: PHYSICIAN ASSISTANT

## 2024-12-04 PROCEDURE — 96375 TX/PRO/DX INJ NEW DRUG ADDON: CPT

## 2024-12-04 PROCEDURE — 99285 EMERGENCY DEPT VISIT HI MDM: CPT

## 2024-12-04 PROCEDURE — 85025 COMPLETE CBC W/AUTO DIFF WBC: CPT | Performed by: EMERGENCY MEDICINE

## 2024-12-04 PROCEDURE — 84484 ASSAY OF TROPONIN QUANT: CPT | Performed by: EMERGENCY MEDICINE

## 2024-12-04 PROCEDURE — 83690 ASSAY OF LIPASE: CPT | Performed by: EMERGENCY MEDICINE

## 2024-12-04 PROCEDURE — 25510000001 IOPAMIDOL PER 1 ML: Performed by: EMERGENCY MEDICINE

## 2024-12-04 PROCEDURE — 25010000002 HYDROMORPHONE PER 4 MG: Performed by: EMERGENCY MEDICINE

## 2024-12-04 RX ORDER — VARENICLINE TARTRATE 0.5 MG/1
1 TABLET, FILM COATED ORAL ONCE
Status: COMPLETED | OUTPATIENT
Start: 2024-12-04 | End: 2024-12-05

## 2024-12-04 RX ORDER — IOPAMIDOL 755 MG/ML
100 INJECTION, SOLUTION INTRAVASCULAR
Status: COMPLETED | OUTPATIENT
Start: 2024-12-04 | End: 2024-12-04

## 2024-12-04 RX ORDER — SODIUM CHLORIDE 9 MG/ML
1000 INJECTION, SOLUTION INTRAVENOUS ONCE
Status: COMPLETED | OUTPATIENT
Start: 2024-12-04 | End: 2024-12-04

## 2024-12-04 RX ORDER — SODIUM CHLORIDE 0.9 % (FLUSH) 0.9 %
10 SYRINGE (ML) INJECTION AS NEEDED
Status: DISCONTINUED | OUTPATIENT
Start: 2024-12-04 | End: 2024-12-05 | Stop reason: HOSPADM

## 2024-12-04 RX ORDER — BENZONATATE 100 MG/1
100 CAPSULE ORAL 3 TIMES DAILY PRN
Status: DISCONTINUED | OUTPATIENT
Start: 2024-12-04 | End: 2024-12-05 | Stop reason: HOSPADM

## 2024-12-04 RX ORDER — MELOXICAM 15 MG/1
15 TABLET ORAL ONCE
Status: COMPLETED | OUTPATIENT
Start: 2024-12-04 | End: 2024-12-04

## 2024-12-04 RX ORDER — ROPINIROLE 1 MG/1
2 TABLET, FILM COATED ORAL ONCE
Status: COMPLETED | OUTPATIENT
Start: 2024-12-04 | End: 2024-12-04

## 2024-12-04 RX ORDER — THIAMINE HYDROCHLORIDE 100 MG/ML
200 INJECTION, SOLUTION INTRAMUSCULAR; INTRAVENOUS ONCE
Status: COMPLETED | OUTPATIENT
Start: 2024-12-04 | End: 2024-12-04

## 2024-12-04 RX ORDER — BENZTROPINE MESYLATE 1 MG/ML
2 INJECTION, SOLUTION INTRAMUSCULAR; INTRAVENOUS ONCE
Status: COMPLETED | OUTPATIENT
Start: 2024-12-04 | End: 2024-12-04

## 2024-12-04 RX ORDER — SODIUM CHLORIDE 9 MG/ML
40 INJECTION, SOLUTION INTRAVENOUS AS NEEDED
Status: DISCONTINUED | OUTPATIENT
Start: 2024-12-04 | End: 2024-12-05 | Stop reason: HOSPADM

## 2024-12-04 RX ORDER — ONDANSETRON 2 MG/ML
4 INJECTION INTRAMUSCULAR; INTRAVENOUS EVERY 6 HOURS PRN
Status: DISCONTINUED | OUTPATIENT
Start: 2024-12-04 | End: 2024-12-05 | Stop reason: HOSPADM

## 2024-12-04 RX ORDER — PROCHLORPERAZINE EDISYLATE 5 MG/ML
10 INJECTION INTRAMUSCULAR; INTRAVENOUS ONCE
Status: COMPLETED | OUTPATIENT
Start: 2024-12-04 | End: 2024-12-04

## 2024-12-04 RX ORDER — HYDROMORPHONE HYDROCHLORIDE 1 MG/ML
0.5 INJECTION, SOLUTION INTRAMUSCULAR; INTRAVENOUS; SUBCUTANEOUS
Status: DISCONTINUED | OUTPATIENT
Start: 2024-12-04 | End: 2024-12-05

## 2024-12-04 RX ORDER — SODIUM CHLORIDE 0.9 % (FLUSH) 0.9 %
10 SYRINGE (ML) INJECTION EVERY 12 HOURS SCHEDULED
Status: DISCONTINUED | OUTPATIENT
Start: 2024-12-04 | End: 2024-12-05 | Stop reason: HOSPADM

## 2024-12-04 RX ORDER — PANTOPRAZOLE SODIUM 40 MG/1
40 TABLET, DELAYED RELEASE ORAL DAILY
COMMUNITY

## 2024-12-04 RX ADMIN — FOLIC ACID 1 MG: 5 INJECTION, SOLUTION INTRAMUSCULAR; INTRAVENOUS; SUBCUTANEOUS at 16:13

## 2024-12-04 RX ADMIN — BENZONATATE 100 MG: 100 CAPSULE ORAL at 23:20

## 2024-12-04 RX ADMIN — PROCHLORPERAZINE EDISYLATE 10 MG: 5 INJECTION INTRAMUSCULAR; INTRAVENOUS at 15:31

## 2024-12-04 RX ADMIN — SODIUM CHLORIDE 1000 ML: 9 INJECTION, SOLUTION INTRAVENOUS at 15:30

## 2024-12-04 RX ADMIN — BENZTROPINE MESYLATE 2 MG: 1 INJECTION INTRAMUSCULAR; INTRAVENOUS at 15:32

## 2024-12-04 RX ADMIN — ROPINIROLE HYDROCHLORIDE 2 MG: 1 TABLET, FILM COATED ORAL at 23:20

## 2024-12-04 RX ADMIN — Medication 10 ML: at 20:30

## 2024-12-04 RX ADMIN — MELOXICAM 15 MG: 15 TABLET ORAL at 23:20

## 2024-12-04 RX ADMIN — AMITRIPTYLINE HYDROCHLORIDE 75 MG: 50 TABLET, FILM COATED ORAL at 23:20

## 2024-12-04 RX ADMIN — HYDROMORPHONE HYDROCHLORIDE 0.5 MG: 1 INJECTION, SOLUTION INTRAMUSCULAR; INTRAVENOUS; SUBCUTANEOUS at 23:21

## 2024-12-04 RX ADMIN — IOPAMIDOL 100 ML: 755 INJECTION, SOLUTION INTRAVENOUS at 16:35

## 2024-12-04 RX ADMIN — THIAMINE HYDROCHLORIDE 200 MG: 100 INJECTION, SOLUTION INTRAMUSCULAR; INTRAVENOUS at 15:33
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
## 2024-12-05 ENCOUNTER — READMISSION MANAGEMENT (OUTPATIENT)
Dept: CALL CENTER | Facility: HOSPITAL | Age: 54
End: 2024-12-05
Payer: COMMERCIAL

## 2024-12-05 ENCOUNTER — APPOINTMENT (OUTPATIENT)
Dept: NUCLEAR MEDICINE | Facility: HOSPITAL | Age: 54
End: 2024-12-05
Payer: MEDICAID

## 2024-12-05 ENCOUNTER — TELEPHONE (OUTPATIENT)
Dept: FAMILY MEDICINE CLINIC | Facility: CLINIC | Age: 54
End: 2024-12-05
Payer: COMMERCIAL

## 2024-12-05 ENCOUNTER — APPOINTMENT (OUTPATIENT)
Dept: ULTRASOUND IMAGING | Facility: HOSPITAL | Age: 54
End: 2024-12-05
Payer: MEDICAID

## 2024-12-05 VITALS
SYSTOLIC BLOOD PRESSURE: 116 MMHG | WEIGHT: 184.97 LBS | TEMPERATURE: 98.5 F | HEIGHT: 69 IN | DIASTOLIC BLOOD PRESSURE: 68 MMHG | HEART RATE: 90 BPM | OXYGEN SATURATION: 92 % | RESPIRATION RATE: 15 BRPM | BODY MASS INDEX: 27.4 KG/M2

## 2024-12-05 LAB
ADV 40+41 DNA STL QL NAA+NON-PROBE: NOT DETECTED
ALBUMIN SERPL-MCNC: 3.4 G/DL (ref 3.5–5.2)
ALP SERPL-CCNC: 161 U/L (ref 39–117)
ALT SERPL W P-5'-P-CCNC: 105 U/L (ref 1–33)
ANION GAP SERPL CALCULATED.3IONS-SCNC: 12 MMOL/L (ref 5–15)
AST SERPL-CCNC: 379 U/L (ref 1–32)
ASTRO TYP 1-8 RNA STL QL NAA+NON-PROBE: NOT DETECTED
B PARAPERT DNA SPEC QL NAA+PROBE: NOT DETECTED
B PERT DNA SPEC QL NAA+PROBE: NOT DETECTED
BASOPHILS # BLD AUTO: 0.01 10*3/MM3 (ref 0–0.2)
BASOPHILS NFR BLD AUTO: 0.3 % (ref 0–1.5)
BH CV NUCLEAR PRIOR STUDY: 3
BH CV REST NUCLEAR ISOTOPE DOSE: 11 MCI
BH CV STRESS BP STAGE 1: NORMAL
BH CV STRESS BP STAGE 2: NORMAL
BH CV STRESS BP STAGE 3: NORMAL
BH CV STRESS BP STAGE 4: NORMAL
BH CV STRESS COMMENTS STAGE 1: NORMAL
BH CV STRESS COMMENTS STAGE 2: NORMAL
BH CV STRESS DOSE REGADENOSON STAGE 1: 0.4
BH CV STRESS DURATION MIN STAGE 1: 0
BH CV STRESS DURATION MIN STAGE 2: 4
BH CV STRESS DURATION SEC STAGE 1: 10
BH CV STRESS DURATION SEC STAGE 2: 0
BH CV STRESS HR STAGE 1: 113
BH CV STRESS HR STAGE 2: 104
BH CV STRESS HR STAGE 3: 110
BH CV STRESS HR STAGE 4: 109
BH CV STRESS NUCLEAR ISOTOPE DOSE: 30 MCI
BH CV STRESS PROTOCOL 1: NORMAL
BH CV STRESS RECOVERY BP: NORMAL MMHG
BH CV STRESS RECOVERY HR: 111 BPM
BH CV STRESS STAGE 1: 1
BH CV STRESS STAGE 2: 2
BH CV STRESS STAGE 3: 3
BH CV STRESS STAGE 4: 4
BILIRUB CONJ SERPL-MCNC: 0.2 MG/DL (ref 0–0.3)
BILIRUB INDIRECT SERPL-MCNC: 0.2 MG/DL
BILIRUB SERPL-MCNC: 0.4 MG/DL (ref 0–1.2)
BUN SERPL-MCNC: 10 MG/DL (ref 6–20)
BUN/CREAT SERPL: 21.7 (ref 7–25)
C CAYETANENSIS DNA STL QL NAA+NON-PROBE: NOT DETECTED
C COLI+JEJ+UPSA DNA STL QL NAA+NON-PROBE: NOT DETECTED
C PNEUM DNA NPH QL NAA+NON-PROBE: NOT DETECTED
CALCIUM SPEC-SCNC: 8.5 MG/DL (ref 8.6–10.5)
CHLORIDE SERPL-SCNC: 103 MMOL/L (ref 98–107)
CHOLEST SERPL-MCNC: 148 MG/DL (ref 0–200)
CO2 SERPL-SCNC: 23 MMOL/L (ref 22–29)
CREAT SERPL-MCNC: 0.46 MG/DL (ref 0.57–1)
CRYPTOSP DNA STL QL NAA+NON-PROBE: NOT DETECTED
DEPRECATED RDW RBC AUTO: 53 FL (ref 37–54)
E HISTOLYT DNA STL QL NAA+NON-PROBE: NOT DETECTED
EAEC PAA PLAS AGGR+AATA ST NAA+NON-PRB: NOT DETECTED
EC STX1+STX2 GENES STL QL NAA+NON-PROBE: NOT DETECTED
EGFRCR SERPLBLD CKD-EPI 2021: 115.3 ML/MIN/1.73
EOSINOPHIL # BLD AUTO: 0 10*3/MM3 (ref 0–0.4)
EOSINOPHIL NFR BLD AUTO: 0 % (ref 0.3–6.2)
EPEC EAE GENE STL QL NAA+NON-PROBE: NOT DETECTED
ERYTHROCYTE [DISTWIDTH] IN BLOOD BY AUTOMATED COUNT: 13.7 % (ref 12.3–15.4)
ETEC LTA+ST1A+ST1B TOX ST NAA+NON-PROBE: NOT DETECTED
FLUAV H3 RNA NPH QL NAA+PROBE: DETECTED
FLUBV RNA ISLT QL NAA+PROBE: NOT DETECTED
G LAMBLIA DNA STL QL NAA+NON-PROBE: NOT DETECTED
GEN 5 1HR TROPONIN T REFLEX: <6 NG/L
GLUCOSE SERPL-MCNC: 99 MG/DL (ref 65–99)
HADV DNA SPEC NAA+PROBE: NOT DETECTED
HAV IGM SERPL QL IA: NORMAL
HBV CORE IGM SERPL QL IA: NORMAL
HBV SURFACE AG SERPL QL IA: NORMAL
HCOV 229E RNA SPEC QL NAA+PROBE: NOT DETECTED
HCOV HKU1 RNA SPEC QL NAA+PROBE: NOT DETECTED
HCOV NL63 RNA SPEC QL NAA+PROBE: NOT DETECTED
HCOV OC43 RNA SPEC QL NAA+PROBE: NOT DETECTED
HCT VFR BLD AUTO: 36.4 % (ref 34–46.6)
HCV AB SER QL: NORMAL
HDLC SERPL-MCNC: 20 MG/DL (ref 40–60)
HGB BLD-MCNC: 11.5 G/DL (ref 12–15.9)
HMPV RNA NPH QL NAA+NON-PROBE: NOT DETECTED
HPIV1 RNA ISLT QL NAA+PROBE: NOT DETECTED
HPIV2 RNA SPEC QL NAA+PROBE: NOT DETECTED
HPIV3 RNA NPH QL NAA+PROBE: NOT DETECTED
HPIV4 P GENE NPH QL NAA+PROBE: NOT DETECTED
IMM GRANULOCYTES # BLD AUTO: 0.03 10*3/MM3 (ref 0–0.05)
IMM GRANULOCYTES NFR BLD AUTO: 0.8 % (ref 0–0.5)
LDLC SERPL CALC-MCNC: 97 MG/DL (ref 0–100)
LDLC/HDLC SERPL: 4.67 {RATIO}
LIPASE SERPL-CCNC: 36 U/L (ref 13–60)
LV EF NUC BP: 73 %
LYMPHOCYTES # BLD AUTO: 0.77 10*3/MM3 (ref 0.7–3.1)
LYMPHOCYTES NFR BLD AUTO: 19.5 % (ref 19.6–45.3)
M PNEUMO IGG SER IA-ACNC: NOT DETECTED
MAXIMAL PREDICTED HEART RATE: 168 BPM
MCH RBC QN AUTO: 33 PG (ref 26.6–33)
MCHC RBC AUTO-ENTMCNC: 31.6 G/DL (ref 31.5–35.7)
MCV RBC AUTO: 104.6 FL (ref 79–97)
MONOCYTES # BLD AUTO: 0.47 10*3/MM3 (ref 0.1–0.9)
MONOCYTES NFR BLD AUTO: 11.9 % (ref 5–12)
NEUTROPHILS NFR BLD AUTO: 2.67 10*3/MM3 (ref 1.7–7)
NEUTROPHILS NFR BLD AUTO: 67.5 % (ref 42.7–76)
NOROVIRUS GI+II RNA STL QL NAA+NON-PROBE: NOT DETECTED
NRBC BLD AUTO-RTO: 0 /100 WBC (ref 0–0.2)
P SHIGELLOIDES DNA STL QL NAA+NON-PROBE: NOT DETECTED
PERCENT MAX PREDICTED HR: 67.26 %
PLATELET # BLD AUTO: 173 10*3/MM3 (ref 140–450)
PMV BLD AUTO: 10.2 FL (ref 6–12)
POTASSIUM SERPL-SCNC: 3.4 MMOL/L (ref 3.5–5.2)
PROT SERPL-MCNC: 5.9 G/DL (ref 6–8.5)
RBC # BLD AUTO: 3.48 10*6/MM3 (ref 3.77–5.28)
RHINOVIRUS RNA SPEC NAA+PROBE: DETECTED
RSV RNA NPH QL NAA+NON-PROBE: NOT DETECTED
RVA RNA STL QL NAA+NON-PROBE: NOT DETECTED
S ENT+BONG DNA STL QL NAA+NON-PROBE: NOT DETECTED
SAPO I+II+IV+V RNA STL QL NAA+NON-PROBE: NOT DETECTED
SARS-COV-2 RNA NPH QL NAA+NON-PROBE: NOT DETECTED
SHIGELLA SP+EIEC IPAH ST NAA+NON-PROBE: NOT DETECTED
SODIUM SERPL-SCNC: 138 MMOL/L (ref 136–145)
STRESS BASELINE BP: NORMAL MMHG
STRESS BASELINE HR: 101 BPM
STRESS PERCENT HR: 79 %
STRESS POST PEAK BP: NORMAL MMHG
STRESS POST PEAK HR: 113 BPM
STRESS TARGET HR: 143 BPM
TRIGL SERPL-MCNC: 173 MG/DL (ref 0–150)
TROPONIN T NUMERIC DELTA: NORMAL
TROPONIN T SERPL HS-MCNC: 8 NG/L
V CHOL+PARA+VUL DNA STL QL NAA+NON-PROBE: NOT DETECTED
V CHOLERAE DNA STL QL NAA+NON-PROBE: NOT DETECTED
VLDLC SERPL-MCNC: 31 MG/DL (ref 5–40)
WBC NRBC COR # BLD AUTO: 3.95 10*3/MM3 (ref 3.4–10.8)
Y ENTEROCOL DNA STL QL NAA+NON-PROBE: NOT DETECTED

## 2024-12-05 PROCEDURE — 87507 IADNA-DNA/RNA PROBE TQ 12-25: CPT | Performed by: PHYSICIAN ASSISTANT

## 2024-12-05 PROCEDURE — 78452 HT MUSCLE IMAGE SPECT MULT: CPT | Performed by: STUDENT IN AN ORGANIZED HEALTH CARE EDUCATION/TRAINING PROGRAM

## 2024-12-05 PROCEDURE — 84484 ASSAY OF TROPONIN QUANT: CPT | Performed by: EMERGENCY MEDICINE

## 2024-12-05 PROCEDURE — G0378 HOSPITAL OBSERVATION PER HR: HCPCS

## 2024-12-05 PROCEDURE — 0202U NFCT DS 22 TRGT SARS-COV-2: CPT | Performed by: PHYSICIAN ASSISTANT

## 2024-12-05 PROCEDURE — 80076 HEPATIC FUNCTION PANEL: CPT | Performed by: PHYSICIAN ASSISTANT

## 2024-12-05 PROCEDURE — 99204 OFFICE O/P NEW MOD 45 MIN: CPT | Performed by: STUDENT IN AN ORGANIZED HEALTH CARE EDUCATION/TRAINING PROGRAM

## 2024-12-05 PROCEDURE — 78452 HT MUSCLE IMAGE SPECT MULT: CPT

## 2024-12-05 PROCEDURE — 93017 CV STRESS TEST TRACING ONLY: CPT

## 2024-12-05 PROCEDURE — 25010000002 REGADENOSON 0.4 MG/5ML SOLUTION: Performed by: EMERGENCY MEDICINE

## 2024-12-05 PROCEDURE — 80048 BASIC METABOLIC PNL TOTAL CA: CPT | Performed by: PHYSICIAN ASSISTANT

## 2024-12-05 PROCEDURE — 83690 ASSAY OF LIPASE: CPT | Performed by: EMERGENCY MEDICINE

## 2024-12-05 PROCEDURE — 93018 CV STRESS TEST I&R ONLY: CPT | Performed by: STUDENT IN AN ORGANIZED HEALTH CARE EDUCATION/TRAINING PROGRAM

## 2024-12-05 PROCEDURE — 80061 LIPID PANEL: CPT | Performed by: EMERGENCY MEDICINE

## 2024-12-05 PROCEDURE — 34310000005 TECHNETIUM SESTAMIBI: Performed by: EMERGENCY MEDICINE

## 2024-12-05 PROCEDURE — 85025 COMPLETE CBC W/AUTO DIFF WBC: CPT | Performed by: EMERGENCY MEDICINE

## 2024-12-05 PROCEDURE — A9500 TC99M SESTAMIBI: HCPCS | Performed by: EMERGENCY MEDICINE

## 2024-12-05 PROCEDURE — 76705 ECHO EXAM OF ABDOMEN: CPT

## 2024-12-05 RX ORDER — VARENICLINE TARTRATE 0.5 MG/1
1 TABLET, FILM COATED ORAL 2 TIMES DAILY
Status: DISCONTINUED | OUTPATIENT
Start: 2024-12-05 | End: 2024-12-05 | Stop reason: HOSPADM

## 2024-12-05 RX ORDER — CETIRIZINE HYDROCHLORIDE 10 MG/1
10 TABLET ORAL DAILY
Status: DISCONTINUED | OUTPATIENT
Start: 2024-12-05 | End: 2024-12-05 | Stop reason: HOSPADM

## 2024-12-05 RX ORDER — SUCRALFATE 1 G/1
1 TABLET ORAL
Qty: 90 TABLET | Refills: 0 | Status: SHIPPED | OUTPATIENT
Start: 2024-12-05 | End: 2025-01-04

## 2024-12-05 RX ORDER — HYDROCODONE POLISTIREX AND CHLORPHENIRAMINE POLISTIREX 10; 8 MG/5ML; MG/5ML
5 SUSPENSION, EXTENDED RELEASE ORAL EVERY 12 HOURS PRN
Qty: 30 ML | Refills: 0 | Status: SHIPPED | OUTPATIENT
Start: 2024-12-05 | End: 2024-12-05

## 2024-12-05 RX ORDER — ALBUTEROL SULFATE 0.83 MG/ML
2.5 SOLUTION RESPIRATORY (INHALATION) EVERY 6 HOURS PRN
Status: DISCONTINUED | OUTPATIENT
Start: 2024-12-05 | End: 2024-12-05 | Stop reason: HOSPADM

## 2024-12-05 RX ORDER — ROPINIROLE 1 MG/1
4 TABLET, FILM COATED ORAL NIGHTLY
Status: DISCONTINUED | OUTPATIENT
Start: 2024-12-05 | End: 2024-12-05 | Stop reason: HOSPADM

## 2024-12-05 RX ORDER — HYDROCODONE POLISTIREX AND CHLORPHENIRAMINE POLISTIREX 10; 8 MG/5ML; MG/5ML
5 SUSPENSION, EXTENDED RELEASE ORAL EVERY 12 HOURS PRN
Qty: 30 ML | Refills: 0 | Status: SHIPPED | OUTPATIENT
Start: 2024-12-05 | End: 2024-12-17 | Stop reason: HOSPADM

## 2024-12-05 RX ORDER — DICYCLOMINE HYDROCHLORIDE 10 MG/1
10 CAPSULE ORAL
Status: DISCONTINUED | OUTPATIENT
Start: 2024-12-05 | End: 2024-12-05 | Stop reason: HOSPADM

## 2024-12-05 RX ORDER — DULOXETIN HYDROCHLORIDE 30 MG/1
120 CAPSULE, DELAYED RELEASE ORAL DAILY
Status: DISCONTINUED | OUTPATIENT
Start: 2024-12-05 | End: 2024-12-05 | Stop reason: HOSPADM

## 2024-12-05 RX ORDER — POTASSIUM CHLORIDE 1500 MG/1
40 TABLET, EXTENDED RELEASE ORAL EVERY 4 HOURS
Status: COMPLETED | OUTPATIENT
Start: 2024-12-05 | End: 2024-12-05

## 2024-12-05 RX ORDER — PANTOPRAZOLE SODIUM 40 MG/1
40 TABLET, DELAYED RELEASE ORAL DAILY
Status: DISCONTINUED | OUTPATIENT
Start: 2024-12-05 | End: 2024-12-05 | Stop reason: HOSPADM

## 2024-12-05 RX ORDER — REGADENOSON 0.08 MG/ML
0.4 INJECTION, SOLUTION INTRAVENOUS
Status: COMPLETED | OUTPATIENT
Start: 2024-12-05 | End: 2024-12-05

## 2024-12-05 RX ORDER — TOPIRAMATE 100 MG/1
200 TABLET, FILM COATED ORAL 2 TIMES DAILY
Status: DISCONTINUED | OUTPATIENT
Start: 2024-12-05 | End: 2024-12-05 | Stop reason: HOSPADM

## 2024-12-05 RX ORDER — HYDROCODONE POLISTIREX AND CHLORPHENIRAMINE POLISTIREX 10; 8 MG/5ML; MG/5ML
5 SUSPENSION, EXTENDED RELEASE ORAL EVERY 12 HOURS PRN
Status: DISCONTINUED | OUTPATIENT
Start: 2024-12-05 | End: 2024-12-05 | Stop reason: HOSPADM

## 2024-12-05 RX ORDER — HYDROCODONE BITARTRATE AND HOMATROPINE METHYLBROMIDE 5; 1.5 MG/1; MG/1
1 TABLET ORAL EVERY 4 HOURS PRN
Status: DISCONTINUED | OUTPATIENT
Start: 2024-12-05 | End: 2024-12-05

## 2024-12-05 RX ADMIN — PANTOPRAZOLE SODIUM 40 MG: 40 TABLET, DELAYED RELEASE ORAL at 10:18

## 2024-12-05 RX ADMIN — DICYCLOMINE HYDROCHLORIDE 10 MG: 10 CAPSULE ORAL at 14:47

## 2024-12-05 RX ADMIN — TECHNETIUM TC 99M SESTAMIBI 1 DOSE: 1 INJECTION INTRAVENOUS at 07:11

## 2024-12-05 RX ADMIN — DICYCLOMINE HYDROCHLORIDE 10 MG: 10 CAPSULE ORAL at 10:18

## 2024-12-05 RX ADMIN — CETIRIZINE HYDROCHLORIDE 10 MG: 10 TABLET, FILM COATED ORAL at 10:18

## 2024-12-05 RX ADMIN — TOPIRAMATE 200 MG: 100 TABLET, FILM COATED ORAL at 10:18

## 2024-12-05 RX ADMIN — REGADENOSON 0.4 MG: 0.08 INJECTION, SOLUTION INTRAVENOUS at 08:19

## 2024-12-05 RX ADMIN — HYDROCODONE POLISTIREX AND CHLORPHENIRAMINE POLISTIREX 5 ML: 10; 8 SUSPENSION, EXTENDED RELEASE ORAL at 12:21

## 2024-12-05 RX ADMIN — VARENICLINE TARTRATE 1 MG: 0.5 TABLET, FILM COATED ORAL at 01:16

## 2024-12-05 RX ADMIN — Medication 10 ML: at 09:55

## 2024-12-05 RX ADMIN — POTASSIUM CHLORIDE 40 MEQ: 1500 TABLET, EXTENDED RELEASE ORAL at 12:21

## 2024-12-05 RX ADMIN — DULOXETINE HYDROCHLORIDE 120 MG: 30 CAPSULE, DELAYED RELEASE ORAL at 10:18

## 2024-12-05 RX ADMIN — TECHNETIUM TC 99M SESTAMIBI 1 DOSE: 1 INJECTION INTRAVENOUS at 08:20

## 2024-12-05 RX ADMIN — POTASSIUM CHLORIDE 40 MEQ: 1500 TABLET, EXTENDED RELEASE ORAL at 15:35
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
## 2024-12-05 NOTE — TELEPHONE ENCOUNTER
ARVIN 12/05/2024, 12:30 pm advised Cristina we have not seen pt since 09/30/2024.  Unable to complete paperwork.    Requested she return my call

## 2024-12-05 NOTE — TELEPHONE ENCOUNTER
Caller: [facility] [employer]    Relationship:     Best call back number: [phone] [facility] [id number]    What is the best time to reach you: ANY    Who are you requesting to speak with (clinical staff, provider,  specific staff member): PCP        What was the call regarding: FAXED OVER PAPERWORK ON 11/21/24 AND WANTING A STATUS UPDATE. IF RECEIVED, PLEASE FAX BACK [phone]

## 2024-12-06 ENCOUNTER — TRANSITIONAL CARE MANAGEMENT TELEPHONE ENCOUNTER (OUTPATIENT)
Dept: CALL CENTER | Facility: HOSPITAL | Age: 54
End: 2024-12-06
Payer: COMMERCIAL

## 2024-12-06 RX ORDER — METHYLPREDNISOLONE 4 MG/1
1 TABLET ORAL DAILY
Qty: 21 TABLET | Refills: 0 | Status: SHIPPED | OUTPATIENT
Start: 2024-12-06 | End: 2024-12-12

## 2024-12-06 NOTE — OUTREACH NOTE
Call Center TCM Note      Flowsheet Row Responses   [facility] patient discharged from? [facility]   Does the patient have one of the following disease processes/diagnoses(primary or secondary)? Other   TCM attempt successful? Yes   Call start time 0823   Call end time 0849   Discharge diagnosis Chest pain   Person spoke with today (if not patient) and relationship pt   Meds reviewed with patient/caregiver? Yes   Is the patient having any side effects they believe may be caused by any medication additions or changes? No   Does the patient have all medications ordered at discharge? Yes   Is the patient taking all medications as directed (includes completed medication regime)? Yes   Comments Pt needs fu appts with GI/cardiology   Does the patient have an appointment with their PCP within 7-14 days of discharge? Yes  [12/13/2024 at 2:15 PM]   Psychosocial issues? No   Did the patient receive a copy of their discharge instructions? Yes   Nursing interventions Reviewed instructions with patient   What is the patient's perception of their health status since discharge? Improving   Is the patient/caregiver able to teach back signs and symptoms related to disease process for when to call PCP? Yes   Is the patient/caregiver able to teach back signs and symptoms related to disease process for when to call 911? Yes   Is the patient/caregiver able to teach back the hierarchy of who to call/visit for symptoms/problems? PCP, Specialist, Home health nurse, Urgent Care, ED, 911 Yes   If the patient is a current smoker, are they able to teach back resources for cessation? Not a smoker   TCM call completed? Yes   Wrap up additional comments Pt reports coughing so hard she has chest pain. Prescription for Tussionex [facility]kin[facility] [place]. Message routed back to hospital CM to help with a different cough med. Pt shares Tessalon Perles do not work. Pt verified PCP fu appt   Call end time  0849   Would this patient benefit from a Referral to [facility] Social Work? No   Is the patient interested in additional calls from an ambulatory ? No            Shadia Lei RN    12/6/2024, 08:53 EST

## 2024-12-06 NOTE — OUTREACH NOTE
Case Management Call Center Follow-up      Flowsheet Row Responses   [facility] Call Center Tracking Reason? Medication Clarification   Has the Call Center Case Management Follow-up issue been resolved? Yes   Follow-up Comments Per NP, will send patient a sterioid. CM confirmed delivery. CM called patient to inform, no answer, CM left detailed message about new medication.            Karen Koehler RN    12/6/2024, 10:50 EST

## 2024-12-06 NOTE — OUTREACH NOTE
Prep Survey      Flowsheet Row Responses   [facility] patient discharged from? [facility]   Is LACE score < 7 ? Yes   Eligibility [facility]   Date of Admission 12/04/24   Date of Discharge 12/05/24   Discharge Disposition Home or Self Care   Discharge diagnosis Chest pain   Does the patient have one of the following disease processes/diagnoses(primary or secondary)? Other   Does the patient have Home health ordered? No   Is there a DME ordered? No   Medication alerts for this patient see AVS   Prep survey completed? Yes            Rena WILLS - Registered Nurse

## 2024-12-06 NOTE — OUTREACH NOTE
Case Management Call Center Follow-up      Flowsheet Row Responses   [facility] Call Center Tracking Reason? Medication Clarification   Has the Call Center Case Management Follow-up issue been resolved? No   Follow-up Comments CM spoke with [facility] Pharmacist who stated they do not have it in stock, also stated patient's insurance does not cover med. OTONIEL sent secure chat to NP inquiring about another medication, pending review.            Karen Koehler RN    12/6/2024, 10:03 EST

## 2024-12-09 NOTE — CASE MANAGEMENT/SOCIAL WORK
Case Management Discharge Note      Final Note: Routine home         Selected Continued Care - Discharged on 12/5/2024 Admission date: 12/4/2024 - Discharge disposition: Home or Self Care       Transportation Services  Private: Car    Final Discharge Disposition Code: 01 - home or self-care

## 2024-12-12 ENCOUNTER — APPOINTMENT (OUTPATIENT)
Dept: GENERAL RADIOLOGY | Facility: HOSPITAL | Age: 54
End: 2024-12-12
Payer: MEDICAID

## 2024-12-12 ENCOUNTER — APPOINTMENT (OUTPATIENT)
Dept: CT IMAGING | Facility: HOSPITAL | Age: 54
End: 2024-12-12
Payer: MEDICAID

## 2024-12-12 ENCOUNTER — HOSPITAL ENCOUNTER (INPATIENT)
Facility: HOSPITAL | Age: 54
LOS: 5 days | Discharge: HOME | End: 2024-12-17
Attending: INTERNAL MEDICINE | Admitting: INTERNAL MEDICINE
Payer: MEDICAID

## 2024-12-12 DIAGNOSIS — R06.00 DYSPNEA, UNSPECIFIED TYPE: ICD-10-CM

## 2024-12-12 DIAGNOSIS — E83.42 HYPOMAGNESEMIA: ICD-10-CM

## 2024-12-12 DIAGNOSIS — J18.9 PNEUMONIA OF LEFT LOWER LOBE DUE TO INFECTIOUS ORGANISM: Primary | ICD-10-CM

## 2024-12-12 DIAGNOSIS — E87.6 HYPOKALEMIA: ICD-10-CM

## 2024-12-12 LAB
ALBUMIN SERPL-MCNC: 3.7 G/DL (ref 3.5–5.2)
ALBUMIN/GLOB SERPL: 1 G/DL
ALP SERPL-CCNC: 227 U/L (ref 39–117)
ALT SERPL W P-5'-P-CCNC: 81 U/L (ref 1–33)
ANION GAP SERPL CALCULATED.3IONS-SCNC: 18.4 MMOL/L (ref 5–15)
AST SERPL-CCNC: 61 U/L (ref 1–32)
B PARAPERT DNA SPEC QL NAA+PROBE: NOT DETECTED
B PERT DNA SPEC QL NAA+PROBE: NOT DETECTED
BACTERIA UR QL AUTO: ABNORMAL /HPF
BASOPHILS # BLD AUTO: 0.03 10*3/MM3 (ref 0–0.2)
BASOPHILS NFR BLD AUTO: 0.2 % (ref 0–1.5)
BILIRUB SERPL-MCNC: 1.1 MG/DL (ref 0–1.2)
BILIRUB UR QL STRIP: ABNORMAL
BUN SERPL-MCNC: 11 MG/DL (ref 6–20)
BUN/CREAT SERPL: 19.3 (ref 7–25)
C PNEUM DNA NPH QL NAA+NON-PROBE: NOT DETECTED
CALCIUM SPEC-SCNC: 9.7 MG/DL (ref 8.6–10.5)
CHLORIDE SERPL-SCNC: 99 MMOL/L (ref 98–107)
CLARITY UR: ABNORMAL
CO2 SERPL-SCNC: 20.6 MMOL/L (ref 22–29)
COLOR UR: ABNORMAL
CREAT SERPL-MCNC: 0.57 MG/DL (ref 0.57–1)
D DIMER PPP FEU-MCNC: 0.93 MCGFEU/ML (ref 0–0.52)
D-LACTATE SERPL-SCNC: 1.8 MMOL/L (ref 0.5–2)
DEPRECATED RDW RBC AUTO: 45.9 FL (ref 37–54)
EGFRCR SERPLBLD CKD-EPI 2021: 109.5 ML/MIN/1.73
EOSINOPHIL # BLD AUTO: 0.03 10*3/MM3 (ref 0–0.4)
EOSINOPHIL NFR BLD AUTO: 0.2 % (ref 0.3–6.2)
ERYTHROCYTE [DISTWIDTH] IN BLOOD BY AUTOMATED COUNT: 12.8 % (ref 12.3–15.4)
FLUAV SUBTYP SPEC NAA+PROBE: NOT DETECTED
FLUBV RNA ISLT QL NAA+PROBE: NOT DETECTED
GEN 5 1HR TROPONIN T REFLEX: 7 NG/L
GLOBULIN UR ELPH-MCNC: 3.6 GM/DL
GLUCOSE SERPL-MCNC: 137 MG/DL (ref 65–99)
GLUCOSE UR STRIP-MCNC: NEGATIVE MG/DL
HADV DNA SPEC NAA+PROBE: NOT DETECTED
HCOV 229E RNA SPEC QL NAA+PROBE: NOT DETECTED
HCOV HKU1 RNA SPEC QL NAA+PROBE: NOT DETECTED
HCOV NL63 RNA SPEC QL NAA+PROBE: NOT DETECTED
HCOV OC43 RNA SPEC QL NAA+PROBE: NOT DETECTED
HCT VFR BLD AUTO: 39.4 % (ref 34–46.6)
HGB BLD-MCNC: 13.5 G/DL (ref 12–15.9)
HGB UR QL STRIP.AUTO: NEGATIVE
HMPV RNA NPH QL NAA+NON-PROBE: NOT DETECTED
HOLD SPECIMEN: NORMAL
HOLD SPECIMEN: NORMAL
HPIV1 RNA ISLT QL NAA+PROBE: NOT DETECTED
HPIV2 RNA SPEC QL NAA+PROBE: NOT DETECTED
HPIV3 RNA NPH QL NAA+PROBE: NOT DETECTED
HPIV4 P GENE NPH QL NAA+PROBE: NOT DETECTED
HYALINE CASTS UR QL AUTO: ABNORMAL /LPF
IMM GRANULOCYTES # BLD AUTO: 0.15 10*3/MM3 (ref 0–0.05)
IMM GRANULOCYTES NFR BLD AUTO: 0.9 % (ref 0–0.5)
KETONES UR QL STRIP: ABNORMAL
LEUKOCYTE ESTERASE UR QL STRIP.AUTO: ABNORMAL
LYMPHOCYTES # BLD AUTO: 1.76 10*3/MM3 (ref 0.7–3.1)
LYMPHOCYTES NFR BLD AUTO: 10.7 % (ref 19.6–45.3)
M PNEUMO IGG SER IA-ACNC: NOT DETECTED
MAGNESIUM SERPL-MCNC: 1.4 MG/DL (ref 1.6–2.6)
MCH RBC QN AUTO: 33.5 PG (ref 26.6–33)
MCHC RBC AUTO-ENTMCNC: 34.3 G/DL (ref 31.5–35.7)
MCV RBC AUTO: 97.8 FL (ref 79–97)
MONOCYTES # BLD AUTO: 0.82 10*3/MM3 (ref 0.1–0.9)
MONOCYTES NFR BLD AUTO: 5 % (ref 5–12)
NEUTROPHILS NFR BLD AUTO: 13.66 10*3/MM3 (ref 1.7–7)
NEUTROPHILS NFR BLD AUTO: 83 % (ref 42.7–76)
NITRITE UR QL STRIP: POSITIVE
NRBC BLD AUTO-RTO: 0 /100 WBC (ref 0–0.2)
PH UR STRIP.AUTO: <=5 [PH] (ref 5–8)
PLATELET # BLD AUTO: 246 10*3/MM3 (ref 140–450)
PMV BLD AUTO: 10.6 FL (ref 6–12)
POTASSIUM SERPL-SCNC: 2.6 MMOL/L (ref 3.5–5.2)
PROCALCITONIN SERPL-MCNC: 0.92 NG/ML (ref 0–0.25)
PROT SERPL-MCNC: 7.3 G/DL (ref 6–8.5)
PROT UR QL STRIP: ABNORMAL
QT INTERVAL: 284 MS
QTC INTERVAL: 385 MS
RBC # BLD AUTO: 4.03 10*6/MM3 (ref 3.77–5.28)
RBC # UR STRIP: ABNORMAL /HPF
REF LAB TEST METHOD: ABNORMAL
RHINOVIRUS RNA SPEC NAA+PROBE: NOT DETECTED
RSV RNA NPH QL NAA+NON-PROBE: NOT DETECTED
SARS-COV-2 RNA RESP QL NAA+PROBE: NOT DETECTED
SODIUM SERPL-SCNC: 138 MMOL/L (ref 136–145)
SP GR UR STRIP: 1.03 (ref 1–1.03)
SQUAMOUS #/AREA URNS HPF: ABNORMAL /HPF
TROPONIN T NUMERIC DELTA: 0 NG/L
TROPONIN T SERPL HS-MCNC: 7 NG/L
UROBILINOGEN UR QL STRIP: ABNORMAL
WBC # UR STRIP: ABNORMAL /HPF
WBC NRBC COR # BLD AUTO: 16.45 10*3/MM3 (ref 3.4–10.8)
WHOLE BLOOD HOLD COAG: NORMAL
WHOLE BLOOD HOLD SPECIMEN: NORMAL

## 2024-12-12 PROCEDURE — 25010000002 CEFTRIAXONE PER 250 MG

## 2024-12-12 PROCEDURE — 94640 AIRWAY INHALATION TREATMENT: CPT

## 2024-12-12 PROCEDURE — 84484 ASSAY OF TROPONIN QUANT: CPT

## 2024-12-12 PROCEDURE — 94761 N-INVAS EAR/PLS OXIMETRY MLT: CPT

## 2024-12-12 PROCEDURE — 85025 COMPLETE CBC W/AUTO DIFF WBC: CPT

## 2024-12-12 PROCEDURE — 80053 COMPREHEN METABOLIC PANEL: CPT

## 2024-12-12 PROCEDURE — 25010000002 METHYLPREDNISOLONE PER 40 MG

## 2024-12-12 PROCEDURE — G0378 HOSPITAL OBSERVATION PER HR: HCPCS

## 2024-12-12 PROCEDURE — 36415 COLL VENOUS BLD VENIPUNCTURE: CPT

## 2024-12-12 PROCEDURE — 93005 ELECTROCARDIOGRAM TRACING: CPT

## 2024-12-12 PROCEDURE — 71045 X-RAY EXAM CHEST 1 VIEW: CPT

## 2024-12-12 PROCEDURE — 94799 UNLISTED PULMONARY SVC/PX: CPT

## 2024-12-12 PROCEDURE — 25010000002 MAGNESIUM SULFATE 2 GM/50ML SOLUTION: Performed by: EMERGENCY MEDICINE

## 2024-12-12 PROCEDURE — 94664 DEMO&/EVAL PT USE INHALER: CPT

## 2024-12-12 PROCEDURE — 87449 NOS EACH ORGANISM AG IA: CPT | Performed by: INTERNAL MEDICINE

## 2024-12-12 PROCEDURE — 83605 ASSAY OF LACTIC ACID: CPT

## 2024-12-12 PROCEDURE — 25010000002 METHYLPREDNISOLONE PER 125 MG

## 2024-12-12 PROCEDURE — 0202U NFCT DS 22 TRGT SARS-COV-2: CPT

## 2024-12-12 PROCEDURE — 25510000001 IOPAMIDOL PER 1 ML

## 2024-12-12 PROCEDURE — 83735 ASSAY OF MAGNESIUM: CPT

## 2024-12-12 PROCEDURE — 25010000002 MAGNESIUM SULFATE 2 GM/50ML SOLUTION: Performed by: INTERNAL MEDICINE

## 2024-12-12 PROCEDURE — 99285 EMERGENCY DEPT VISIT HI MDM: CPT

## 2024-12-12 PROCEDURE — 85379 FIBRIN DEGRADATION QUANT: CPT

## 2024-12-12 PROCEDURE — 25010000002 POTASSIUM CHLORIDE 10 MEQ/100ML SOLUTION: Performed by: INTERNAL MEDICINE

## 2024-12-12 PROCEDURE — 84145 PROCALCITONIN (PCT): CPT

## 2024-12-12 PROCEDURE — 71275 CT ANGIOGRAPHY CHEST: CPT

## 2024-12-12 PROCEDURE — 81001 URINALYSIS AUTO W/SCOPE: CPT

## 2024-12-12 RX ORDER — IPRATROPIUM BROMIDE AND ALBUTEROL SULFATE 2.5; .5 MG/3ML; MG/3ML
3 SOLUTION RESPIRATORY (INHALATION)
Status: DISCONTINUED | OUTPATIENT
Start: 2024-12-12 | End: 2024-12-17 | Stop reason: HOSPADM

## 2024-12-12 RX ORDER — VARENICLINE TARTRATE 0.5 MG/1
1 TABLET, FILM COATED ORAL 2 TIMES DAILY
Status: CANCELLED | OUTPATIENT
Start: 2024-12-12

## 2024-12-12 RX ORDER — ACETAMINOPHEN 160 MG/5ML
650 SOLUTION ORAL EVERY 4 HOURS PRN
Status: DISCONTINUED | OUTPATIENT
Start: 2024-12-12 | End: 2024-12-17 | Stop reason: HOSPADM

## 2024-12-12 RX ORDER — BISACODYL 5 MG/1
5 TABLET, DELAYED RELEASE ORAL DAILY PRN
Status: DISCONTINUED | OUTPATIENT
Start: 2024-12-12 | End: 2024-12-17 | Stop reason: HOSPADM

## 2024-12-12 RX ORDER — AMOXICILLIN 250 MG
2 CAPSULE ORAL 2 TIMES DAILY PRN
Status: DISCONTINUED | OUTPATIENT
Start: 2024-12-12 | End: 2024-12-17 | Stop reason: HOSPADM

## 2024-12-12 RX ORDER — METHYLPREDNISOLONE SODIUM SUCCINATE 125 MG/2ML
125 INJECTION, POWDER, LYOPHILIZED, FOR SOLUTION INTRAMUSCULAR; INTRAVENOUS ONCE
Status: COMPLETED | OUTPATIENT
Start: 2024-12-12 | End: 2024-12-12

## 2024-12-12 RX ORDER — MAGNESIUM SULFATE HEPTAHYDRATE 40 MG/ML
2 INJECTION, SOLUTION INTRAVENOUS
Status: COMPLETED | OUTPATIENT
Start: 2024-12-12 | End: 2024-12-13

## 2024-12-12 RX ORDER — SODIUM CHLORIDE 0.9 % (FLUSH) 0.9 %
10 SYRINGE (ML) INJECTION EVERY 12 HOURS SCHEDULED
Status: DISCONTINUED | OUTPATIENT
Start: 2024-12-12 | End: 2024-12-17 | Stop reason: HOSPADM

## 2024-12-12 RX ORDER — ACETAMINOPHEN 650 MG/1
650 SUPPOSITORY RECTAL EVERY 4 HOURS PRN
Status: DISCONTINUED | OUTPATIENT
Start: 2024-12-12 | End: 2024-12-17 | Stop reason: HOSPADM

## 2024-12-12 RX ORDER — DULOXETIN HYDROCHLORIDE 30 MG/1
120 CAPSULE, DELAYED RELEASE ORAL NIGHTLY
Status: CANCELLED | OUTPATIENT
Start: 2024-12-12

## 2024-12-12 RX ORDER — ONDANSETRON 2 MG/ML
4 INJECTION INTRAMUSCULAR; INTRAVENOUS EVERY 6 HOURS PRN
Status: DISCONTINUED | OUTPATIENT
Start: 2024-12-12 | End: 2024-12-17 | Stop reason: HOSPADM

## 2024-12-12 RX ORDER — BISACODYL 10 MG
10 SUPPOSITORY, RECTAL RECTAL DAILY PRN
Status: DISCONTINUED | OUTPATIENT
Start: 2024-12-12 | End: 2024-12-17 | Stop reason: HOSPADM

## 2024-12-12 RX ORDER — ACETAMINOPHEN 325 MG/1
650 TABLET ORAL EVERY 4 HOURS PRN
Status: DISCONTINUED | OUTPATIENT
Start: 2024-12-12 | End: 2024-12-17 | Stop reason: HOSPADM

## 2024-12-12 RX ORDER — POTASSIUM CHLORIDE 7.45 MG/ML
10 INJECTION INTRAVENOUS
Status: DISPENSED | OUTPATIENT
Start: 2024-12-12 | End: 2024-12-12

## 2024-12-12 RX ORDER — GUAIFENESIN 600 MG/1
1200 TABLET, EXTENDED RELEASE ORAL EVERY 12 HOURS SCHEDULED
Status: DISCONTINUED | OUTPATIENT
Start: 2024-12-12 | End: 2024-12-14

## 2024-12-12 RX ORDER — METHYLPREDNISOLONE SODIUM SUCCINATE 40 MG/ML
40 INJECTION, POWDER, LYOPHILIZED, FOR SOLUTION INTRAMUSCULAR; INTRAVENOUS EVERY 12 HOURS
Status: DISCONTINUED | OUTPATIENT
Start: 2024-12-12 | End: 2024-12-17 | Stop reason: HOSPADM

## 2024-12-12 RX ORDER — NITROGLYCERIN 0.4 MG/1
0.4 TABLET SUBLINGUAL
Status: DISCONTINUED | OUTPATIENT
Start: 2024-12-12 | End: 2024-12-17 | Stop reason: HOSPADM

## 2024-12-12 RX ORDER — POLYETHYLENE GLYCOL 3350 17 G/17G
17 POWDER, FOR SOLUTION ORAL DAILY PRN
Status: DISCONTINUED | OUTPATIENT
Start: 2024-12-12 | End: 2024-12-17 | Stop reason: HOSPADM

## 2024-12-12 RX ORDER — ROPINIROLE 1 MG/1
4 TABLET, FILM COATED ORAL NIGHTLY
Status: CANCELLED | OUTPATIENT
Start: 2024-12-12

## 2024-12-12 RX ORDER — ONDANSETRON 4 MG/1
4 TABLET, ORALLY DISINTEGRATING ORAL EVERY 6 HOURS PRN
Status: DISCONTINUED | OUTPATIENT
Start: 2024-12-12 | End: 2024-12-17 | Stop reason: HOSPADM

## 2024-12-12 RX ORDER — SODIUM CHLORIDE 9 MG/ML
40 INJECTION, SOLUTION INTRAVENOUS AS NEEDED
Status: DISCONTINUED | OUTPATIENT
Start: 2024-12-12 | End: 2024-12-17 | Stop reason: HOSPADM

## 2024-12-12 RX ORDER — IOPAMIDOL 755 MG/ML
100 INJECTION, SOLUTION INTRAVASCULAR
Status: COMPLETED | OUTPATIENT
Start: 2024-12-12 | End: 2024-12-12

## 2024-12-12 RX ORDER — METHYLPREDNISOLONE SODIUM SUCCINATE 40 MG/ML
40 INJECTION, POWDER, LYOPHILIZED, FOR SOLUTION INTRAMUSCULAR; INTRAVENOUS DAILY
Status: DISCONTINUED | OUTPATIENT
Start: 2024-12-13 | End: 2024-12-12

## 2024-12-12 RX ORDER — POTASSIUM CHLORIDE 1500 MG/1
40 TABLET, EXTENDED RELEASE ORAL
Status: DISPENSED | OUTPATIENT
Start: 2024-12-12 | End: 2024-12-12

## 2024-12-12 RX ORDER — TOPIRAMATE 100 MG/1
200 TABLET, FILM COATED ORAL 2 TIMES DAILY
Status: CANCELLED | OUTPATIENT
Start: 2024-12-12

## 2024-12-12 RX ORDER — ALBUTEROL SULFATE 0.83 MG/ML
2.5 SOLUTION RESPIRATORY (INHALATION) EVERY 6 HOURS PRN
Status: DISCONTINUED | OUTPATIENT
Start: 2024-12-12 | End: 2024-12-17 | Stop reason: HOSPADM

## 2024-12-12 RX ADMIN — IOPAMIDOL 100 ML: 755 INJECTION, SOLUTION INTRAVENOUS at 12:55

## 2024-12-12 RX ADMIN — METHYLPREDNISOLONE SODIUM SUCCINATE 125 MG: 125 INJECTION, POWDER, FOR SOLUTION INTRAMUSCULAR; INTRAVENOUS at 11:38

## 2024-12-12 RX ADMIN — IPRATROPIUM BROMIDE AND ALBUTEROL SULFATE 3 ML: .5; 3 SOLUTION RESPIRATORY (INHALATION) at 12:11

## 2024-12-12 RX ADMIN — MAGNESIUM SULFATE 2 G: 2 INJECTION INTRAVENOUS at 20:37

## 2024-12-12 RX ADMIN — GUAIFENESIN 1200 MG: 600 TABLET, EXTENDED RELEASE ORAL at 20:36

## 2024-12-12 RX ADMIN — Medication 10 ML: at 16:01

## 2024-12-12 RX ADMIN — Medication 10 ML: at 20:44

## 2024-12-12 RX ADMIN — ACETAMINOPHEN 650 MG: 325 TABLET, FILM COATED ORAL at 21:33

## 2024-12-12 RX ADMIN — POTASSIUM CHLORIDE 40 MEQ: 1500 TABLET, EXTENDED RELEASE ORAL at 14:09

## 2024-12-12 RX ADMIN — METHYLPREDNISOLONE SODIUM SUCCINATE 40 MG: 40 INJECTION, POWDER, FOR SOLUTION INTRAMUSCULAR; INTRAVENOUS at 23:56

## 2024-12-12 RX ADMIN — DOXYCYCLINE 100 MG: 100 INJECTION, POWDER, LYOPHILIZED, FOR SOLUTION INTRAVENOUS at 15:50

## 2024-12-12 RX ADMIN — IPRATROPIUM BROMIDE AND ALBUTEROL SULFATE 3 ML: .5; 3 SOLUTION RESPIRATORY (INHALATION) at 16:53

## 2024-12-12 RX ADMIN — CEFTRIAXONE SODIUM 1000 MG: 1 INJECTION, POWDER, FOR SOLUTION INTRAMUSCULAR; INTRAVENOUS at 15:13

## 2024-12-12 RX ADMIN — MAGNESIUM SULFATE 2 G: 2 INJECTION INTRAVENOUS at 14:11

## 2024-12-12 RX ADMIN — POTASSIUM CHLORIDE 10 MEQ: 7.46 INJECTION, SOLUTION INTRAVENOUS at 15:59

## 2024-12-12 RX ADMIN — MAGNESIUM SULFATE 2 G: 2 INJECTION INTRAVENOUS at 18:45
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
## 2024-12-12 NOTE — Clinical Note
Level of Care: Telemetry [5]   Diagnosis: PNA (pneumonia) [134215]   Admitting Physician: CHEPE MCCAULEY [[id number]]   Certification: I Certify That Inpatient Hospital Services Are Medically Necessary For Greater Than 2 Midnights

## 2024-12-13 PROBLEM — J18.9 PNEUMONIA: Status: ACTIVE | Noted: 2024-12-13

## 2024-12-13 LAB
ALBUMIN SERPL-MCNC: 3.5 G/DL (ref 3.5–5.2)
ALBUMIN/GLOB SERPL: 1 G/DL
ALP SERPL-CCNC: 208 U/L (ref 39–117)
ALT SERPL W P-5'-P-CCNC: 64 U/L (ref 1–33)
ANION GAP SERPL CALCULATED.3IONS-SCNC: 14.8 MMOL/L (ref 5–15)
AST SERPL-CCNC: 45 U/L (ref 1–32)
BASOPHILS # BLD AUTO: 0.02 10*3/MM3 (ref 0–0.2)
BASOPHILS NFR BLD AUTO: 0.1 % (ref 0–1.5)
BILIRUB SERPL-MCNC: 0.6 MG/DL (ref 0–1.2)
BUN SERPL-MCNC: 13 MG/DL (ref 6–20)
BUN/CREAT SERPL: 23.2 (ref 7–25)
CALCIUM SPEC-SCNC: 9.2 MG/DL (ref 8.6–10.5)
CHLORIDE SERPL-SCNC: 99 MMOL/L (ref 98–107)
CO2 SERPL-SCNC: 21.2 MMOL/L (ref 22–29)
CREAT SERPL-MCNC: 0.56 MG/DL (ref 0.57–1)
DEPRECATED RDW RBC AUTO: 46.9 FL (ref 37–54)
EGFRCR SERPLBLD CKD-EPI 2021: 110 ML/MIN/1.73
EOSINOPHIL # BLD AUTO: 0 10*3/MM3 (ref 0–0.4)
EOSINOPHIL NFR BLD AUTO: 0 % (ref 0.3–6.2)
ERYTHROCYTE [DISTWIDTH] IN BLOOD BY AUTOMATED COUNT: 12.9 % (ref 12.3–15.4)
GLOBULIN UR ELPH-MCNC: 3.4 GM/DL
GLUCOSE SERPL-MCNC: 249 MG/DL (ref 65–99)
HCT VFR BLD AUTO: 37 % (ref 34–46.6)
HGB BLD-MCNC: 12.5 G/DL (ref 12–15.9)
IMM GRANULOCYTES # BLD AUTO: 0.31 10*3/MM3 (ref 0–0.05)
IMM GRANULOCYTES NFR BLD AUTO: 2.2 % (ref 0–0.5)
L PNEUMO1 AG UR QL IA: NEGATIVE
LYMPHOCYTES # BLD AUTO: 0.77 10*3/MM3 (ref 0.7–3.1)
LYMPHOCYTES NFR BLD AUTO: 5.4 % (ref 19.6–45.3)
MAGNESIUM SERPL-MCNC: 2.8 MG/DL (ref 1.6–2.6)
MCH RBC QN AUTO: 33.5 PG (ref 26.6–33)
MCHC RBC AUTO-ENTMCNC: 33.8 G/DL (ref 31.5–35.7)
MCV RBC AUTO: 99.2 FL (ref 79–97)
MONOCYTES # BLD AUTO: 0.32 10*3/MM3 (ref 0.1–0.9)
MONOCYTES NFR BLD AUTO: 2.3 % (ref 5–12)
MRSA DNA SPEC QL NAA+PROBE: NORMAL
NEUTROPHILS NFR BLD AUTO: 12.78 10*3/MM3 (ref 1.7–7)
NEUTROPHILS NFR BLD AUTO: 90 % (ref 42.7–76)
NRBC BLD AUTO-RTO: 0 /100 WBC (ref 0–0.2)
PHOSPHATE SERPL-MCNC: 2 MG/DL (ref 2.5–4.5)
PHOSPHATE SERPL-MCNC: 2.6 MG/DL (ref 2.5–4.5)
PLATELET # BLD AUTO: 228 10*3/MM3 (ref 140–450)
PMV BLD AUTO: 10.8 FL (ref 6–12)
POTASSIUM SERPL-SCNC: 3.3 MMOL/L (ref 3.5–5.2)
POTASSIUM SERPL-SCNC: 3.4 MMOL/L (ref 3.5–5.2)
POTASSIUM SERPL-SCNC: 4.6 MMOL/L (ref 3.5–5.2)
PROT SERPL-MCNC: 6.9 G/DL (ref 6–8.5)
RBC # BLD AUTO: 3.73 10*6/MM3 (ref 3.77–5.28)
S PNEUM AG SPEC QL LA: NEGATIVE
SODIUM SERPL-SCNC: 135 MMOL/L (ref 136–145)
WBC NRBC COR # BLD AUTO: 14.2 10*3/MM3 (ref 3.4–10.8)

## 2024-12-13 PROCEDURE — 84100 ASSAY OF PHOSPHORUS: CPT

## 2024-12-13 PROCEDURE — 84100 ASSAY OF PHOSPHORUS: CPT | Performed by: INTERNAL MEDICINE

## 2024-12-13 PROCEDURE — 94799 UNLISTED PULMONARY SVC/PX: CPT

## 2024-12-13 PROCEDURE — G0378 HOSPITAL OBSERVATION PER HR: HCPCS

## 2024-12-13 PROCEDURE — 83735 ASSAY OF MAGNESIUM: CPT | Performed by: INTERNAL MEDICINE

## 2024-12-13 PROCEDURE — 80053 COMPREHEN METABOLIC PANEL: CPT

## 2024-12-13 PROCEDURE — 25010000002 CEFTRIAXONE PER 250 MG: Performed by: INTERNAL MEDICINE

## 2024-12-13 PROCEDURE — 87641 MR-STAPH DNA AMP PROBE: CPT | Performed by: INTERNAL MEDICINE

## 2024-12-13 PROCEDURE — 94664 DEMO&/EVAL PT USE INHALER: CPT

## 2024-12-13 PROCEDURE — 84132 ASSAY OF SERUM POTASSIUM: CPT | Performed by: INTERNAL MEDICINE

## 2024-12-13 PROCEDURE — 85025 COMPLETE CBC W/AUTO DIFF WBC: CPT

## 2024-12-13 PROCEDURE — 25010000002 METHYLPREDNISOLONE PER 40 MG

## 2024-12-13 PROCEDURE — 94761 N-INVAS EAR/PLS OXIMETRY MLT: CPT

## 2024-12-13 RX ORDER — ROPINIROLE 1 MG/1
2 TABLET, FILM COATED ORAL NIGHTLY
Status: DISCONTINUED | OUTPATIENT
Start: 2024-12-13 | End: 2024-12-17 | Stop reason: HOSPADM

## 2024-12-13 RX ORDER — DOXYCYCLINE 100 MG/1
100 CAPSULE ORAL EVERY 12 HOURS SCHEDULED
Status: DISCONTINUED | OUTPATIENT
Start: 2024-12-13 | End: 2024-12-13

## 2024-12-13 RX ORDER — PANTOPRAZOLE SODIUM 40 MG/1
40 TABLET, DELAYED RELEASE ORAL NIGHTLY
Status: DISCONTINUED | OUTPATIENT
Start: 2024-12-13 | End: 2024-12-17 | Stop reason: HOSPADM

## 2024-12-13 RX ORDER — DICYCLOMINE HYDROCHLORIDE 10 MG/1
10 CAPSULE ORAL
Status: DISCONTINUED | OUTPATIENT
Start: 2024-12-13 | End: 2024-12-17 | Stop reason: HOSPADM

## 2024-12-13 RX ORDER — POTASSIUM CHLORIDE 1.5 G/1.58G
40 POWDER, FOR SOLUTION ORAL EVERY 4 HOURS
Status: COMPLETED | OUTPATIENT
Start: 2024-12-13 | End: 2024-12-13

## 2024-12-13 RX ORDER — SUMATRIPTAN 50 MG/1
50 TABLET, FILM COATED ORAL ONCE AS NEEDED
Status: DISCONTINUED | OUTPATIENT
Start: 2024-12-13 | End: 2024-12-17 | Stop reason: HOSPADM

## 2024-12-13 RX ORDER — CODEINE PHOSPHATE AND GUAIFENESIN 10; 100 MG/5ML; MG/5ML
10 SOLUTION ORAL EVERY 6 HOURS PRN
Status: DISCONTINUED | OUTPATIENT
Start: 2024-12-13 | End: 2024-12-17 | Stop reason: HOSPADM

## 2024-12-13 RX ORDER — SUCRALFATE 1 G/1
1 TABLET ORAL
Status: DISCONTINUED | OUTPATIENT
Start: 2024-12-13 | End: 2024-12-17 | Stop reason: HOSPADM

## 2024-12-13 RX ORDER — CETIRIZINE HYDROCHLORIDE 10 MG/1
10 TABLET ORAL NIGHTLY
Status: DISCONTINUED | OUTPATIENT
Start: 2024-12-13 | End: 2024-12-17 | Stop reason: HOSPADM

## 2024-12-13 RX ADMIN — POTASSIUM CHLORIDE 40 MEQ: 1.5 POWDER, FOR SOLUTION ORAL at 12:00

## 2024-12-13 RX ADMIN — CEFTRIAXONE 2000 MG: 2 INJECTION, POWDER, FOR SOLUTION INTRAMUSCULAR; INTRAVENOUS at 12:00

## 2024-12-13 RX ADMIN — METHYLPREDNISOLONE SODIUM SUCCINATE 40 MG: 40 INJECTION, POWDER, FOR SOLUTION INTRAMUSCULAR; INTRAVENOUS at 23:37

## 2024-12-13 RX ADMIN — IPRATROPIUM BROMIDE AND ALBUTEROL SULFATE 3 ML: .5; 3 SOLUTION RESPIRATORY (INHALATION) at 10:49

## 2024-12-13 RX ADMIN — METHYLPREDNISOLONE SODIUM SUCCINATE 40 MG: 40 INJECTION, POWDER, FOR SOLUTION INTRAMUSCULAR; INTRAVENOUS at 12:00

## 2024-12-13 RX ADMIN — Medication 10 ML: at 12:07

## 2024-12-13 RX ADMIN — IPRATROPIUM BROMIDE AND ALBUTEROL SULFATE 3 ML: .5; 3 SOLUTION RESPIRATORY (INHALATION) at 20:48

## 2024-12-13 RX ADMIN — DICYCLOMINE HYDROCHLORIDE 10 MG: 10 CAPSULE ORAL at 20:29

## 2024-12-13 RX ADMIN — DICYCLOMINE HYDROCHLORIDE 10 MG: 10 CAPSULE ORAL at 18:27

## 2024-12-13 RX ADMIN — CETIRIZINE HYDROCHLORIDE 10 MG: 10 TABLET, FILM COATED ORAL at 20:46

## 2024-12-13 RX ADMIN — Medication 2 PACKET: at 02:39

## 2024-12-13 RX ADMIN — IPRATROPIUM BROMIDE AND ALBUTEROL SULFATE 3 ML: .5; 3 SOLUTION RESPIRATORY (INHALATION) at 07:11

## 2024-12-13 RX ADMIN — GUAIFENESIN 1200 MG: 600 TABLET, EXTENDED RELEASE ORAL at 20:29

## 2024-12-13 RX ADMIN — CETIRIZINE HYDROCHLORIDE 10 MG: 10 TABLET, FILM COATED ORAL at 02:38

## 2024-12-13 RX ADMIN — Medication 10 ML: at 20:29

## 2024-12-13 RX ADMIN — PANTOPRAZOLE SODIUM 40 MG: 40 TABLET, DELAYED RELEASE ORAL at 20:46

## 2024-12-13 RX ADMIN — SUCRALFATE 1 G: 1 TABLET ORAL at 18:28

## 2024-12-13 RX ADMIN — GUAIFENESIN AND CODEINE PHOSPHATE 10 ML: 100; 10 SOLUTION ORAL at 13:26

## 2024-12-13 RX ADMIN — GUAIFENESIN 1200 MG: 600 TABLET, EXTENDED RELEASE ORAL at 09:59

## 2024-12-13 RX ADMIN — SUCRALFATE 1 G: 1 TABLET ORAL at 09:59

## 2024-12-13 RX ADMIN — ROPINIROLE HYDROCHLORIDE 2 MG: 1 TABLET, FILM COATED ORAL at 20:29

## 2024-12-13 RX ADMIN — DICYCLOMINE HYDROCHLORIDE 10 MG: 10 CAPSULE ORAL at 12:00

## 2024-12-13 RX ADMIN — DICYCLOMINE HYDROCHLORIDE 10 MG: 10 CAPSULE ORAL at 09:59

## 2024-12-13 RX ADMIN — SUCRALFATE 1 G: 1 TABLET ORAL at 12:00

## 2024-12-13 RX ADMIN — PANTOPRAZOLE SODIUM 40 MG: 40 TABLET, DELAYED RELEASE ORAL at 02:38

## 2024-12-13 RX ADMIN — POTASSIUM CHLORIDE 40 MEQ: 1.5 POWDER, FOR SOLUTION ORAL at 18:27
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
## 2024-12-13 NOTE — CASE MANAGEMENT/SOCIAL WORK
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 No    Primary Care Provided by self    Provides Primary Care For parent(s)    Caregiving Concerns Caregiver for her mother    Family Caregiver if Needed child(chino), adult    Quality of Family Relationships unable to assess    Able to Return to Prior Arrangements yes    Living Arrangement Comments Home with family       Resource/Environmental Concerns    Resource/Environmental Concerns none    Transportation Concerns none       Transportation Needs    In the past 12 months, has lack of transportation kept you from medical appointments or from getting medications? no    In the past 12 months, has lack of transportation kept you from meetings, work, or from getting things needed for daily living? No       Food Insecurity    Within the past 12 months, you worried that your food would run out before you got the money to buy more. Never true    Within the past 12 months, the food you bought just didn't last and you didn't have money to get more. Never true       Transition Planning    Patient/Family Anticipates Transition to home with family    Patient/Family Anticipated Services at Transition none    Transportation Anticipated family or friend will provide       Discharge Needs Assessment    Readmission Within the Last 30 Days no previous admission in last 30 days    Equipment Currently Used at Home none    Concerns to be Addressed discharge planning    Do you want help  finding or keeping work or a job? I do not need or want help    Do you want help with school or training? For example, starting or completing job training or getting a high school diploma, GED or equivalent No    Anticipated Changes Related to Illness none    Equipment Needed After Discharge none    Provided Post Acute Provider List? N/A    Provided Post Acute Provider Quality & Resource List? 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 Her mother is currently a patient in the ICU at . Denies financial concerns and has family that can provide dc transportation. Discussed dc plans, declines HH or SNF at this time. DC Barriers: IV aBX; Dyspnea                  Continued Care and Services - Admitted Since 12/12/2024    No active coordination exists for this encounter.          Demographic Summary       Row Name 12/13/24 0939       General Information    Admission Type observation    Arrived From home    Referral Source emergency department    Reason for Consult discharge planning    Preferred Language English       Contact Information    Permission Granted to Share Info With                    Functional Status       Row Name 12/13/24 0939       Functional Status    Usual Activity Tolerance moderate    Current Activity Tolerance fair       Physical Activity    On average, how many days per week do you engage in moderate to strenuous exercise (like a brisk walk)? 0 days    On average, how many minutes do you engage in exercise at this level? 0 min    Number of minutes of exercise per week 0       Assessment of Health Literacy    How often do you have someone help you read hospital materials?  Occasionally    How often do you have problems learning about your medical condition because of difficulty understanding written information? Occasionally    How often do you have a problem understanding what is told to you about your medical condition? Occasionally    How confident are you filling out medical forms by yourself? Quite a bit    Health Literacy Good       Functional Status, IADL    Medications independent    Meal Preparation independent    Housekeeping independent    Laundry independent    Shopping independent    If for any reason you need help with day-to-day activities such as bathing, preparing meals, shopping, managing finances, etc., do you get the help you need? I don't need any help       Mental Status    General Appearance WDL WDL       Mental Status Summary    Recent Changes in Mental Status/Cognitive Functioning no changes       Employment/    Employment Status disabled           Met with patient in room wearing PPE: mask  Maintained distance greater than six feet and spent less than 15 minutes in the room  Beth Montoya RN    Phone [phone]  Fax [phone]

## 2024-12-13 NOTE — NURSING NOTE
Verified as second RN and agree with Jill MILLIGAN skin assessment during patient admission to unit.

## 2024-12-13 NOTE — CASE MANAGEMENT/SOCIAL WORK
Continued Stay Note  KEISHA Jhaveri     Patient Name: Kayla Huber  MRN: [id number]  Today's Date: 12/12/2024    Admit Date: 12/12/2024    Plan: Needs Full CM Assessment   Discharge Plan       Row Name 12/12/24 2013       Plan    Plan Needs Full CM Assessment    Plan Comments Attempted to complete CM assess at bedside. Pt was NATALIE.             Beth Montoya RN    Phone [phone]  Fax [phone]

## 2024-12-13 NOTE — PROGRESS NOTES
[facility] MEDICINE SERVICE  DAILY PROGRESS NOTE    NAME: Kayla Huber  : 1970  MRN: [id number]      LOS: 0 days     PROVIDER OF SERVICE: Flo Casey MD    Chief Complaint: PNA (pneumonia)    Subjective:     Interval History:  History taken from: patient      Chief Complaint: shortness of breath, cough     History of Present Illness: Kayla Huber is a 53 y.o. female with a CMH of newly diagnosed hepatic steatosis, arthritis, anxiety, depression who presented to [facility] on 2024 with shortness of breath and cough.  Patient reported she was recently hospitalized for influenza A and rhinovirus.  Patient reported she initially was feeling better upon discharge but developed worsening cough and dyspnea over the past couple of days.  Patient reported her mother and brother are both currently hospitalized with pneumonia.  Patient reported her breathing is currently improved after receiving a breathing treatment with respiratory therapy. Patient O2 sat 98% on 3L via nasal cannula. Patient currently endorses nonproductive cough.  Patient denied shortness of breath, chest pain, fever, chills, nausea, vomiting, or any other acute issue.     In the ED: Pertinent labs include K space 2.6, Mg 1.4, phos 227, AST 61, ALT 81, procal 0.92, WBC 16.45, D-dimer 0.93. Resp panel PCR negative. CXR shows left lower lobe airspace disease. CTA chest negative for pulmonary embolism. UA obtained and likely contaminated due to presence of SEC. Patient received ceftriaxone 1 g IV, doxycycline 100 mg IV.  Magnesium and potassium repletion initiated. Hospitalist team to admit at this time for further management.     24 c/o cough headache. Tired, fatigued, did not sleep well last night     Review of Systems  Constitutional:  Negative for chills and fever.   Respiratory:  Positive for cough. Negative for chest tightness and shortness of breath.    Cardiovascular:  Negative for chest pain.    Gastrointestinal:  Negative for nausea and vomiting.   Genitourinary:  Negative for dysuria and hematuria.   All other systems reviewed and are negative.  Objective:     Vital Signs  Temp:  [97.7 °F (36.5 °C)-98.3 °F (36.8 °C)] 98.3 °F (36.8 °C)  Heart Rate:  [] 100  Resp:  [10-21] 17  BP: (111-144)/(65-88) 137/65  Flow (L/min) (Oxygen Therapy):  [3] 3   Body mass index is 27.31 kg/m².    Physical Exam   Constitutional:       Appearance: She is overweight.   HENT:      Head: Normocephalic and atraumatic.      Nose: Nose normal.      Mouth/Throat:      Mouth: Mucous membranes are moist.      Pharynx: Oropharynx is clear.   Eyes:      Extraocular Movements: Extraocular movements intact.      Conjunctiva/sclera: Conjunctivae normal.      Pupils: Pupils are equal, round, and reactive to light.   Cardiovascular:      Rate and Rhythm: Normal rate and regular rhythm.      Pulses: Normal pulses.      Heart sounds: Normal heart sounds.   Pulmonary:      Effort: Pulmonary effort is normal.      Breath sounds: Rhonchi present.   Abdominal:      General: Bowel sounds are normal.      Palpations: Abdomen is soft.   Musculoskeletal:         General: Normal range of motion.      Cervical back: Neck supple.   Skin:     General: Skin is warm and dry.   Neurological:      General: No focal deficit present.      Mental Status: She is alert and oriented to person, place, and time.   Psychiatric:         Mood and Affect: Mood normal.         Behavior: Behavior normal.         Thought Content: Thought content normal.         Judgment: Judgment normal.            Diagnostic Data    Results from last 7 days   Lab Units 12/13/24  0028   WBC 10*3/mm3 14.20*   HEMOGLOBIN g/dL 12.5   HEMATOCRIT % 37.0   PLATELETS 10*3/mm3 228   GLUCOSE mg/dL 249*   CREATININE mg/dL 0.56*   BUN mg/dL 13   SODIUM mmol/L 135*   POTASSIUM mmol/L 3.4*   AST (SGOT) U/L 45*   ALT (SGPT) U/L 64*   ALK PHOS U/L 208*   BILIRUBIN mg/dL 0.6   ANION GAP mmol/L 14.8        CT Angiogram Chest Pulmonary Embolism    Result Date: 12/12/2024  Impression: Negative exam for pulmonary embolism. Left lung infiltrates are compatible with pneumonia. Electronically Signed: Radha Soria MD  12/12/2024 1:02 PM EST  Workstation ID: [id number]    XR Chest 1 View    Result Date: 12/12/2024  Impression: Left lower lobe airspace disease. Correlate clinically for pneumonia. Electronically Signed: Josefa White MD  12/12/2024 12:31 PM EST  Workstation ID: [id number]       I reviewed the patient's new clinical results.    Assessment/Plan:     Active and Resolved Problems  Active Hospital Problems    Diagnosis  POA    **PNA (pneumonia) [J18.9]  Yes    Pneumonia [J18.9]  Yes      Resolved Hospital Problems   No resolved problems to display.     Pneumonia/chronic smoker,   Elevated D-dimer  - CXR: Left lower lobe airspace disease  - CT chest for PE protocol   - WBC 16.45  - Procal 0.92  - Resp panel PCR negative  - Abx: ceftriaxone and doxycycline given in ED, will continue  - Steroids: IV solumedrol 40 mg BID  - Start guaifenesin 1200 mg BID  - Titrate O2 to keep O2 sat 90-95%  - Start duonebs Q6H RT scheduled, albuterol Q4H PRN  - Encourage IS usage every 4 hours while awake   - Continue supportive care     Hypokalemia  Hypomagnesemia  - K 2.6 on admission; baseline 3.9 - 4.6  - Mg 1.4 on admission; no historical value noted  - Replace K and Mg per protocol  - Follow BMP     Elevated liver enzymes  Hepataic steatosis, newly diagnosed 12/4/24  - AST 61, ALT 81; baseline AST 72 -379, ALT 37 - 105  - Continue sucralfate  - Continue PPI  - Follow AM CMP  - Follows with GI    Snoring at night:likely sleep apnea- needs overnight sleep test    VTE Prophylaxis:  Mechanical VTE prophylaxis orders are present.       Disposition Planning:     Barriers to Discharge:PNA  Anticipated Date of Discharge: 12/15  Place of Discharge: home      Time: 35 minutes     Code Status and Medical Interventions: CPR (Attempt to  Resuscitate); Full Support   Ordered at: 12/12/24 1535     Level Of Support Discussed With:    Patient     Code Status (Patient has no pulse and is not breathing):    CPR (Attempt to Resuscitate)     Medical Interventions (Patient has pulse or is breathing):    Full Support       Signature: Electronically signed by Flo Casey MD, 12/13/24, 08:35 EST.  [facility] Hospitalist Team

## 2024-12-14 LAB
BASOPHILS # BLD AUTO: 0.01 10*3/MM3 (ref 0–0.2)
BASOPHILS NFR BLD AUTO: 0.1 % (ref 0–1.5)
DEPRECATED RDW RBC AUTO: 51.2 FL (ref 37–54)
EOSINOPHIL # BLD AUTO: 0 10*3/MM3 (ref 0–0.4)
EOSINOPHIL NFR BLD AUTO: 0 % (ref 0.3–6.2)
ERYTHROCYTE [DISTWIDTH] IN BLOOD BY AUTOMATED COUNT: 13.5 % (ref 12.3–15.4)
HCT VFR BLD AUTO: 37.4 % (ref 34–46.6)
HGB BLD-MCNC: 11.7 G/DL (ref 12–15.9)
IMM GRANULOCYTES # BLD AUTO: 0.32 10*3/MM3 (ref 0–0.05)
IMM GRANULOCYTES NFR BLD AUTO: 2.8 % (ref 0–0.5)
LYMPHOCYTES # BLD AUTO: 0.81 10*3/MM3 (ref 0.7–3.1)
LYMPHOCYTES NFR BLD AUTO: 7.1 % (ref 19.6–45.3)
MCH RBC QN AUTO: 32.3 PG (ref 26.6–33)
MCHC RBC AUTO-ENTMCNC: 31.3 G/DL (ref 31.5–35.7)
MCV RBC AUTO: 103.3 FL (ref 79–97)
MONOCYTES # BLD AUTO: 0.39 10*3/MM3 (ref 0.1–0.9)
MONOCYTES NFR BLD AUTO: 3.4 % (ref 5–12)
NEUTROPHILS NFR BLD AUTO: 86.6 % (ref 42.7–76)
NEUTROPHILS NFR BLD AUTO: 9.81 10*3/MM3 (ref 1.7–7)
NRBC BLD AUTO-RTO: 0.2 /100 WBC (ref 0–0.2)
PHOSPHATE SERPL-MCNC: 2.7 MG/DL (ref 2.5–4.5)
PLATELET # BLD AUTO: 261 10*3/MM3 (ref 140–450)
PMV BLD AUTO: 10.5 FL (ref 6–12)
RBC # BLD AUTO: 3.62 10*6/MM3 (ref 3.77–5.28)
WBC NRBC COR # BLD AUTO: 11.34 10*3/MM3 (ref 3.4–10.8)

## 2024-12-14 PROCEDURE — 25010000002 CEFTRIAXONE PER 250 MG: Performed by: INTERNAL MEDICINE

## 2024-12-14 PROCEDURE — 85025 COMPLETE CBC W/AUTO DIFF WBC: CPT

## 2024-12-14 PROCEDURE — 94761 N-INVAS EAR/PLS OXIMETRY MLT: CPT

## 2024-12-14 PROCEDURE — 94799 UNLISTED PULMONARY SVC/PX: CPT

## 2024-12-14 PROCEDURE — 25010000002 METHYLPREDNISOLONE PER 40 MG

## 2024-12-14 PROCEDURE — G0378 HOSPITAL OBSERVATION PER HR: HCPCS

## 2024-12-14 PROCEDURE — 94664 DEMO&/EVAL PT USE INHALER: CPT

## 2024-12-14 PROCEDURE — 84100 ASSAY OF PHOSPHORUS: CPT

## 2024-12-14 RX ORDER — HYDROCODONE POLISTIREX AND CHLORPHENIRAMINE POLISTIREX 10; 8 MG/5ML; MG/5ML
5 SUSPENSION, EXTENDED RELEASE ORAL EVERY 12 HOURS PRN
Status: DISCONTINUED | OUTPATIENT
Start: 2024-12-14 | End: 2024-12-17 | Stop reason: HOSPADM

## 2024-12-14 RX ORDER — GUAIFENESIN 600 MG/1
600 TABLET, EXTENDED RELEASE ORAL EVERY 12 HOURS SCHEDULED
Status: DISCONTINUED | OUTPATIENT
Start: 2024-12-14 | End: 2024-12-17 | Stop reason: HOSPADM

## 2024-12-14 RX ADMIN — ROPINIROLE HYDROCHLORIDE 2 MG: 1 TABLET, FILM COATED ORAL at 20:34

## 2024-12-14 RX ADMIN — IPRATROPIUM BROMIDE AND ALBUTEROL SULFATE 3 ML: .5; 3 SOLUTION RESPIRATORY (INHALATION) at 19:34

## 2024-12-14 RX ADMIN — Medication 10 ML: at 20:36

## 2024-12-14 RX ADMIN — GUAIFENESIN AND CODEINE PHOSPHATE 10 ML: 100; 10 SOLUTION ORAL at 00:10

## 2024-12-14 RX ADMIN — ACETAMINOPHEN 650 MG: 325 TABLET, FILM COATED ORAL at 01:03

## 2024-12-14 RX ADMIN — METHYLPREDNISOLONE SODIUM SUCCINATE 40 MG: 40 INJECTION, POWDER, FOR SOLUTION INTRAMUSCULAR; INTRAVENOUS at 23:21

## 2024-12-14 RX ADMIN — DICYCLOMINE HYDROCHLORIDE 10 MG: 10 CAPSULE ORAL at 08:00

## 2024-12-14 RX ADMIN — IPRATROPIUM BROMIDE AND ALBUTEROL SULFATE 3 ML: .5; 3 SOLUTION RESPIRATORY (INHALATION) at 11:54

## 2024-12-14 RX ADMIN — ACETAMINOPHEN 650 MG: 325 TABLET, FILM COATED ORAL at 04:40

## 2024-12-14 RX ADMIN — IPRATROPIUM BROMIDE AND ALBUTEROL SULFATE 3 ML: .5; 3 SOLUTION RESPIRATORY (INHALATION) at 08:45

## 2024-12-14 RX ADMIN — GUAIFENESIN 600 MG: 600 TABLET, EXTENDED RELEASE ORAL at 20:35

## 2024-12-14 RX ADMIN — GUAIFENESIN AND CODEINE PHOSPHATE 10 ML: 100; 10 SOLUTION ORAL at 09:52

## 2024-12-14 RX ADMIN — METHYLPREDNISOLONE SODIUM SUCCINATE 40 MG: 40 INJECTION, POWDER, FOR SOLUTION INTRAMUSCULAR; INTRAVENOUS at 10:52

## 2024-12-14 RX ADMIN — IPRATROPIUM BROMIDE AND ALBUTEROL SULFATE 3 ML: .5; 3 SOLUTION RESPIRATORY (INHALATION) at 16:00

## 2024-12-14 RX ADMIN — DICYCLOMINE HYDROCHLORIDE 10 MG: 10 CAPSULE ORAL at 20:34

## 2024-12-14 RX ADMIN — HYDROCODONE POLISTIREX AND CHLORPHENIRAMINE POLISTIREX 5 ML: 10; 8 SUSPENSION, EXTENDED RELEASE ORAL at 14:37

## 2024-12-14 RX ADMIN — CETIRIZINE HYDROCHLORIDE 10 MG: 10 TABLET, FILM COATED ORAL at 20:34

## 2024-12-14 RX ADMIN — Medication 10 ML: at 08:00

## 2024-12-14 RX ADMIN — PANTOPRAZOLE SODIUM 40 MG: 40 TABLET, DELAYED RELEASE ORAL at 20:34

## 2024-12-14 RX ADMIN — CEFTRIAXONE 2000 MG: 2 INJECTION, POWDER, FOR SOLUTION INTRAMUSCULAR; INTRAVENOUS at 08:00

## 2024-12-14 RX ADMIN — GUAIFENESIN 600 MG: 600 TABLET, EXTENDED RELEASE ORAL at 08:00

## 2024-12-14 RX ADMIN — DICYCLOMINE HYDROCHLORIDE 10 MG: 10 CAPSULE ORAL at 10:52

## 2024-12-14 NOTE — PROGRESS NOTES
[facility] MEDICINE SERVICE  DAILY PROGRESS NOTE    NAME: Kayla Huber  : 1970  MRN: [id number]      LOS: 0 days     PROVIDER OF SERVICE: Ursula Cerda MD    Chief Complaint: PNA (pneumonia)    Subjective:     Interval History:  History taken from: patient    No new complaint        Review of Systems:   Review of Systems   All other systems reviewed and are negative.      Objective:     Vital Signs  Temp:  [97.7 °F (36.5 °C)-98.8 °F (37.1 °C)] 97.8 °F (36.6 °C)  Heart Rate:  [84-98] 87  Resp:  [14-23] 14  BP: (142-157)/(75-98) 147/98  Flow (L/min) (Oxygen Therapy):  [3] 3   Body mass index is 27.31 kg/m².    Physical Exam  Physical Exam  Constitutional:       Appearance: Normal appearance.   HENT:      Head: Normocephalic and atraumatic.      Nose: Nose normal.      Mouth/Throat:      Mouth: Mucous membranes are moist.   Eyes:      Extraocular Movements: Extraocular movements intact.      Pupils: Pupils are equal, round, and reactive to light.   Cardiovascular:      Rate and Rhythm: Normal rate and regular rhythm.   Pulmonary:      Effort: Pulmonary effort is normal.      Breath sounds: Normal breath sounds.   Abdominal:      General: Abdomen is flat. Bowel sounds are normal.      Palpations: Abdomen is soft.   Musculoskeletal:         General: Normal range of motion.      Cervical back: Normal range of motion and neck supple.   Skin:     General: Skin is warm and dry.      Capillary Refill: Capillary refill takes 2 to 3 seconds.   Neurological:      General: No focal deficit present.      Mental Status: She is alert and oriented to person, place, and time.   Psychiatric:         Mood and Affect: Mood normal.         Behavior: Behavior normal.         Thought Content: Thought content normal.         Judgment: Judgment normal.         Current Medications:  Scheduled Meds:cefTRIAXone, 2,000 mg, Intravenous, Q24H  cetirizine, 10 mg, Oral, Nightly  dicyclomine, 10 mg, Oral, 4x Daily AC & at  Bedtime  guaiFENesin, 600 mg, Oral, Q12H  ipratropium-albuterol, 3 mL, Nebulization, 4x Daily - RT  methylPREDNISolone sodium succinate, 40 mg, Intravenous, Q12H  pantoprazole, 40 mg, Oral, Nightly  rOPINIRole, 2 mg, Oral, Nightly  sodium chloride, 10 mL, Intravenous, Q12H  sucralfate, 1 g, Oral, TID With Meals      Continuous Infusions:   PRN Meds:.  acetaminophen **OR** acetaminophen **OR** acetaminophen    albuterol    amitriptyline    senna-docusate sodium **AND** polyethylene glycol **AND** bisacodyl **AND** bisacodyl    Calcium Replacement - Follow Nurse / BPA Driven Protocol    guaiFENesin-codeine    Hydrocod Jesus-Chlorphe Jesus ER    Magnesium Standard Dose Replacement - Follow Nurse / BPA Driven Protocol    melatonin    nitroglycerin    ondansetron ODT **OR** ondansetron    Phosphorus Replacement - Follow Nurse / BPA Driven Protocol    Potassium Replacement - Follow Nurse / BPA Driven Protocol    sodium chloride    SUMAtriptan       Diagnostic Data    Results from last 7 days   Lab Units 12/14/24  0548 12/13/24  2035 12/13/24  0904 12/13/24  0028   WBC 10*3/mm3 11.34*  --   --  14.20*   HEMOGLOBIN g/dL 11.7*  --   --  12.5   HEMATOCRIT % 37.4  --   --  37.0   PLATELETS 10*3/mm3 261  --   --  228   GLUCOSE mg/dL  --   --   --  249*   CREATININE mg/dL  --   --   --  0.56*   BUN mg/dL  --   --   --  13   SODIUM mmol/L  --   --   --  135*   POTASSIUM mmol/L  --  4.6   < > 3.4*   AST (SGOT) U/L  --   --   --  45*   ALT (SGPT) U/L  --   --   --  64*   ALK PHOS U/L  --   --   --  208*   BILIRUBIN mg/dL  --   --   --  0.6   ANION GAP mmol/L  --   --   --  14.8    < > = values in this interval not displayed.       No radiology results for the last day      I reviewed the patient's new clinical results.    Assessment/Plan:     Active and Resolved Problems  Active Hospital Problems    Diagnosis  POA    **PNA (pneumonia) [J18.9]  Yes    Pneumonia [J18.9]  Yes      Resolved Hospital Problems   No resolved problems to  display.       Pneumonia/chronic smoker,   Elevated D-dimer  - CXR: Left lower lobe airspace disease  - CT chest for PE protocol   - WBC 16.45  - Procal 0.92  - Resp panel PCR negative  - Abx: ceftriaxone and doxycycline given in ED, will continue  - Steroids: IV solumedrol 40 mg BID  - Start guaifenesin 1200 mg BID  - Titrate O2 to keep O2 sat 90-95%  - Start duonebs Q6H RT scheduled, albuterol Q4H PRN  - Encourage IS usage every 4 hours while awake   - Continue supportive care     Hypokalemia  Hypomagnesemia  - K 2.6 on admission; baseline 3.9 - 4.6  - Mg 1.4 on admission; no historical value noted  - Replace K and Mg per protocol  - Follow BMP     Elevated liver enzymes  Hepataic steatosis, newly diagnosed 12/4/24  - AST 61, ALT 81; baseline AST 72 -379, ALT 37 - 105  - Continue sucralfate  - Continue PPI  - Follow AM CMP  - Follows with GI     Snoring at night:likely sleep apnea- needs overnight sleep test     VTE Prophylaxis:  Mechanical VTE prophylaxis orders are present.        Disposition Planning:      Barriers to Discharge: Pending clinical improvement  Anticipated Date of Discharge: 12/16/24  Place of Discharge: home     Code Status and Medical Interventions: CPR (Attempt to Resuscitate); Full Support   Ordered at: 12/12/24 1535     Level Of Support Discussed With:    Patient     Code Status (Patient has no pulse and is not breathing):    CPR (Attempt to Resuscitate)     Medical Interventions (Patient has pulse or is breathing):    Full Support       Signature: Electronically signed by Ursula Cerda MD, 12/14/24, 18:56 EST.  [facility] Hospitalist Team

## 2024-12-14 NOTE — PLAN OF CARE
Goal Outcome Evaluation:  Plan of Care Reviewed With: patient           Outcome Evaluation: IV antibiotics and steriods, PRN cough medicine. Vitals WNL. Remains on room air. Patient does continue to c/o shortness of breath with exertion. Breathing treatments on board. Droplet percautions. Will continue to monitor.

## 2024-12-15 LAB
ANION GAP SERPL CALCULATED.3IONS-SCNC: 13.3 MMOL/L (ref 5–15)
BASOPHILS # BLD AUTO: 0.03 10*3/MM3 (ref 0–0.2)
BASOPHILS NFR BLD AUTO: 0.3 % (ref 0–1.5)
BUN SERPL-MCNC: 14 MG/DL (ref 6–20)
BUN/CREAT SERPL: 25 (ref 7–25)
CALCIUM SPEC-SCNC: 9.6 MG/DL (ref 8.6–10.5)
CHLORIDE SERPL-SCNC: 100 MMOL/L (ref 98–107)
CO2 SERPL-SCNC: 22.7 MMOL/L (ref 22–29)
CREAT SERPL-MCNC: 0.56 MG/DL (ref 0.57–1)
DEPRECATED RDW RBC AUTO: 50 FL (ref 37–54)
EGFRCR SERPLBLD CKD-EPI 2021: 110 ML/MIN/1.73
EOSINOPHIL # BLD AUTO: 0 10*3/MM3 (ref 0–0.4)
EOSINOPHIL NFR BLD AUTO: 0 % (ref 0.3–6.2)
ERYTHROCYTE [DISTWIDTH] IN BLOOD BY AUTOMATED COUNT: 13.2 % (ref 12.3–15.4)
GLUCOSE SERPL-MCNC: 198 MG/DL (ref 65–99)
HCT VFR BLD AUTO: 37.2 % (ref 34–46.6)
HGB BLD-MCNC: 11.9 G/DL (ref 12–15.9)
IMM GRANULOCYTES # BLD AUTO: 0.84 10*3/MM3 (ref 0–0.05)
IMM GRANULOCYTES NFR BLD AUTO: 7.2 % (ref 0–0.5)
LYMPHOCYTES # BLD AUTO: 0.99 10*3/MM3 (ref 0.7–3.1)
LYMPHOCYTES NFR BLD AUTO: 8.5 % (ref 19.6–45.3)
MCH RBC QN AUTO: 32.9 PG (ref 26.6–33)
MCHC RBC AUTO-ENTMCNC: 32 G/DL (ref 31.5–35.7)
MCV RBC AUTO: 102.8 FL (ref 79–97)
MONOCYTES # BLD AUTO: 0.61 10*3/MM3 (ref 0.1–0.9)
MONOCYTES NFR BLD AUTO: 5.3 % (ref 5–12)
NEUTROPHILS NFR BLD AUTO: 78.7 % (ref 42.7–76)
NEUTROPHILS NFR BLD AUTO: 9.13 10*3/MM3 (ref 1.7–7)
NRBC BLD AUTO-RTO: 0.3 /100 WBC (ref 0–0.2)
PHOSPHATE SERPL-MCNC: 3.5 MG/DL (ref 2.5–4.5)
PLATELET # BLD AUTO: 269 10*3/MM3 (ref 140–450)
PMV BLD AUTO: 10.5 FL (ref 6–12)
POTASSIUM SERPL-SCNC: 4.2 MMOL/L (ref 3.5–5.2)
RBC # BLD AUTO: 3.62 10*6/MM3 (ref 3.77–5.28)
SODIUM SERPL-SCNC: 136 MMOL/L (ref 136–145)
WBC NRBC COR # BLD AUTO: 11.6 10*3/MM3 (ref 3.4–10.8)

## 2024-12-15 PROCEDURE — G0378 HOSPITAL OBSERVATION PER HR: HCPCS

## 2024-12-15 PROCEDURE — 80048 BASIC METABOLIC PNL TOTAL CA: CPT | Performed by: INTERNAL MEDICINE

## 2024-12-15 PROCEDURE — 94799 UNLISTED PULMONARY SVC/PX: CPT

## 2024-12-15 PROCEDURE — 94761 N-INVAS EAR/PLS OXIMETRY MLT: CPT

## 2024-12-15 PROCEDURE — 25010000002 CEFTRIAXONE PER 250 MG: Performed by: INTERNAL MEDICINE

## 2024-12-15 PROCEDURE — 84100 ASSAY OF PHOSPHORUS: CPT

## 2024-12-15 PROCEDURE — 25010000002 METHYLPREDNISOLONE PER 40 MG

## 2024-12-15 PROCEDURE — 85025 COMPLETE CBC W/AUTO DIFF WBC: CPT

## 2024-12-15 PROCEDURE — 94664 DEMO&/EVAL PT USE INHALER: CPT

## 2024-12-15 RX ADMIN — DICYCLOMINE HYDROCHLORIDE 10 MG: 10 CAPSULE ORAL at 09:03

## 2024-12-15 RX ADMIN — METHYLPREDNISOLONE SODIUM SUCCINATE 40 MG: 40 INJECTION, POWDER, FOR SOLUTION INTRAMUSCULAR; INTRAVENOUS at 12:14

## 2024-12-15 RX ADMIN — IPRATROPIUM BROMIDE AND ALBUTEROL SULFATE 3 ML: .5; 3 SOLUTION RESPIRATORY (INHALATION) at 20:00

## 2024-12-15 RX ADMIN — IPRATROPIUM BROMIDE AND ALBUTEROL SULFATE 3 ML: .5; 3 SOLUTION RESPIRATORY (INHALATION) at 08:21

## 2024-12-15 RX ADMIN — SUCRALFATE 1 G: 1 TABLET ORAL at 18:21

## 2024-12-15 RX ADMIN — Medication 10 ML: at 20:26

## 2024-12-15 RX ADMIN — DICYCLOMINE HYDROCHLORIDE 10 MG: 10 CAPSULE ORAL at 18:21

## 2024-12-15 RX ADMIN — CETIRIZINE HYDROCHLORIDE 10 MG: 10 TABLET, FILM COATED ORAL at 20:28

## 2024-12-15 RX ADMIN — HYDROCODONE POLISTIREX AND CHLORPHENIRAMINE POLISTIREX 5 ML: 10; 8 SUSPENSION, EXTENDED RELEASE ORAL at 19:29

## 2024-12-15 RX ADMIN — PANTOPRAZOLE SODIUM 40 MG: 40 TABLET, DELAYED RELEASE ORAL at 20:24

## 2024-12-15 RX ADMIN — SUCRALFATE 1 G: 1 TABLET ORAL at 12:14

## 2024-12-15 RX ADMIN — IPRATROPIUM BROMIDE AND ALBUTEROL SULFATE 3 ML: .5; 3 SOLUTION RESPIRATORY (INHALATION) at 14:57

## 2024-12-15 RX ADMIN — SUCRALFATE 1 G: 1 TABLET ORAL at 09:04

## 2024-12-15 RX ADMIN — CEFTRIAXONE 2000 MG: 2 INJECTION, POWDER, FOR SOLUTION INTRAMUSCULAR; INTRAVENOUS at 09:03

## 2024-12-15 RX ADMIN — DICYCLOMINE HYDROCHLORIDE 10 MG: 10 CAPSULE ORAL at 20:24

## 2024-12-15 RX ADMIN — GUAIFENESIN 600 MG: 600 TABLET, EXTENDED RELEASE ORAL at 20:24

## 2024-12-15 RX ADMIN — GUAIFENESIN 600 MG: 600 TABLET, EXTENDED RELEASE ORAL at 09:04

## 2024-12-15 RX ADMIN — DICYCLOMINE HYDROCHLORIDE 10 MG: 10 CAPSULE ORAL at 12:14

## 2024-12-15 RX ADMIN — METHYLPREDNISOLONE SODIUM SUCCINATE 40 MG: 40 INJECTION, POWDER, FOR SOLUTION INTRAMUSCULAR; INTRAVENOUS at 23:07

## 2024-12-15 RX ADMIN — CEFTRIAXONE 2000 MG: 2 INJECTION, POWDER, FOR SOLUTION INTRAMUSCULAR; INTRAVENOUS at 18:20

## 2024-12-15 RX ADMIN — IPRATROPIUM BROMIDE AND ALBUTEROL SULFATE 3 ML: .5; 3 SOLUTION RESPIRATORY (INHALATION) at 11:35

## 2024-12-15 RX ADMIN — GUAIFENESIN AND CODEINE PHOSPHATE 10 ML: 100; 10 SOLUTION ORAL at 00:27

## 2024-12-15 RX ADMIN — GUAIFENESIN AND CODEINE PHOSPHATE 10 ML: 100; 10 SOLUTION ORAL at 14:22

## 2024-12-15 RX ADMIN — ROPINIROLE HYDROCHLORIDE 2 MG: 1 TABLET, FILM COATED ORAL at 20:23

## 2024-12-15 RX ADMIN — HYDROCODONE POLISTIREX AND CHLORPHENIRAMINE POLISTIREX 5 ML: 10; 8 SUSPENSION, EXTENDED RELEASE ORAL at 05:23

## 2024-12-15 NOTE — PROGRESS NOTES
[facility] [address] [facility] MEDICINE SERVICE  DAILY PROGRESS NOTE    NAME: Kayla Huber  : 1970  MRN: [id number]      LOS: 0 days     PROVIDER OF SERVICE: Ursula Cerda MD    Chief Complaint: PNA (pneumonia)    Subjective:     Interval History:  History taken from: patient    No new complaint        Review of Systems:   Review of Systems   All other systems reviewed and are negative.      Objective:     Vital Signs  Temp:  [97.7 °F (36.5 °C)-98.7 °F (37.1 °C)] 98.7 °F (37.1 °C)  Heart Rate:  [] 92  Resp:  [12-22] 19  BP: (124-175)/() 124/75   Body mass index is 27.31 kg/m².    Physical Exam  Physical Exam  Constitutional:       Appearance: Normal appearance.   HENT:      Head: Normocephalic and atraumatic.      Nose: Nose normal.      Mouth/Throat:      Mouth: Mucous membranes are moist.   Eyes:      Extraocular Movements: Extraocular movements intact.      Pupils: Pupils are equal, round, and reactive to light.   Cardiovascular:      Rate and Rhythm: Normal rate and regular rhythm.   Pulmonary:      Effort: Pulmonary effort is normal.      Breath sounds: Normal breath sounds.   Abdominal:      General: Abdomen is flat. Bowel sounds are normal.      Palpations: Abdomen is soft.   Musculoskeletal:         General: Normal range of motion.      Cervical back: Normal range of motion and neck supple.   Skin:     General: Skin is warm and dry.      Capillary Refill: Capillary refill takes 2 to 3 seconds.   Neurological:      General: No focal deficit present.      Mental Status: She is alert and oriented to person, place, and time.   Psychiatric:         Mood and Affect: Mood normal.         Behavior: Behavior normal.         Thought Content: Thought content normal.         Judgment: Judgment normal.         Current Medications:  Scheduled Meds:cetirizine, 10 mg, Oral, Nightly  dicyclomine, 10 mg, Oral, 4x Daily AC & at Bedtime  guaiFENesin, 600 mg, Oral, Q12H  ipratropium-albuterol, 3 mL,  Nebulization, 4x Daily - RT  methylPREDNISolone sodium succinate, 40 mg, Intravenous, Q12H  pantoprazole, 40 mg, Oral, Nightly  rOPINIRole, 2 mg, Oral, Nightly  sodium chloride, 10 mL, Intravenous, Q12H  sucralfate, 1 g, Oral, TID With Meals      Continuous Infusions:   PRN Meds:.  acetaminophen **OR** acetaminophen **OR** acetaminophen    albuterol    amitriptyline    senna-docusate sodium **AND** polyethylene glycol **AND** bisacodyl **AND** bisacodyl    Calcium Replacement - Follow Nurse / BPA Driven Protocol    guaiFENesin-codeine    Hydrocod Jesus-Chlorphe Jesus ER    Magnesium Standard Dose Replacement - Follow Nurse / BPA Driven Protocol    melatonin    nitroglycerin    ondansetron ODT **OR** ondansetron    Phosphorus Replacement - Follow Nurse / BPA Driven Protocol    Potassium Replacement - Follow Nurse / BPA Driven Protocol    sodium chloride    SUMAtriptan       Diagnostic Data    Results from last 7 days   Lab Units 12/15/24  0441 12/13/24  0904 12/13/24  0028   WBC 10*3/mm3 11.60*   < > 14.20*   HEMOGLOBIN g/dL 11.9*   < > 12.5   HEMATOCRIT % 37.2   < > 37.0   PLATELETS 10*3/mm3 269   < > 228   GLUCOSE mg/dL 198*  --  249*   CREATININE mg/dL 0.56*  --  0.56*   BUN mg/dL 14  --  13   SODIUM mmol/L 136  --  135*   POTASSIUM mmol/L 4.2   < > 3.4*   AST (SGOT) U/L  --   --  45*   ALT (SGPT) U/L  --   --  64*   ALK PHOS U/L  --   --  208*   BILIRUBIN mg/dL  --   --  0.6   ANION GAP mmol/L 13.3  --  14.8    < > = values in this interval not displayed.       No radiology results for the last day      I reviewed the patient's new clinical results.    Assessment/Plan:     Active and Resolved Problems  Active Hospital Problems    Diagnosis  POA    **PNA (pneumonia) [J18.9]  Yes    Pneumonia [J18.9]  Yes      Resolved Hospital Problems   No resolved problems to display.       Pneumonia/chronic smoker,   Elevated D-dimer  - CXR: Left lower lobe airspace disease  - CT chest for PE protocol   -Continue IV ceftriaxone  follow-up on cultures.  Urine Legionella antigen negative.     Hypokalemia  -Resolved          VTE Prophylaxis:  Mechanical VTE prophylaxis orders are present.        Disposition Planning:      Barriers to Discharge: Pending clinical improvement  Anticipated Date of Discharge: 12/16/24  Place of Discharge: home     Code Status and Medical Interventions: CPR (Attempt to Resuscitate); Full Support   Ordered at: 12/12/24 3545     Level Of Support Discussed With:    Patient     Code Status (Patient has no pulse and is not breathing):    CPR (Attempt to Resuscitate)     Medical Interventions (Patient has pulse or is breathing):    Full Support       Signature: Electronically signed by Ursula Cerda MD, 12/15/24, 14:57 EST.  [facility] Hospitalist Team

## 2024-12-15 NOTE — PLAN OF CARE
Problem: Adult Inpatient Plan of Care  Goal: Plan of Care Review  Outcome: Progressing  Goal: Patient-Specific Goal (Individualized)  Outcome: Progressing  Goal: Absence of Hospital-Acquired Illness or Injury  Outcome: Progressing  Intervention: Identify and Manage Fall Risk  Recent Flowsheet Documentation  Taken 12/15/2024 0057 by Carrie Montoya LPN  Safety Promotion/Fall Prevention:   assistive device/personal items within reach   clutter free environment maintained   room organization consistent   safety round/check completed   nonskid shoes/slippers when out of bed  Taken 12/14/2024 2200 by Carrie Montoya LPN  Safety Promotion/Fall Prevention:   assistive device/personal items within reach   clutter free environment maintained   nonskid shoes/slippers when out of bed   room organization consistent   safety round/check completed  Intervention: Prevent Skin Injury  Recent Flowsheet Documentation  Taken 12/15/2024 0057 by Carrie Montoya LPN  Body Position: position changed independently  Taken 12/14/2024 2000 by Carrie Montoya LPN  Skin Protection:   incontinence pads utilized   transparent dressing maintained  Taken 12/14/2024 1610 by Carrie Montoya LPN  Body Position: position changed independently  Intervention: Prevent and Manage VTE (Venous Thromboembolism) Risk  Recent Flowsheet Documentation  Taken 12/15/2024 0057 by Carrie Montoya LPN  VTE Prevention/Management: SCDs (sequential compression devices) off  Taken 12/14/2024 2000 by Carrie Montoya LPN  VTE Prevention/Management: SCDs (sequential compression devices) off  Intervention: Prevent Infection  Recent Flowsheet Documentation  Taken 12/15/2024 0057 by Carrie Montoya LPN  Infection Prevention:   environmental surveillance performed   equipment surfaces disinfected   hand hygiene promoted   personal protective equipment utilized   rest/sleep promoted   single patient room provided  Taken 12/14/2024 2200 by Carrie Montoya LPN  Infection Prevention:   equipment surfaces  disinfected   environmental surveillance performed   personal protective equipment utilized   hand hygiene promoted   rest/sleep promoted   single patient room provided  Goal: Optimal Comfort and Wellbeing  Outcome: Progressing  Intervention: Monitor Pain and Promote Comfort  Recent Flowsheet Documentation  Taken 12/14/2024 2000 by Carrie Montoya LPN  Pain Management Interventions: care clustered  Intervention: Provide Person-Centered Care  Recent Flowsheet Documentation  Taken 12/15/2024 0057 by Carrie Montoya LPN  Trust Relationship/Rapport:   care explained   choices provided   thoughts/feelings acknowledged  Taken 12/14/2024 2000 by Carrie Montoya LPN  Trust Relationship/Rapport:   care explained   choices provided  Goal: Readiness for Transition of Care  Outcome: Progressing     Problem: Sepsis/Septic Shock  Goal: Optimal Coping  Outcome: Progressing  Intervention: Support Patient and Family Response  Recent Flowsheet Documentation  Taken 12/14/2024 2000 by Carrie Montoya LPN  Family/Support System Care:   support provided   self-care encouraged  Goal: Absence of Bleeding  Outcome: Progressing  Goal: Blood Glucose Level Within Target Range  Outcome: Progressing  Goal: Absence of Infection Signs and Symptoms  Outcome: Progressing  Intervention: Initiate Sepsis Management  Recent Flowsheet Documentation  Taken 12/15/2024 0057 by Carrie Montoya LPN  Infection Prevention:   environmental surveillance performed   equipment surfaces disinfected   hand hygiene promoted   personal protective equipment utilized   rest/sleep promoted   single patient room provided  Isolation Precautions: precautions maintained  Taken 12/14/2024 2200 by Carrie Montoya LPN  Infection Prevention:   equipment surfaces disinfected   environmental surveillance performed   personal protective equipment utilized   hand hygiene promoted   rest/sleep promoted   single patient room provided  Isolation Precautions:   precautions maintained    droplet  Intervention: Promote Recovery  Recent Flowsheet Documentation  Taken 12/14/2024 2000 by Carrie Montoya LPN  Sleep/Rest Enhancement: awakenings minimized  Taken 12/14/2024 1610 by Carrie Montoya LPN  Activity Management: up ad ruslan  Goal: Optimal Nutrition Delivery  Outcome: Progressing     Problem: Fall Injury Risk  Goal: Absence of Fall and Fall-Related Injury  Outcome: Progressing  Intervention: Identify and Manage Contributors  Recent Flowsheet Documentation  Taken 12/15/2024 0057 by Carrie Montoya LPN  Medication Review/Management: medications reviewed  Taken 12/14/2024 1610 by Carrie Montoya LPN  Medication Review/Management: medications reviewed  Intervention: Promote Injury-Free Environment  Recent Flowsheet Documentation  Taken 12/15/2024 0057 by Carrie Montoya LPN  Safety Promotion/Fall Prevention:   assistive device/personal items within reach   clutter free environment maintained   room organization consistent   safety round/check completed   nonskid shoes/slippers when out of bed  Taken 12/14/2024 2200 by Carrie Montoya LPN  Safety Promotion/Fall Prevention:   assistive device/personal items within reach   clutter free environment maintained   nonskid shoes/slippers when out of bed   room organization consistent   safety round/check completed   Goal Outcome Evaluation:

## 2024-12-16 LAB
ANION GAP SERPL CALCULATED.3IONS-SCNC: 12.3 MMOL/L (ref 5–15)
BUN SERPL-MCNC: 12 MG/DL (ref 6–20)
BUN/CREAT SERPL: 21.4 (ref 7–25)
CALCIUM SPEC-SCNC: 9.7 MG/DL (ref 8.6–10.5)
CHLORIDE SERPL-SCNC: 99 MMOL/L (ref 98–107)
CO2 SERPL-SCNC: 23.7 MMOL/L (ref 22–29)
CREAT SERPL-MCNC: 0.56 MG/DL (ref 0.57–1)
DEPRECATED RDW RBC AUTO: 48.8 FL (ref 37–54)
EGFRCR SERPLBLD CKD-EPI 2021: 110 ML/MIN/1.73
ERYTHROCYTE [DISTWIDTH] IN BLOOD BY AUTOMATED COUNT: 13.1 % (ref 12.3–15.4)
GLUCOSE SERPL-MCNC: 198 MG/DL (ref 65–99)
HCT VFR BLD AUTO: 39 % (ref 34–46.6)
HGB BLD-MCNC: 12.8 G/DL (ref 12–15.9)
MCH RBC QN AUTO: 33.3 PG (ref 26.6–33)
MCHC RBC AUTO-ENTMCNC: 32.8 G/DL (ref 31.5–35.7)
MCV RBC AUTO: 101.6 FL (ref 79–97)
PLATELET # BLD AUTO: 299 10*3/MM3 (ref 140–450)
PMV BLD AUTO: 10.8 FL (ref 6–12)
POTASSIUM SERPL-SCNC: 4.3 MMOL/L (ref 3.5–5.2)
RBC # BLD AUTO: 3.84 10*6/MM3 (ref 3.77–5.28)
SODIUM SERPL-SCNC: 135 MMOL/L (ref 136–145)
WBC NRBC COR # BLD AUTO: 12.69 10*3/MM3 (ref 3.4–10.8)

## 2024-12-16 PROCEDURE — 80048 BASIC METABOLIC PNL TOTAL CA: CPT | Performed by: INTERNAL MEDICINE

## 2024-12-16 PROCEDURE — 25010000002 METHYLPREDNISOLONE PER 40 MG

## 2024-12-16 PROCEDURE — 94664 DEMO&/EVAL PT USE INHALER: CPT

## 2024-12-16 PROCEDURE — 94761 N-INVAS EAR/PLS OXIMETRY MLT: CPT

## 2024-12-16 PROCEDURE — 94799 UNLISTED PULMONARY SVC/PX: CPT

## 2024-12-16 PROCEDURE — 85027 COMPLETE CBC AUTOMATED: CPT | Performed by: INTERNAL MEDICINE

## 2024-12-16 PROCEDURE — 25010000002 CEFTRIAXONE PER 250 MG: Performed by: INTERNAL MEDICINE

## 2024-12-16 RX ADMIN — PANTOPRAZOLE SODIUM 40 MG: 40 TABLET, DELAYED RELEASE ORAL at 20:34

## 2024-12-16 RX ADMIN — SUCRALFATE 1 G: 1 TABLET ORAL at 07:43

## 2024-12-16 RX ADMIN — METHYLPREDNISOLONE SODIUM SUCCINATE 40 MG: 40 INJECTION, POWDER, FOR SOLUTION INTRAMUSCULAR; INTRAVENOUS at 10:30

## 2024-12-16 RX ADMIN — IPRATROPIUM BROMIDE AND ALBUTEROL SULFATE 3 ML: .5; 3 SOLUTION RESPIRATORY (INHALATION) at 10:49

## 2024-12-16 RX ADMIN — HYDROCODONE POLISTIREX AND CHLORPHENIRAMINE POLISTIREX 5 ML: 10; 8 SUSPENSION, EXTENDED RELEASE ORAL at 20:33

## 2024-12-16 RX ADMIN — IPRATROPIUM BROMIDE AND ALBUTEROL SULFATE 3 ML: .5; 3 SOLUTION RESPIRATORY (INHALATION) at 06:55

## 2024-12-16 RX ADMIN — CETIRIZINE HYDROCHLORIDE 10 MG: 10 TABLET, FILM COATED ORAL at 20:34

## 2024-12-16 RX ADMIN — Medication 10 ML: at 20:35

## 2024-12-16 RX ADMIN — GUAIFENESIN 600 MG: 600 TABLET, EXTENDED RELEASE ORAL at 20:34

## 2024-12-16 RX ADMIN — HYDROCODONE POLISTIREX AND CHLORPHENIRAMINE POLISTIREX 5 ML: 10; 8 SUSPENSION, EXTENDED RELEASE ORAL at 08:10

## 2024-12-16 RX ADMIN — GUAIFENESIN AND CODEINE PHOSPHATE 10 ML: 100; 10 SOLUTION ORAL at 20:32

## 2024-12-16 RX ADMIN — IPRATROPIUM BROMIDE AND ALBUTEROL SULFATE 3 ML: .5; 3 SOLUTION RESPIRATORY (INHALATION) at 15:33

## 2024-12-16 RX ADMIN — DICYCLOMINE HYDROCHLORIDE 10 MG: 10 CAPSULE ORAL at 17:04

## 2024-12-16 RX ADMIN — SUCRALFATE 1 G: 1 TABLET ORAL at 12:04

## 2024-12-16 RX ADMIN — ROPINIROLE HYDROCHLORIDE 2 MG: 1 TABLET, FILM COATED ORAL at 20:35

## 2024-12-16 RX ADMIN — DICYCLOMINE HYDROCHLORIDE 10 MG: 10 CAPSULE ORAL at 20:35

## 2024-12-16 RX ADMIN — SUCRALFATE 1 G: 1 TABLET ORAL at 18:10

## 2024-12-16 RX ADMIN — GUAIFENESIN 600 MG: 600 TABLET, EXTENDED RELEASE ORAL at 08:13

## 2024-12-16 RX ADMIN — DICYCLOMINE HYDROCHLORIDE 10 MG: 10 CAPSULE ORAL at 07:43

## 2024-12-16 RX ADMIN — DICYCLOMINE HYDROCHLORIDE 10 MG: 10 CAPSULE ORAL at 12:04

## 2024-12-16 RX ADMIN — METHYLPREDNISOLONE SODIUM SUCCINATE 40 MG: 40 INJECTION, POWDER, FOR SOLUTION INTRAMUSCULAR; INTRAVENOUS at 22:58

## 2024-12-16 RX ADMIN — CEFTRIAXONE 2000 MG: 2 INJECTION, POWDER, FOR SOLUTION INTRAMUSCULAR; INTRAVENOUS at 17:04

## 2024-12-16 RX ADMIN — Medication 10 ML: at 08:13

## 2024-12-16 RX ADMIN — GUAIFENESIN AND CODEINE PHOSPHATE 10 ML: 100; 10 SOLUTION ORAL at 10:30

## 2024-12-16 RX ADMIN — IPRATROPIUM BROMIDE AND ALBUTEROL SULFATE 3 ML: .5; 3 SOLUTION RESPIRATORY (INHALATION) at 20:05

## 2024-12-16 RX ADMIN — GUAIFENESIN AND CODEINE PHOSPHATE 10 ML: 100; 10 SOLUTION ORAL at 03:18

## 2024-12-16 NOTE — CASE MANAGEMENT/SOCIAL WORK
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

## 2024-12-16 NOTE — PROGRESS NOTES
[facility] MEDICINE SERVICE  DAILY PROGRESS NOTE    NAME: Kayla Huber  : 1970  MRN: [id number]      LOS: 0 days     PROVIDER OF SERVICE: Tawanda Gavin DO    Chief Complaint: PNA (pneumonia)    Subjective:     Interval History:  History taken from: patient    Patient seen and examined this morning.  Doing well but still having quite a bit of cough and some shortness of breath, does not feel back to baseline yet.  No fever or chills.  No chest pain.      Review of Systems:   Review of Systems   All other systems reviewed and are negative.      Objective:     Vital Signs  Temp:  [97.4 °F (36.3 °C)-98.7 °F (37.1 °C)] 98.3 °F (36.8 °C)  Heart Rate:  [] 93  Resp:  [12-20] 14  BP: (124-177)/(73-97) 141/90   Body mass index is 27.31 kg/m².    Physical Exam  General: Awake, alert, NAD  Eyes: PERRL, EOMI, conjunctivae are clear  Cardiovascular: Regular rate and rhythm, no murmurs  Respiratory: Mild rhonchi and wheezing bilaterally, no rales, unlabored breathing  Abdomen: Soft, nontender, positive bowel sounds, no guarding  Neurologic: A&O, CN grossly intact, moves all extremities spontaneously  Musculoskeletal: Normal range of motion, no other gross deformities  Skin: Warm, dry      Current Medications:  Scheduled Meds:cefTRIAXone, 2,000 mg, Intravenous, Q24H  cetirizine, 10 mg, Oral, Nightly  dicyclomine, 10 mg, Oral, 4x Daily AC & at Bedtime  guaiFENesin, 600 mg, Oral, Q12H  ipratropium-albuterol, 3 mL, Nebulization, 4x Daily - RT  methylPREDNISolone sodium succinate, 40 mg, Intravenous, Q12H  pantoprazole, 40 mg, Oral, Nightly  rOPINIRole, 2 mg, Oral, Nightly  sodium chloride, 10 mL, Intravenous, Q12H  sucralfate, 1 g, Oral, TID With Meals      Continuous Infusions:   PRN Meds:.  acetaminophen **OR** acetaminophen **OR** acetaminophen    albuterol    amitriptyline    senna-docusate sodium **AND** polyethylene glycol **AND** bisacodyl **AND** bisacodyl    Calcium Replacement - Follow Nurse /  BPA Driven Protocol    guaiFENesin-codeine    Hydrocod Jesus-Chlorphe Jesus ER    Magnesium Standard Dose Replacement - Follow Nurse / BPA Driven Protocol    melatonin    nitroglycerin    ondansetron ODT **OR** ondansetron    Phosphorus Replacement - Follow Nurse / BPA Driven Protocol    Potassium Replacement - Follow Nurse / BPA Driven Protocol    sodium chloride    SUMAtriptan       Diagnostic Data    Results from last 7 days   Lab Units 12/16/24  0257 12/13/24  0904 12/13/24  0028   WBC 10*3/mm3 12.69*   < > 14.20*   HEMOGLOBIN g/dL 12.8   < > 12.5   HEMATOCRIT % 39.0   < > 37.0   PLATELETS 10*3/mm3 299   < > 228   GLUCOSE mg/dL 198*   < > 249*   CREATININE mg/dL 0.56*   < > 0.56*   BUN mg/dL 12   < > 13   SODIUM mmol/L 135*   < > 135*   POTASSIUM mmol/L 4.3   < > 3.4*   AST (SGOT) U/L  --   --  45*   ALT (SGPT) U/L  --   --  64*   ALK PHOS U/L  --   --  208*   BILIRUBIN mg/dL  --   --  0.6   ANION GAP mmol/L 12.3   < > 14.8    < > = values in this interval not displayed.       No radiology results for the last day      I reviewed the patient's new clinical results.    Assessment/Plan:     Active and Resolved Problems  Active Hospital Problems    Diagnosis  POA    **PNA (pneumonia) [J18.9]  Yes    Pneumonia [J18.9]  Yes      Resolved Hospital Problems   No resolved problems to display.       Pneumonia/chronic smoker,   Elevated D-dimer  - CXR: Left lower lobe airspace disease  - CT chest for PE protocol, negative for PE  -Continue IV ceftriaxone follow-up on cultures.  Urine Legionella antigen negative.  -Continue antitussive  -Does not feel at baseline yet, high risk for readmission     Hypokalemia  -Resolved          VTE Prophylaxis:  Mechanical VTE prophylaxis orders are present.        Disposition Planning:      Barriers to Discharge: Pending clinical improvement  Anticipated Date of Discharge: 12/17/24  Place of Discharge: home     Code Status and Medical Interventions: CPR (Attempt to Resuscitate); Full  Support   Ordered at: 12/12/24 1535     Level Of Support Discussed With:    Patient     Code Status (Patient has no pulse and is not breathing):    CPR (Attempt to Resuscitate)     Medical Interventions (Patient has pulse or is breathing):    Full Support       Signature: Electronically signed by Tawanda Gavin DO, 12/16/24, 11:26 [facility]     Part of this note may be an electronic transcription/translation of spoken language to printed text using the Dragon Dictation System.

## 2024-12-17 ENCOUNTER — READMISSION MANAGEMENT (OUTPATIENT)
Dept: CALL CENTER | Facility: HOSPITAL | Age: 54
End: 2024-12-17
Payer: MEDICAID

## 2024-12-17 VITALS
HEART RATE: 97 BPM | RESPIRATION RATE: 18 BRPM | WEIGHT: 184.97 LBS | SYSTOLIC BLOOD PRESSURE: 121 MMHG | BODY MASS INDEX: 27.4 KG/M2 | DIASTOLIC BLOOD PRESSURE: 85 MMHG | TEMPERATURE: 97.8 F | OXYGEN SATURATION: 97 % | HEIGHT: 69 IN

## 2024-12-17 PROBLEM — J18.9 PNEUMONIA, UNSPECIFIED ORGANISM: Status: ACTIVE | Noted: 2024-12-17

## 2024-12-17 PROCEDURE — 94799 UNLISTED PULMONARY SVC/PX: CPT

## 2024-12-17 PROCEDURE — 25010000002 METHYLPREDNISOLONE PER 40 MG

## 2024-12-17 PROCEDURE — 25010000002 CEFTRIAXONE PER 250 MG: Performed by: INTERNAL MEDICINE

## 2024-12-17 PROCEDURE — 94761 N-INVAS EAR/PLS OXIMETRY MLT: CPT

## 2024-12-17 PROCEDURE — 94664 DEMO&/EVAL PT USE INHALER: CPT

## 2024-12-17 RX ORDER — PREDNISONE 10 MG/1
TABLET ORAL
Qty: 9 TABLET | Refills: 0 | Status: SHIPPED | OUTPATIENT
Start: 2024-12-17 | End: 2024-12-27

## 2024-12-17 RX ORDER — CODEINE PHOSPHATE AND GUAIFENESIN 10; 100 MG/5ML; MG/5ML
10 SOLUTION ORAL EVERY 6 HOURS PRN
Qty: 237 ML | Refills: 0 | Status: SHIPPED | OUTPATIENT
Start: 2024-12-17 | End: 2024-12-27

## 2024-12-17 RX ORDER — ALBUTEROL SULFATE 90 UG/1
2 INHALANT RESPIRATORY (INHALATION) EVERY 4 HOURS PRN
Qty: 18 G | Refills: 0 | Status: SHIPPED | OUTPATIENT
Start: 2024-12-17

## 2024-12-17 RX ADMIN — GUAIFENESIN 600 MG: 600 TABLET, EXTENDED RELEASE ORAL at 08:45

## 2024-12-17 RX ADMIN — SUCRALFATE 1 G: 1 TABLET ORAL at 08:45

## 2024-12-17 RX ADMIN — DICYCLOMINE HYDROCHLORIDE 10 MG: 10 CAPSULE ORAL at 11:01

## 2024-12-17 RX ADMIN — CEFTRIAXONE 2000 MG: 2 INJECTION, POWDER, FOR SOLUTION INTRAMUSCULAR; INTRAVENOUS at 11:01

## 2024-12-17 RX ADMIN — IPRATROPIUM BROMIDE AND ALBUTEROL SULFATE 3 ML: .5; 3 SOLUTION RESPIRATORY (INHALATION) at 07:08

## 2024-12-17 RX ADMIN — Medication 10 ML: at 08:46

## 2024-12-17 RX ADMIN — IPRATROPIUM BROMIDE AND ALBUTEROL SULFATE 3 ML: .5; 3 SOLUTION RESPIRATORY (INHALATION) at 11:13

## 2024-12-17 RX ADMIN — HYDROCODONE POLISTIREX AND CHLORPHENIRAMINE POLISTIREX 5 ML: 10; 8 SUSPENSION, EXTENDED RELEASE ORAL at 08:46

## 2024-12-17 RX ADMIN — SUCRALFATE 1 G: 1 TABLET ORAL at 11:00

## 2024-12-17 RX ADMIN — DICYCLOMINE HYDROCHLORIDE 10 MG: 10 CAPSULE ORAL at 08:45

## 2024-12-17 RX ADMIN — METHYLPREDNISOLONE SODIUM SUCCINATE 40 MG: 40 INJECTION, POWDER, FOR SOLUTION INTRAMUSCULAR; INTRAVENOUS at 11:00

## 2024-12-17 NOTE — DISCHARGE SUMMARY
Hospitalist Service   DISCHARGE SUMMARY    Patient Name: Kayla Huber  : 1970  MRN: [id number]    Date of Admission: 2024  Date of Discharge:  24    Primary Care Physician: Chantal Benitez DO      Presenting Problem:   Hypokalemia [E87.6]  Hypomagnesemia [E83.42]  Pneumonia [J18.9]  PNA (pneumonia) [J18.9]  Pneumonia of left lower lobe due to infectious organism [J18.9]  Dyspnea, unspecified type [R06.00]    Active and Resolved Hospital Problems:  Active Hospital Problems    Diagnosis POA    **PNA (pneumonia) [J18.9] Yes    Pneumonia, unspecified organism [J18.9] Yes    Pneumonia [J18.9] Yes      Resolved Hospital Problems   No resolved problems to display.         Hospital Course     Hospital Course:  Kayla Huber is a 53 y.o. female with a CMH of newly diagnosed hepatic steatosis, arthritis, anxiety, depression who presented to [facility] on 2024 with shortness of breath and cough. Patient reported she was recently hospitalized for influenza A and rhinovirus. Patient reported she initially was feeling better upon discharge but developed worsening cough and dyspnea over the past couple of days. Patient reported her mother and brother are both currently hospitalized with pneumonia. Patient reported her breathing is currently improved after receiving a breathing treatment with respiratory therapy. Patient O2 sat 98% on 3L via nasal cannula.  Elevated D-dimer noted however CTA chest negative for PE.  Atypicals negative.  Treated with IV ceftriaxone and finished course while here.  Also given some IV steroids and antitussives for the cough and wheezing which has improved.  Overall patient is doing well, remains on room air.  Remains afebrile and clinically improved.  Patient is medically stable to discharge home with follow-up with PCP as an outpatient.        DISCHARGE Follow Up Recommendations for labs and diagnostics:   Follow-up with PCP in 1 week    Day of  Discharge     Vital Signs:  Temp:  [97.8 °F (36.6 °C)-99.2 °F (37.3 °C)] 97.8 °F (36.6 °C)  Heart Rate:  [] 93  Resp:  [13-18] 17  BP: (121-154)/(84-97) 121/85    Physical Exam:  General: Awake, alert, NAD  Eyes: PERRL, EOMI, conjunctivae are clear  Cardiovascular: Regular rate and rhythm, no murmurs  Respiratory: Clear to auscultation bilaterally, no wheezing or rales, unlabored breathing  Abdomen: Soft, nontender, positive bowel sounds, no guarding  Neurologic: A&O, CN grossly intact, moves all extremities spontaneously  Musculoskeletal: Normal range of motion, no other gross deformities  Skin: Warm, dry        Pertinent  and/or Most Recent Results     LAB RESULTS:      Lab 12/16/24  0257 12/15/24  0441 12/14/24  0548 12/13/24  0028 12/12/24  1845 12/12/24  1121   WBC 12.69* 11.60* 11.34* 14.20*  --  16.45*   HEMOGLOBIN 12.8 11.9* 11.7* 12.5  --  13.5   HEMATOCRIT 39.0 37.2 37.4 37.0  --  39.4   PLATELETS 299 269 261 228  --  246   NEUTROS ABS  --  9.13* 9.81* 12.78*  --  13.66*   IMMATURE GRANS (ABS)  --  0.84* 0.32* 0.31*  --  0.15*   LYMPHS ABS  --  0.99 0.81 0.77  --  1.76   MONOS ABS  --  0.61 0.39 0.32  --  0.82   EOS ABS  --  0.00 0.00 0.00  --  0.03   .6* 102.8* 103.3* 99.2*  --  97.8*   PROCALCITONIN  --   --   --   --   --  0.92*   LACTATE  --   --   --   --  1.8  --    D DIMER QUANT  --   --   --   --   --  0.93*         Lab 12/16/24  0257 12/15/24  0441 12/14/24  0548 12/13/24  2035 12/13/24  0904 12/13/24  0028 12/12/24  1121   SODIUM 135* 136  --   --   --  135* 138   POTASSIUM 4.3 4.2  --  4.6 3.3* 3.4* 2.6*   CHLORIDE 99 100  --   --   --  99 99   CO2 23.7 22.7  --   --   --  21.2* 20.6*   ANION GAP 12.3 13.3  --   --   --  14.8 18.4*   BUN 12 14  --   --   --  13 11   CREATININE 0.56* 0.56*  --   --   --  0.56* 0.57   EGFR 110.0 110.0  --   --   --  110.0 109.5   GLUCOSE 198* 198*  --   --   --  249* 137*   CALCIUM 9.7 9.6  --   --   --  9.2 9.7   MAGNESIUM  --   --   --   --   --   2.8* 1.4*   PHOSPHORUS  --  3.5 2.7  --  2.6 2.0*  --          Lab 12/13/24  0028 12/12/24  1121   TOTAL PROTEIN 6.9 7.3   ALBUMIN 3.5 3.7   GLOBULIN 3.4 3.6   ALT (SGPT) 64* 81*   AST (SGOT) 45* 61*   BILIRUBIN 0.6 1.1   ALK PHOS 208* 227*         Lab 12/12/24  1354 12/12/24  1121   HSTROP T 7 7                 Brief Urine Lab Results  (Last result in the past 365 days)        Color   Clarity   Blood   Leuk Est   Nitrite   Protein   CREAT   Urine HCG        12/12/24 1152 Dark Yellow   Slightly Cloudy   Negative   Trace   Positive   30 mg/dL (1+)                 Microbiology Results (last 10 days)       Procedure Component Value - Date/Time    MRSA Screen, PCR (Inpatient) - Swab, Nares [[id number]]  (Normal) Collected: 12/13/24 1206    Lab Status: Final result Specimen: Swab from Nares Updated: 12/13/24 1422     MRSA PCR No MRSA Detected    Narrative:      The negative predictive value of this diagnostic test is high and should only be used to consider de-escalating anti-MRSA therapy. A positive result may indicate colonization with MRSA and must be correlated clinically.    Respiratory Panel PCR w/COVID-19(SARS-CoV-2) [facility] In-House, NP Swab in UTM/VTM, 2 HR TAT - Swab, Nasopharynx [[id number]]  (Normal) Collected: 12/12/24 1357    Lab Status: Final result Specimen: Swab from Nasopharynx Updated: 12/12/24 1450     ADENOVIRUS, PCR Not Detected     Coronavirus 229E Not Detected     Coronavirus HKU1 Not Detected     Coronavirus NL63 Not Detected     Coronavirus OC43 Not Detected     COVID19 Not Detected     Human Metapneumovirus Not Detected     Human Rhinovirus/Enterovirus Not Detected     Influenza A PCR Not Detected     Influenza B PCR Not Detected     Parainfluenza Virus 1 Not Detected     Parainfluenza Virus 2 Not Detected     Parainfluenza Virus 3 Not Detected     Parainfluenza Virus 4 Not Detected     RSV, PCR Not Detected     Bordetella pertussis pcr Not Detected     Bordetella parapertussis  PCR Not Detected     Chlamydophila pneumoniae PCR Not Detected     Mycoplasma pneumo by PCR Not Detected    Narrative:      In the setting of a positive respiratory panel with a viral infection PLUS a negative procalcitonin without other underlying concern for bacterial infection, consider observing off antibiotics or discontinuation of antibiotics and continue supportive care. If the respiratory panel is positive for atypical bacterial infection (Bordetella pertussis, Chlamydophila pneumoniae, or Mycoplasma pneumoniae), consider antibiotic de-escalation to target atypical bacterial infection.    Legionella Antigen, Urine - Urine, Urine, Clean Catch [[id number]]  (Normal) Collected: 12/12/24 1152    Lab Status: Final result Specimen: Urine, Clean Catch Updated: 12/13/24 1230     LEGIONELLA ANTIGEN, URINE Negative    S. Pneumo Ag Urine or CSF - Urine, Urine, Clean Catch [[id number]]  (Normal) Collected: 12/12/24 1152    Lab Status: Final result Specimen: Urine, Clean Catch Updated: 12/13/24 1231     Strep Pneumo Ag Negative            CT Angiogram Chest Pulmonary Embolism    Result Date: 12/12/2024  Impression: Impression: Negative exam for pulmonary embolism. Left lung infiltrates are compatible with pneumonia. Electronically Signed: Radha Soria MD  12/12/2024 1:02 PM EST  Workstation ID: [id number]    XR Chest 1 View    Result Date: 12/12/2024  Impression: Impression: Left lower lobe airspace disease. Correlate clinically for pneumonia. Electronically Signed: Josefa White MD  12/12/2024 12:31 PM EST  Workstation ID: [id number]    US Abdomen Limited    Result Date: 12/5/2024  Impression: 1.Increased echogenicity of the liver compatible with diffuse hepatocellular disease, hepatic steatosis. Please correlate with liver function test. 2.Gallbladder appears grossly unremarkable in appearance. Electronically Signed: Rhys Pedraza MD  12/5/2024 7:13 AM EST  Workstation ID: [id number]    XR Chest 1 View    Result Date:  12/4/2024  Impression: Impression: An acute pulmonary process is not apparent. Electronically Signed: Jack Gracia MD  12/4/2024 8:47 PM EST  Workstation ID: [id number]    CT Abdomen Pelvis With Contrast    Result Date: 12/4/2024  Impression: Impression: No acute findings in the abdomen or pelvis. Hepatic steatosis. Nonobstructing right nephrolithiasis. Electronically Signed: Arsenioangel Tarango  12/4/2024 4:47 PM EST  Workstation ID: [id number]                 Labs Pending at Discharge:      Procedures Performed           Consults:   Consults       Date and Time Order Name Status Description    12/12/2024  2:01 PM Hospitalist (on-call MD unless specified)      12/5/2024 11:48 AM Inpatient Cardiology Consult Completed               Discharge Details        Discharge Medications        New Medications        Instructions Start Date   guaiFENesin-codeine 100-10 MG/5ML solution/syrup  Commonly known as: ROMILAR-AC   10 mL, Oral, Every 6 Hours PRN      predniSONE 10 MG tablet  Commonly known as: DELTASONE   Take 2 tabs daily for 3 days, then take 1 tab daily for 3 days.             Changes to Medications        Instructions Start Date   albuterol sulfate  (90 Base) MCG/ACT inhaler  Commonly known as: PROVENTIL HFA;VENTOLIN HFA;PROAIR HFA  What changed: Another medication with the same name was added. Make sure you understand how and when to take each.   2 puffs, Every 6 Hours PRN      Ventolin  (90 Base) MCG/ACT inhaler  Generic drug: albuterol sulfate HFA  What changed: You were already taking a medication with the same name, and this prescription was added. Make sure you understand how and when to take each.   2 puffs, Inhalation, Every 4 Hours PRN             Continue These Medications        Instructions Start Date   amitriptyline 75 MG tablet  Commonly known as: ELAVIL   75 mg, Oral, Nightly      cetirizine 10 MG tablet  Commonly known as: zyrTEC   10 mg, Oral, Daily      dicyclomine 10 MG capsule  Commonly  known as: BENTYL   10 mg, Oral, 4 Times Daily Before Meals & Nightly      Fluticasone Propionate Diskus 50 MCG/ACT aerosol powder    1 puff, Inhalation, 2 Times Daily      ondansetron ODT 4 MG disintegrating tablet  Commonly known as: ZOFRAN-ODT   4 mg, Translingual, Every 8 Hours PRN      pantoprazole 40 MG EC tablet  Commonly known as: PROTONIX   40 mg, Daily      rizatriptan 10 MG tablet  Commonly known as: MAXALT   Take 1 tablet by mouth As Needed.      rOPINIRole 2 MG tablet  Commonly known as: REQUIP   Take 2 tablets by mouth Every Night.      sucralfate 1 g tablet  Commonly known as: Carafate   1 g, Oral, 3 Times Daily With Meals             Stop These Medications      DULoxetine 60 MG capsule  Commonly known as: CYMBALTA     fluticasone 50 MCG/ACT nasal spray  Commonly known as: FLONASE     Hydrocod Jesus-Chlorphe Jesus ER 10-8 MG/5ML ER suspension  Commonly known as: TUSSIONEX PENNKINETIC     methylPREDNISolone 4 MG dose pack  Commonly known as: MEDROL     topiramate 200 MG tablet  Commonly known as: TOPAMAX     varenicline 1 MG tablet  Commonly known as: CHANTIX              Allergies   Allergen Reactions    Amoxicillin Unknown - Low Severity    Penicillins Hives     CAN TAKE KEFLEX AND AMOXICILLIN         Discharge Disposition:  Home or Self Care    Diet:  Hospital:  Diet Order   Procedures    Diet: Regular/House; Fluid Consistency: Thin (IDDSI 0)         Discharge Activity:         CODE STATUS:  Code Status and Medical Interventions: CPR (Attempt to Resuscitate); Full Support   Ordered at: 12/12/24 8533     Level Of Support Discussed With:    Patient     Code Status (Patient has no pulse and is not breathing):    CPR (Attempt to Resuscitate)     Medical Interventions (Patient has pulse or is breathing):    Full Support         Future Appointments   Date Time Provider Department Center   12/19/2024  9:30 AM LAB MD [facility]  [facility]   12/19/2024  9:45 AM yRan Guzmán MD [facility]    7/10/2025 11:15 AM Chantal Benitez DO [facility]           Time spent on Discharge including face to face service:  40 minutes    Signature:    Electronically signed by Tawanda Gavin DO, 12/17/24, 9:47 AM EST.      Part of this note may be an electronic transcription/translation of spoken language to printed text using the Dragon Dictation System.

## 2024-12-18 ENCOUNTER — TRANSITIONAL CARE MANAGEMENT TELEPHONE ENCOUNTER (OUTPATIENT)
Dept: CALL CENTER | Facility: HOSPITAL | Age: 54
End: 2024-12-18
Payer: MEDICAID

## 2024-12-18 ENCOUNTER — TELEPHONE (OUTPATIENT)
Dept: CARDIOLOGY | Facility: CLINIC | Age: 54
End: 2024-12-18

## 2024-12-18 NOTE — OUTREACH NOTE
Prep Survey      Flowsheet Row Responses   [facility] facility patient discharged from? [facility]   Is LACE score < 7 ? No   Eligibility [facility]   Date of Admission 12/12/24   Date of Discharge 12/17/24   Discharge Disposition Home or Self Care   Discharge diagnosis PNA   Does the patient have one of the following disease processes/diagnoses(primary or secondary)? Pneumonia   Does the patient have Home health ordered? No   Is there a DME ordered? No   Prep survey completed? Yes            GAIL TRACY - Registered Nurse

## 2024-12-18 NOTE — OUTREACH NOTE
Call Center TCM Note      Flowsheet Row Responses   [facility] patient discharged from? [facility]   Does the patient have one of the following disease processes/diagnoses(primary or secondary)? Pneumonia   TCM attempt successful? Yes   Call start time 1635   Call end time 1644   Discharge diagnosis PNA   Meds reviewed with patient/caregiver? Yes   Is the patient having any side effects they believe may be caused by any medication additions or changes? No   Does the patient have all medications ordered at discharge? Yes   Is the patient taking all medications as directed (includes completed medication regime)? Yes   Comments Hospital f/u 12/27/24   Does the patient have an appointment with their PCP within 7-14 days of discharge? Yes   Has home health visited the patient within 72 hours of discharge? N/A   Psychosocial issues? No   Did the patient receive a copy of their discharge instructions? Yes   Nursing interventions Reviewed instructions with patient   What is the patient's perception of their health status since discharge? Same  [dtr was out picking up supplies for her and her mother who was also discharged.  Encouraged to montior for worsening resp type s/s.  Challenges with conversation due to connection issues. Dtr has been in contact with a CM,  unable to locate info in chart.]   Nursing Interventions Nurse provided patient education   If the patient is a current smoker, are they able to teach back resources for cessation? Not a smoker   Is the patient/caregiver able to teach back signs and symptoms of worsening condition: Fever/chills, Shortness of breath, Chest pain   TCM call completed? Yes   Call end time 1644            Jazmine Whaley RN    12/18/2024, 16:52 EST

## 2024-12-18 NOTE — OUTREACH NOTE
Call Center TCM Note      Flowsheet Row Responses   [facility] patient discharged from? [facility]   Does the patient have one of the following disease processes/diagnoses(primary or secondary)? Pneumonia   TCM attempt successful? No   Unsuccessful attempts Attempt 1            Jazmine Whaley RN    12/18/2024, 08:46 EST

## 2024-12-18 NOTE — TELEPHONE ENCOUNTER
Caller: Kayla Huber    Relationship to patient: Self    Best call back number: [phone]    New or established patient?  [] New  [x] Established    Date of discharge:12.05.2024    Facility discharged from: [facility]    Diagnosis/Symptoms: Chest pain     Length of stay (If applicable): 25 HOURS    Specialty Only: Did you see a [facility] health provider?    [x] Yes  [] No  If so, who? DR. RAMEY    Additional Details: PT NEEDS 2 WEEK HOSPITAL FOLLOW UP. HUB NOT GREENLIT TO SCHEDULE WITH DR. RAMEY. PLEASE CALL TO SCHEDULE

## 2024-12-19 NOTE — CASE MANAGEMENT/SOCIAL WORK
Case Management Discharge Note      Final Note: routine home    Selected Continued Care - Discharged on 12/17/2024 Admission date: 12/12/2024 - Discharge disposition: Home or Self Care     Transportation Services  Private: Car    Final Discharge Disposition Code: 01 - home or self-care

## 2024-12-27 ENCOUNTER — HOSPITAL ENCOUNTER (OUTPATIENT)
Dept: GENERAL RADIOLOGY | Facility: HOSPITAL | Age: 54
Discharge: HOME | End: 2024-12-27
Payer: MEDICAID

## 2024-12-27 ENCOUNTER — OFFICE VISIT (OUTPATIENT)
Dept: FAMILY MEDICINE CLINIC | Facility: CLINIC | Age: 54
End: 2024-12-27
Payer: MEDICAID

## 2024-12-27 VITALS
DIASTOLIC BLOOD PRESSURE: 60 MMHG | HEART RATE: 93 BPM | BODY MASS INDEX: 26.75 KG/M2 | TEMPERATURE: 97.8 F | OXYGEN SATURATION: 99 % | WEIGHT: 180.6 LBS | RESPIRATION RATE: 16 BRPM | HEIGHT: 69 IN | SYSTOLIC BLOOD PRESSURE: 104 MMHG

## 2024-12-27 DIAGNOSIS — Z09 HOSPITAL DISCHARGE FOLLOW-UP: Primary | ICD-10-CM

## 2024-12-27 DIAGNOSIS — J18.9 PNEUMONIA OF LEFT LOWER LOBE DUE TO INFECTIOUS ORGANISM: ICD-10-CM

## 2024-12-27 DIAGNOSIS — R89.9 ABNORMAL LABORATORY TEST RESULT: ICD-10-CM

## 2024-12-27 DIAGNOSIS — E66.3 OVERWEIGHT WITH BODY MASS INDEX (BMI) OF 26 TO 26.9 IN ADULT: ICD-10-CM

## 2024-12-27 PROBLEM — R07.9 CHEST PAIN: Status: RESOLVED | Noted: 2024-12-04 | Resolved: 2024-12-27

## 2024-12-27 PROCEDURE — 71046 X-RAY EXAM CHEST 2 VIEWS: CPT

## 2024-12-27 PROCEDURE — 99495 TRANSJ CARE MGMT MOD F2F 14D: CPT | Performed by: STUDENT IN AN ORGANIZED HEALTH CARE EDUCATION/TRAINING PROGRAM

## 2024-12-27 PROCEDURE — 1126F AMNT PAIN NOTED NONE PRSNT: CPT | Performed by: STUDENT IN AN ORGANIZED HEALTH CARE EDUCATION/TRAINING PROGRAM

## 2024-12-27 RX ORDER — METHYLPREDNISOLONE 4 MG/1
TABLET ORAL
Qty: 21 TABLET | Refills: 0 | Status: SHIPPED | OUTPATIENT
Start: 2024-12-27

## 2024-12-27 RX ORDER — BENZONATATE 100 MG/1
200 CAPSULE ORAL 3 TIMES DAILY PRN
Qty: 42 CAPSULE | Refills: 0 | Status: CANCELLED | OUTPATIENT
Start: 2024-12-27

## 2024-12-27 RX ORDER — ALBUTEROL SULFATE 0.63 MG/3ML
1 SOLUTION RESPIRATORY (INHALATION) EVERY 6 HOURS PRN
Qty: 300 ML | Refills: 1 | Status: SHIPPED | OUTPATIENT
Start: 2024-12-27

## 2024-12-27 NOTE — PROGRESS NOTES
Subjective   Kayla Huber is a 53 y.o. female.   Chief Complaint   Patient presents with    Hospital Follow Up Visit       History of Present Illness     Hospital follow up:  Kayla was seen at [facility] .   She was admitted on 12/12/2024  for Hypokalemia, Hypomagnesemia, pneumonia left lower lobe due to infectious organism, dyspnea.   She was discharged on 12/17/2024.   Course of Events  -Patient presented to the ER with shortness of breath and cough.  She was recent hospitalized for influenza A and rhinovirus.  Reported feeling better but then developed a worsening cough and dyspnea over the last few days  -Patient was 98% oxygen on 3 L of nasal cannula  -D-dimer elevated but CTA chest negative for PE  -Patient was treated with IV ceftriaxone while in the hospital and finished course while there.  She was also given IV steroids and antitussives for the cough and wheezing which improved  -Patient discharged with guaifenesin/codeine 100-10 mg per 5 mL to use every 6 hours as needed, prednisone 20 mg for 3 days then 10 mg for 3 days, albuterol inhaler  -She was asked to stop the fluticasone no spray, duloxetine 60 mg, topiramate 200 mg and Chantix    Workup  -CBC: White blood cell count initially 16.45 that improved to 12.69 at discharge.  -CMP: Potassium initially low at 2.6 but improved to normal by discharge.  LFTs were little elevated and did improve by discharge  -Troponin normal  -MRSA: Not detected  -Respiratory panel negative  -UA: Trace leukocyte esterase and positive nitrite and 30 protein  - Legionella urine: Negative   - strep pneumoniae urine: Negative  -CTA chest: Left lung infiltrates compatible with pneumonia but negative for PE  -Ultrasound of abdomen: Diffuse hepatocellular disease, hepatic steatosis    Today  - still coughing, using robitussin over the counter, tessalon perles don't help  - states albuterol inhaler doesn't help.  States breathing treatments via nebulizer helped  a lot  - states she sometimes wheezes.  States she still has bodyaches  -Does not feel much better than when she left the hospital        The following portions of the patient's history were reviewed and updated as appropriate: allergies, current medications, past family history, past medical history, past social history, past surgical history, and problem list.    Patient Active Problem List   Diagnosis    Intractable chronic migraine without aura and with status migrainosus    Insomnia    Anxiety and depression    DDD (degenerative disc disease), cervical    Chronic left shoulder pain    Vitamin D deficiency    Mixed hyperlipidemia    Overweight with body mass index (BMI) of 26 to 26.9 in adult    Tobacco use disorder       Current Outpatient Medications on File Prior to Visit   Medication Sig Dispense Refill    albuterol sulfate  (90 Base) MCG/ACT inhaler Inhale 2 puffs Every 4 (Four) Hours As Needed for Wheezing. 18 g 0    cetirizine (zyrTEC) 10 MG tablet Take 1 tablet by mouth Daily. 90 tablet 3    dicyclomine (BENTYL) 10 MG capsule TAKE 1 CAPSULE BY MOUTH 4 TIMES DAILY BEFORE MEAL(S) AND AT BEDTIME 120 capsule 0    ondansetron ODT (ZOFRAN-ODT) 4 MG disintegrating tablet Place 1 tablet on the tongue Every 8 (Eight) Hours As Needed for Nausea. 30 tablet 0    rizatriptan (MAXALT) 10 MG tablet Take 1 tablet by mouth As Needed.      sucralfate (Carafate) 1 g tablet Take 1 tablet by mouth 3 (Three) Times a Day With Meals for 30 days. 90 tablet 0    amitriptyline (ELAVIL) 75 MG tablet Take 1 tablet by mouth Every Night. (Patient not taking: Reported on 12/27/2024) 30 tablet 1    Fluticasone Propionate, Inhal, (Fluticasone Propionate Diskus) 50 MCG/ACT aerosol powder  INHALE 1 PUFF TWICE DAILY (Patient not taking: Reported on 12/27/2024) 60 each 0    rOPINIRole (REQUIP) 2 MG tablet Take 2 tablets by mouth Every Night. (Patient not taking: Reported on 12/27/2024)      [DISCONTINUED] albuterol sulfate  (90  "Base) MCG/ACT inhaler Inhale 2 puffs Every 6 (Six) Hours As Needed.      [DISCONTINUED] guaiFENesin-codeine (ROMILAR-AC) 100-10 MG/5ML solution/syrup Take 10 mL by mouth Every 6 (Six) Hours As Needed for Cough. 237 mL 0    [DISCONTINUED] pantoprazole (PROTONIX) 40 MG EC tablet Take 1 tablet by mouth Daily.      [DISCONTINUED] predniSONE (DELTASONE) 10 MG tablet Take 2 tabs daily for 3 days, then take 1 tab daily for 3 days. 9 tablet 0     No current facility-administered medications on file prior to visit.     Current outpatient and discharge medications have been reconciled for the patient.  Reviewed by: Chantal Benitez,       Allergies   Allergen Reactions    Amoxicillin Unknown - Low Severity    Penicillins Hives     CAN TAKE KEFLEX AND AMOXICILLIN         Objective   Visit Vitals  /60 (BP Location: Left arm, Patient Position: Sitting, Cuff Size: Adult)   Pulse 93   Temp 97.8 °F (36.6 °C) (Skin)   Resp 16   Ht 175.3 cm (69\")   Wt 81.9 kg (180 lb 9.6 oz)   LMP  (LMP Unknown)   SpO2 99%   BMI 26.67 kg/m²       Physical Exam  HENT:      Head: Normocephalic and atraumatic.      Mouth/Throat:      Comments: - Frequent coughing throughout appointment  Eyes:      Conjunctiva/sclera: Conjunctivae normal.   Cardiovascular:      Rate and Rhythm: Normal rate and regular rhythm.      Heart sounds: Normal heart sounds.   Pulmonary:      Effort: Pulmonary effort is normal.      Breath sounds: Normal breath sounds. No wheezing.   Neurological:      General: No focal deficit present.      Mental Status: She is alert and oriented to person, place, and time.   Psychiatric:         Mood and Affect: Mood normal.         Behavior: Behavior normal.           Diagnoses and all orders for this visit:    1. Hospital discharge follow-up (Primary)/ Pneumonia of left lower lobe due to infectious organism  -     XR Chest 2 View to check on status of pneumonia considering patient is still very symptomatic  -     methylPREDNISolone " (MEDROL) 4 MG dose pack; Take as directed on package instructions.  Dispense: 21 tablet; Refill: 0  -     albuterol (ACCUNEB) 0.63 MG/3ML nebulizer solution; Take 3 mL by nebulization Every 6 (Six) Hours As Needed for Wheezing.  Dispense: 300 mL; Refill: 1  -Gave patient nebulizer from office closet for home breathing treatments  -     CBC & Differential  -     Comprehensive metabolic panel    4. Overweight with body mass index (BMI) of 26 to 26.9 in adult           Follow Up  -7/10/2025 for annual physical exam  - 3 weeks to follow up on resolution of pneumonia symptoms    Expected course, medications, and adverse effects discussed as appropriate.  Call or return if worsening or persistent symptoms. Hand hygiene was performed before donning protective equipment and after removal when leaving the room.    This document is intended for medical expert use only. Reading of this document by patients and/or patient's family without participating medical staff guidance may result in misinterpretation and unintended morbidity. Any interpretation of such data is the responsibility of the patient and/or family member responsible for the patient in concert with their primary or specialist providers, not to be left for sources of online searches such as [employer], [employer] or similar queries. Relying on these approaches to knowledge may result in misinterpretation, misguided goals of care and even death should patients or family members try recommendations outside of the realm of professional medical care.

## 2024-12-28 LAB
ALBUMIN SERPL-MCNC: 4 G/DL (ref 3.8–4.9)
ALP SERPL-CCNC: 208 IU/L (ref 44–121)
ALT SERPL-CCNC: 81 IU/L (ref 0–32)
AST SERPL-CCNC: 75 IU/L (ref 0–40)
BASOPHILS # BLD AUTO: 0 X10E3/UL (ref 0–0.2)
BASOPHILS NFR BLD AUTO: 0 %
BILIRUB SERPL-MCNC: 0.8 MG/DL (ref 0–1.2)
BUN SERPL-MCNC: 11 MG/DL (ref 6–24)
BUN/CREAT SERPL: 20 (ref 9–23)
CALCIUM SERPL-MCNC: 10.1 MG/DL (ref 8.7–10.2)
CHLORIDE SERPL-SCNC: 101 MMOL/L (ref 96–106)
CO2 SERPL-SCNC: 23 MMOL/L (ref 20–29)
CREAT SERPL-MCNC: 0.56 MG/DL (ref 0.57–1)
EGFRCR SERPLBLD CKD-EPI 2021: 110 ML/MIN/1.73
EOSINOPHIL # BLD AUTO: 0.1 X10E3/UL (ref 0–0.4)
EOSINOPHIL NFR BLD AUTO: 1 %
ERYTHROCYTE [DISTWIDTH] IN BLOOD BY AUTOMATED COUNT: 12.6 % (ref 11.7–15.4)
GLOBULIN SER CALC-MCNC: 2.7 G/DL (ref 1.5–4.5)
GLUCOSE SERPL-MCNC: 117 MG/DL (ref 70–99)
HCT VFR BLD AUTO: 44 % (ref 34–46.6)
HGB BLD-MCNC: 14.3 G/DL (ref 11.1–15.9)
IMM GRANULOCYTES # BLD AUTO: 0.1 X10E3/UL (ref 0–0.1)
IMM GRANULOCYTES NFR BLD AUTO: 1 %
LYMPHOCYTES # BLD AUTO: 2.3 X10E3/UL (ref 0.7–3.1)
LYMPHOCYTES NFR BLD AUTO: 20 %
MCH RBC QN AUTO: 32.4 PG (ref 26.6–33)
MCHC RBC AUTO-ENTMCNC: 32.5 G/DL (ref 31.5–35.7)
MCV RBC AUTO: 100 FL (ref 79–97)
MONOCYTES # BLD AUTO: 0.7 X10E3/UL (ref 0.1–0.9)
MONOCYTES NFR BLD AUTO: 6 %
NEUTROPHILS # BLD AUTO: 8 X10E3/UL (ref 1.4–7)
NEUTROPHILS NFR BLD AUTO: 72 %
PLATELET # BLD AUTO: 284 X10E3/UL (ref 150–450)
POTASSIUM SERPL-SCNC: 4.5 MMOL/L (ref 3.5–5.2)
PROT SERPL-MCNC: 6.7 G/DL (ref 6–8.5)
RBC # BLD AUTO: 4.41 X10E6/UL (ref 3.77–5.28)
SODIUM SERPL-SCNC: 139 MMOL/L (ref 134–144)
WBC # BLD AUTO: 11.1 X10E3/UL (ref 3.4–10.8)

## 2025-01-08 ENCOUNTER — TELEPHONE (OUTPATIENT)
Dept: CARDIOLOGY | Facility: CLINIC | Age: 55
End: 2025-01-08

## 2025-01-08 NOTE — TELEPHONE ENCOUNTER
Caller: Kayla Huber    Relationship to patient: Self    Best call back number: [phone]    Patient is needing: PATIENT IS SCHEDULED 2 HOSPITAL FOLLOW UPS . [facility] ON 1.13.25 AND [facility] ON 1.20.25. PATIENT DOES NOT KNOW IF BOTH ARE NEEDED. SHE PREFERS TO COME IN ON 1.13.25

## 2025-01-10 ENCOUNTER — READMISSION MANAGEMENT (OUTPATIENT)
Dept: CALL CENTER | Facility: HOSPITAL | Age: 55
End: 2025-01-10
Payer: MEDICAID

## 2025-01-10 NOTE — OUTREACH NOTE
COPD/PN Week 3 Survey      Flowsheet Row Responses   [facility] patient discharged from? [facility]   Does the patient have one of the following disease processes/diagnoses(primary or secondary)? Pneumonia   Week 3 attempt successful? No   Unsuccessful attempts Attempt 1            Halie AVILEZ - Registered Nurse
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
## 2025-01-13 ENCOUNTER — OFFICE VISIT (OUTPATIENT)
Dept: ONCOLOGY | Facility: CLINIC | Age: 55
End: 2025-01-13
Payer: MEDICAID

## 2025-01-13 ENCOUNTER — TELEPHONE (OUTPATIENT)
Dept: ONCOLOGY | Facility: CLINIC | Age: 55
End: 2025-01-13

## 2025-01-13 ENCOUNTER — LAB (OUTPATIENT)
Dept: LAB | Facility: HOSPITAL | Age: 55
End: 2025-01-13
Payer: MEDICAID

## 2025-01-13 ENCOUNTER — OFFICE VISIT (OUTPATIENT)
Dept: CARDIOLOGY | Facility: CLINIC | Age: 55
End: 2025-01-13
Payer: MEDICAID

## 2025-01-13 VITALS
HEART RATE: 106 BPM | HEIGHT: 69 IN | TEMPERATURE: 98 F | RESPIRATION RATE: 16 BRPM | BODY MASS INDEX: 27.64 KG/M2 | OXYGEN SATURATION: 98 % | SYSTOLIC BLOOD PRESSURE: 150 MMHG | DIASTOLIC BLOOD PRESSURE: 89 MMHG | WEIGHT: 186.6 LBS

## 2025-01-13 VITALS
DIASTOLIC BLOOD PRESSURE: 82 MMHG | OXYGEN SATURATION: 97 % | HEIGHT: 69 IN | BODY MASS INDEX: 28.14 KG/M2 | WEIGHT: 190 LBS | HEART RATE: 109 BPM | SYSTOLIC BLOOD PRESSURE: 120 MMHG

## 2025-01-13 DIAGNOSIS — Z87.891 FORMER SMOKER: ICD-10-CM

## 2025-01-13 DIAGNOSIS — E78.2 MIXED HYPERLIPIDEMIA: ICD-10-CM

## 2025-01-13 DIAGNOSIS — G89.29 CHRONIC CHEST PAIN WITH HIGH RISK FOR CAD: Primary | ICD-10-CM

## 2025-01-13 DIAGNOSIS — R16.1 SPLENOMEGALY: Primary | ICD-10-CM

## 2025-01-13 DIAGNOSIS — Z12.11 ENCOUNTER FOR SCREENING FOR MALIGNANT NEOPLASM OF COLON: Primary | ICD-10-CM

## 2025-01-13 DIAGNOSIS — Z91.89 CHRONIC CHEST PAIN WITH HIGH RISK FOR CAD: Primary | ICD-10-CM

## 2025-01-13 DIAGNOSIS — R07.9 CHRONIC CHEST PAIN WITH HIGH RISK FOR CAD: Primary | ICD-10-CM

## 2025-01-13 DIAGNOSIS — F41.9 ANXIETY AND DEPRESSION: ICD-10-CM

## 2025-01-13 DIAGNOSIS — R79.89 ELEVATED FERRITIN: ICD-10-CM

## 2025-01-13 DIAGNOSIS — F32.A ANXIETY AND DEPRESSION: ICD-10-CM

## 2025-01-13 LAB
HOLD SPECIMEN: NORMAL
HOLD SPECIMEN: NORMAL

## 2025-01-13 PROCEDURE — 1159F MED LIST DOCD IN RCRD: CPT | Performed by: INTERNAL MEDICINE

## 2025-01-13 PROCEDURE — 99213 OFFICE O/P EST LOW 20 MIN: CPT | Performed by: INTERNAL MEDICINE

## 2025-01-13 PROCEDURE — 1160F RVW MEDS BY RX/DR IN RCRD: CPT | Performed by: INTERNAL MEDICINE

## 2025-01-13 PROCEDURE — 1126F AMNT PAIN NOTED NONE PRSNT: CPT | Performed by: INTERNAL MEDICINE

## 2025-01-13 RX ORDER — METOPROLOL TARTRATE 25 MG/1
100 TABLET, FILM COATED ORAL ONCE
OUTPATIENT
Start: 2025-01-13

## 2025-01-13 RX ORDER — METOPROLOL TARTRATE 25 MG/1
200 TABLET, FILM COATED ORAL ONCE
OUTPATIENT
Start: 2025-01-13 | End: 2025-01-13

## 2025-01-13 RX ORDER — METOPROLOL TARTRATE 25 MG/1
50 TABLET, FILM COATED ORAL ONCE
OUTPATIENT
Start: 2025-01-13

## 2025-01-13 RX ORDER — METOPROLOL TARTRATE 25 MG/1
25 TABLET, FILM COATED ORAL ONCE
OUTPATIENT
Start: 2025-01-13

## 2025-01-13 RX ORDER — SODIUM CHLORIDE 0.9 % (FLUSH) 0.9 %
10 SYRINGE (ML) INJECTION EVERY 12 HOURS SCHEDULED
OUTPATIENT
Start: 2025-01-13

## 2025-01-13 RX ORDER — METOPROLOL TARTRATE 25 MG/1
150 TABLET, FILM COATED ORAL ONCE
OUTPATIENT
Start: 2025-01-13

## 2025-01-13 RX ORDER — SODIUM CHLORIDE 0.9 % (FLUSH) 0.9 %
10 SYRINGE (ML) INJECTION AS NEEDED
OUTPATIENT
Start: 2025-01-13

## 2025-01-13 RX ORDER — NITROGLYCERIN 0.4 MG/1
0.8 TABLET SUBLINGUAL
OUTPATIENT
Start: 2025-01-13

## 2025-01-13 RX ORDER — METOPROLOL TARTRATE 100 MG/1
TABLET ORAL
Qty: 2 TABLET | Refills: 0 | Status: SHIPPED | OUTPATIENT
Start: 2025-01-13

## 2025-01-13 RX ORDER — SODIUM CHLORIDE 9 MG/ML
40 INJECTION, SOLUTION INTRAVENOUS AS NEEDED
OUTPATIENT
Start: 2025-01-13

## 2025-01-13 RX ORDER — NITROGLYCERIN 0.4 MG/1
0.4 TABLET SUBLINGUAL
OUTPATIENT
Start: 2025-01-13 | End: 2025-01-13

## 2025-01-13 RX ORDER — METOPROLOL TARTRATE 1 MG/ML
5 INJECTION, SOLUTION INTRAVENOUS
OUTPATIENT
Start: 2025-01-13

## 2025-01-13 RX ORDER — METOPROLOL TARTRATE 50 MG
50 TABLET ORAL
OUTPATIENT
Start: 2025-01-13

## 2025-01-13 NOTE — TELEPHONE ENCOUNTER
Hub staff attempted to follow warm transfer process and was unsuccessful     Caller: Kayla Huber    Relationship to patient: Self    Best call back number: [phone]    Patient is needing: WANTING TO COME IN EARLIER, IN THE AREA JUST FINISHED UP WITH HER PREVIOUS APPOINTMENT

## 2025-01-13 NOTE — PATIENT INSTRUCTIONS
Thank you for following up with cardiology today.  We encourage you to continue taking her medications and engaging healthy lifestyle habits including physical activity and diet with low sodium levels.  If you have any questions please let us know.
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
## 2025-01-16 ENCOUNTER — READMISSION MANAGEMENT (OUTPATIENT)
Dept: CALL CENTER | Facility: HOSPITAL | Age: 55
End: 2025-01-16
Payer: MEDICAID

## 2025-01-16 NOTE — OUTREACH NOTE
COPD/PN Week 3 Survey      Flowsheet Row Responses   [facility] patient discharged from? [facility]   Does the patient have one of the following disease processes/diagnoses(primary or secondary)? Pneumonia   Week 3 attempt successful? No   Unsuccessful attempts Attempt 2            RACHEL ECHAVARRIA - Registered Nurse

## 2025-01-23 ENCOUNTER — OFFICE VISIT (OUTPATIENT)
Dept: FAMILY MEDICINE CLINIC | Facility: CLINIC | Age: 55
End: 2025-01-23
Payer: MEDICAID

## 2025-01-23 VITALS
BODY MASS INDEX: 28.02 KG/M2 | HEART RATE: 98 BPM | HEIGHT: 69 IN | SYSTOLIC BLOOD PRESSURE: 112 MMHG | DIASTOLIC BLOOD PRESSURE: 60 MMHG | RESPIRATION RATE: 16 BRPM | TEMPERATURE: 98.6 F | OXYGEN SATURATION: 97 % | WEIGHT: 189.2 LBS

## 2025-01-23 DIAGNOSIS — M54.41 CHRONIC MIDLINE LOW BACK PAIN WITH RIGHT-SIDED SCIATICA: ICD-10-CM

## 2025-01-23 DIAGNOSIS — J18.9 PNEUMONIA OF LEFT LOWER LOBE DUE TO INFECTIOUS ORGANISM: Primary | ICD-10-CM

## 2025-01-23 DIAGNOSIS — Z23 NEED FOR SHINGLES VACCINE: ICD-10-CM

## 2025-01-23 DIAGNOSIS — E66.3 OVERWEIGHT WITH BODY MASS INDEX (BMI) OF 27 TO 27.9 IN ADULT: ICD-10-CM

## 2025-01-23 DIAGNOSIS — G89.29 CHRONIC MIDLINE LOW BACK PAIN WITH RIGHT-SIDED SCIATICA: ICD-10-CM

## 2025-01-23 DIAGNOSIS — G89.29 CHRONIC LEFT SHOULDER PAIN: ICD-10-CM

## 2025-01-23 DIAGNOSIS — M25.512 CHRONIC LEFT SHOULDER PAIN: ICD-10-CM

## 2025-01-23 PROCEDURE — 1126F AMNT PAIN NOTED NONE PRSNT: CPT | Performed by: STUDENT IN AN ORGANIZED HEALTH CARE EDUCATION/TRAINING PROGRAM

## 2025-01-23 RX ORDER — DULOXETIN HYDROCHLORIDE 60 MG/1
120 CAPSULE, DELAYED RELEASE ORAL DAILY
Qty: 180 CAPSULE | Refills: 1 | Status: SHIPPED | OUTPATIENT
Start: 2025-01-23

## 2025-01-23 RX ORDER — TIZANIDINE 2 MG/1
2 TABLET ORAL NIGHTLY PRN
COMMUNITY
Start: 2025-01-11

## 2025-01-23 RX ORDER — DULOXETIN HYDROCHLORIDE 60 MG/1
60 CAPSULE, DELAYED RELEASE ORAL DAILY
Qty: 180 CAPSULE | Refills: 1 | Status: SHIPPED | OUTPATIENT
Start: 2025-01-23 | End: 2025-01-23 | Stop reason: SDUPTHER

## 2025-01-23 RX ORDER — PANTOPRAZOLE SODIUM 40 MG/1
TABLET, DELAYED RELEASE ORAL
COMMUNITY
Start: 2025-01-09

## 2025-01-23 RX ORDER — MELOXICAM 15 MG/1
1 TABLET ORAL DAILY
COMMUNITY
Start: 2025-01-11

## 2025-01-23 NOTE — PROGRESS NOTES
Subjective   Kayla Huber is a 54 y.o. female.   Chief Complaint   Patient presents with    Pneumonia    Chronic low back/shoulder pain       History of Present Illness     Pneumonia of left lower lobe  -Follow-up from 12/27/2024: Patient had been hospitalized for pneumonia but was still having a lot of respiratory symptoms after discharge.  Sent in Medrol Dosepak, albuterol solution for nebulizer, chest x-ray came back showing resolution of pneumonia  - Resolved      Chronic low back pain/chronic left shoulder pain  -Follow-up from 9/30/2024: Patient did see neurosurgery but pt declined further workup or surgical intervention on her neck.  They referred her to [facility] pain and spine for epidural injections.  She had an injection on 9/24/2024.  She was participating in physical therapy that was ordered by neurosurgery for her neck  -Patient stated that physical therapy would not take PCP orders for low back pain.  They would only take physical therapy orders from neurosurgery.  Asked her to request from neurosurgery to get a physical therapy order for her back as well  Today  - is established with [facility] Pain management. Will get another injection in February  - will get heart CT on 2/12/25  - will see GI next month for elevated ferritin and liver  -Patient states her hospital visit took her off meloxicam.  She states she takes it rarely as needed anyway  -Patient states she has been taking duloxetine 120 mg daily.  She states the hospital had her come off this medication but it seems to help somewhat with her pain so she continues it  -Patient brought in [facility] paperwork to have updated        Preventative:  -Offered Influenza, COVID vaccines, but pt declines  -Offered Shingrix vaccine (second), pt agrees vaccine administered, ABN signed      The following portions of the patient's history were reviewed and updated as appropriate: allergies, current medications, past family history, past medical  history, past social history, past surgical history, and problem list.    Patient Active Problem List   Diagnosis    Intractable chronic migraine without aura and with status migrainosus    Insomnia    Anxiety and depression    DDD (degenerative disc disease), cervical    Chronic left shoulder pain    Vitamin D deficiency    Mixed hyperlipidemia    Overweight with body mass index (BMI) of 26 to 26.9 in adult    Tobacco use disorder       Current Outpatient Medications on File Prior to Visit   Medication Sig Dispense Refill    albuterol (ACCUNEB) 0.63 MG/3ML nebulizer solution Take 3 mL by nebulization Every 6 (Six) Hours As Needed for Wheezing. 300 mL 1    albuterol sulfate  (90 Base) MCG/ACT inhaler Inhale 2 puffs Every 4 (Four) Hours As Needed for Wheezing. 18 g 0    amitriptyline (ELAVIL) 75 MG tablet Take 1 tablet by mouth Every Night. 30 tablet 1    cetirizine (zyrTEC) 10 MG tablet Take 1 tablet by mouth Daily. 90 tablet 3    dicyclomine (BENTYL) 10 MG capsule TAKE 1 CAPSULE BY MOUTH 4 TIMES DAILY BEFORE MEAL(S) AND AT BEDTIME 120 capsule 0    Fluticasone Propionate, Inhal, (Fluticasone Propionate Diskus) 50 MCG/ACT aerosol powder  INHALE 1 PUFF TWICE DAILY 60 each 0    meloxicam (MOBIC) 15 MG tablet Take 1 tablet by mouth Daily.      metoprolol tartrate (LOPRESSOR) 100 MG tablet Take 100 mg at Bedtime the Night Before Coronary CTA Appointment and In the Morning 1 Hour Prior to Coronary CTA Appointment. Do not take if heart rate less than 60. 2 tablet 0    ondansetron ODT (ZOFRAN-ODT) 4 MG disintegrating tablet Place 1 tablet on the tongue Every 8 (Eight) Hours As Needed for Nausea. 30 tablet 0    pantoprazole (PROTONIX) 40 MG EC tablet TAKE 1 TABLET BY MOUTH ONCE DAILY 1/2 TO 1 HOUR BEFORE MORNING MEAL      rizatriptan (MAXALT) 10 MG tablet Take 1 tablet by mouth As Needed.      rOPINIRole (REQUIP) 2 MG tablet Take 2 tablets by mouth Every Night.      tiZANidine (ZANAFLEX) 2 MG tablet Take 1 tablet by  "mouth At Night As Needed for Muscle Spasms.       No current facility-administered medications on file prior to visit.     Current outpatient and discharge medications have been reconciled for the patient.  Reviewed by: Chantal Benitez, DO      Allergies   Allergen Reactions    Amoxicillin Unknown - Low Severity    Penicillins Hives     CAN TAKE KEFLEX AND AMOXICILLIN         Objective   Visit Vitals  /60 (BP Location: Left arm, Patient Position: Sitting, Cuff Size: Adult)   Pulse 98   Temp 98.6 °F (37 °C) (Skin)   Resp 16   Ht 175.3 cm (69\")   Wt 85.8 kg (189 lb 3.2 oz)   LMP  (LMP Unknown)   SpO2 97%   BMI 27.94 kg/m²       Physical Exam  HENT:      Head: Normocephalic and atraumatic.   Eyes:      Conjunctiva/sclera: Conjunctivae normal.   Neurological:      General: No focal deficit present.      Mental Status: She is alert and oriented to person, place, and time.   Psychiatric:         Mood and Affect: Mood normal.         Behavior: Behavior normal.           Diagnoses and all orders for this visit:    1. Pneumonia of left lower lobe due to infectious organism (Primary)  - resolved    2. Chronic midline low back pain with right-sided sciatica/ Chronic left shoulder pain  -Patient has seen neurosurgery and is now seeing pain management.  Will get a few more injections and then be considered for possible nerve ablation according to patient  -    Refilled DULoxetine (CYMBALTA) 60 MG capsule; Take 2 capsules by mouth Daily.  Dispense: 180 capsule; Refill: 1  -FMLA paperwork was updated.  Copy was taken to fax over to patient's workplace and original was given to her      4. Overweight with body mass index (BMI) of 27 to 27.9 in adult  BMI is >= 25 and <30. (Overweight) The following options were offered after discussion;: exercise counseling/recommendations and nutrition counseling/recommendations         Follow Up  -7/10/2025 for annual physical exam    Expected course, medications, and adverse effects " discussed as appropriate.  Call or return if worsening or persistent symptoms. Hand hygiene was performed before donning protective equipment and after removal when leaving the room.    This document is intended for medical expert use only. Reading of this document by patients and/or patient's family without participating medical staff guidance may result in misinterpretation and unintended morbidity. Any interpretation of such data is the responsibility of the patient and/or family member responsible for the patient in concert with their primary or specialist providers, not to be left for sources of online searches such as [employer], [employer] or similar queries. Relying on these approaches to knowledge may result in misinterpretation, misguided goals of care and even death should patients or family members try recommendations outside of the realm of professional medical care.

## 2025-01-27 DIAGNOSIS — R19.7 DIARRHEA, UNSPECIFIED TYPE: ICD-10-CM

## 2025-01-27 DIAGNOSIS — R10.84 GENERALIZED ABDOMINAL PAIN: ICD-10-CM

## 2025-01-28 RX ORDER — DICYCLOMINE HYDROCHLORIDE 10 MG/1
10 CAPSULE ORAL
Qty: 120 CAPSULE | Refills: 0 | Status: SHIPPED | OUTPATIENT
Start: 2025-01-28

## 2025-01-28 NOTE — TELEPHONE ENCOUNTER
I will refill her dicyclomine.  Looking at her heme-onc note, it looks like she already has a gastroenterologist (Dr. Swan).  Can we ask her if this is her active gastroenterologist?  She will need 1 to help workup her liver, did offer a referral and heme-onc does not feel that her elevated ferritin and iron levels are due to hemochromatosis but they feel it is due to her liver.

## 2025-02-11 ENCOUNTER — DOCUMENTATION (OUTPATIENT)
Dept: PHYSICAL THERAPY | Facility: CLINIC | Age: 55
End: 2025-02-11
Payer: MEDICAID

## 2025-02-11 NOTE — PROGRESS NOTES
Discharge Summary  Discharge Summary from Physical Therapy Report      Dates  PT visit: 9/16/24  to 10/28/24  Number of Visits: 8     Discharge Status of Patient: See note dated 10/28/24    Goals: Partially Met    Discharge Plan: Continue with current home exercise program as instructed    Comments Pt has made some progress with decreasing UE neurological symptoms but they continue on a daily basis.    Unable to complete therapy or make final assessment as continued treatment was not approved by insurance.     Date of Discharge 2/11/25        Leobardo Reynolds, PT  Physical Therapist

## 2025-02-21 DIAGNOSIS — J18.9 PNEUMONIA OF LEFT LOWER LOBE DUE TO INFECTIOUS ORGANISM: ICD-10-CM

## 2025-02-21 RX ORDER — ALBUTEROL SULFATE 0.63 MG/3ML
SOLUTION RESPIRATORY (INHALATION) EVERY 6 HOURS PRN
Qty: 360 ML | Refills: 0 | Status: SHIPPED | OUTPATIENT
Start: 2025-02-21

## 2025-02-24 ENCOUNTER — TELEPHONE (OUTPATIENT)
Dept: FAMILY MEDICINE CLINIC | Facility: CLINIC | Age: 55
End: 2025-02-24
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
## 2025-02-27 ENCOUNTER — READMISSION MANAGEMENT (OUTPATIENT)
Dept: CALL CENTER | Facility: HOSPITAL | Age: 55
End: 2025-02-27
Payer: MEDICAID

## 2025-02-27 NOTE — OUTREACH NOTE
Prep Survey      Flowsheet Row Responses   [facility] facility patient discharged from? Non-[facility]   Is LACE score < 7 ? Non-BH Discharge   Eligibility [facility]   Date of Admission 02/24/25   Date of Discharge 02/27/25   Discharge diagnosis NSTEMI (non-ST elevated myocardial infarction)   Does the patient have one of the following disease processes/diagnoses(primary or secondary)? Acute MI (STEMI,NSTEMI)   Prep survey completed? Yes            Halie AVILEZ - Registered Nurse

## 2025-02-28 ENCOUNTER — TRANSITIONAL CARE MANAGEMENT TELEPHONE ENCOUNTER (OUTPATIENT)
Dept: CALL CENTER | Facility: HOSPITAL | Age: 55
End: 2025-02-28
Payer: MEDICAID

## 2025-02-28 DIAGNOSIS — G47.09 OTHER INSOMNIA: ICD-10-CM

## 2025-02-28 RX ORDER — AMITRIPTYLINE HYDROCHLORIDE 75 MG/1
75 TABLET ORAL NIGHTLY
Qty: 30 TABLET | Refills: 0 | Status: SHIPPED | OUTPATIENT
Start: 2025-02-28

## 2025-02-28 NOTE — OUTREACH NOTE
Call Center TCM Note      Flowsheet Row Responses   [facility], [place], operated by [facility] patient discharged from? Non-BH   Does the patient have one of the following disease processes/diagnoses(primary or secondary)? Acute MI (STEMI,NSTEMI)   TCM attempt successful? Yes   Call start time 1053   Call end time 1100   Discharge diagnosis NSTEMI (non-ST elevated myocardial infarction)   Person spoke with today (if not patient) and relationship pt   Meds reviewed with patient/caregiver? Yes   Is the patient having any side effects they believe may be caused by any medication additions or changes? No   Does the patient have all medications ordered at discharge? Yes   Is the patient taking all medications as directed (includes completed medication regime)? Yes   Comments CTA 3/17/25   Does the patient have an appointment with their PCP within 7-14 days of discharge? No appointments available   Nursing Interventions Routed TCM call to PCP office, PCP office requested to make appointment - message sent   Psychosocial issues? No   Did the patient receive a copy of their discharge instructions? Yes   Nursing interventions Reviewed instructions with patient   What is the patient's perception of their health status since discharge? Improving   Is the patient/caregiver able to teach back signs and symptoms related to disease process for when to call PCP? Yes   Is the patient/caregiver able to teach back signs and symptoms related to disease process for when to call 911? Yes   Is the patient/caregiver able to teach back the hierarchy of who to call/visit for symptoms/problems? PCP, Specialist, Home health nurse, Urgent Care, ED, 911 Yes   If the patient is a current smoker, are they able to teach back resources for cessation? Not a smoker   TCM call completed? Yes   Wrap up additional comments Pt denies chest pain/SBO. Reviewed new meds with pt. Pt verified cardiology fu appt   Call end time 1100   Would this patient benefit from a Referral to Amb  Social Work? No   Is the patient interested in additional calls from an ambulatory ? No            Shadia Lei RN    2/28/2025, 11:10 EST

## 2025-03-20 ENCOUNTER — OFFICE VISIT (OUTPATIENT)
Dept: FAMILY MEDICINE CLINIC | Facility: CLINIC | Age: 55
End: 2025-03-20
Payer: MEDICAID

## 2025-03-20 VITALS
HEIGHT: 69 IN | SYSTOLIC BLOOD PRESSURE: 104 MMHG | BODY MASS INDEX: 28.2 KG/M2 | HEART RATE: 105 BPM | RESPIRATION RATE: 18 BRPM | TEMPERATURE: 96.6 F | OXYGEN SATURATION: 96 % | DIASTOLIC BLOOD PRESSURE: 62 MMHG | WEIGHT: 190.4 LBS

## 2025-03-20 DIAGNOSIS — R42 VERTIGO: ICD-10-CM

## 2025-03-20 DIAGNOSIS — R06.02 SHORTNESS OF BREATH: ICD-10-CM

## 2025-03-20 DIAGNOSIS — R55 SYNCOPE, UNSPECIFIED SYNCOPE TYPE: ICD-10-CM

## 2025-03-20 DIAGNOSIS — R05.1 ACUTE COUGH: ICD-10-CM

## 2025-03-20 DIAGNOSIS — I95.9 HYPOTENSION, UNSPECIFIED HYPOTENSION TYPE: ICD-10-CM

## 2025-03-20 DIAGNOSIS — Z09 HOSPITAL DISCHARGE FOLLOW-UP: Primary | ICD-10-CM

## 2025-03-20 LAB
EXPIRATION DATE: NORMAL
FLUAV AG UPPER RESP QL IA.RAPID: NOT DETECTED
FLUBV AG UPPER RESP QL IA.RAPID: NOT DETECTED
INTERNAL CONTROL: NORMAL
Lab: NORMAL
SARS-COV-2 AG UPPER RESP QL IA.RAPID: NOT DETECTED

## 2025-03-20 RX ORDER — PREDNISONE 10 MG/1
40 TABLET ORAL DAILY
Qty: 20 TABLET | Refills: 0 | Status: SHIPPED | OUTPATIENT
Start: 2025-03-20 | End: 2025-03-25

## 2025-03-20 RX ORDER — SCOPOLAMINE 1 MG/3D
1 PATCH, EXTENDED RELEASE TRANSDERMAL
COMMUNITY
Start: 2025-02-27

## 2025-03-20 RX ORDER — MECLIZINE HYDROCHLORIDE 25 MG/1
25 TABLET ORAL 3 TIMES DAILY PRN
COMMUNITY
Start: 2025-02-27

## 2025-03-20 RX ORDER — TOPIRAMATE 200 MG/1
200 TABLET, FILM COATED ORAL 2 TIMES DAILY
COMMUNITY

## 2025-03-20 RX ORDER — VARENICLINE TARTRATE 1 MG/1
1 TABLET, FILM COATED ORAL 2 TIMES DAILY
COMMUNITY

## 2025-03-20 RX ORDER — ASPIRIN 81 MG/1
81 TABLET ORAL DAILY
COMMUNITY
Start: 2025-02-28 | End: 2025-04-29

## 2025-03-20 RX ORDER — ATORVASTATIN CALCIUM 40 MG/1
40 TABLET, FILM COATED ORAL DAILY
COMMUNITY
Start: 2025-02-28 | End: 2025-04-29

## 2025-03-20 RX ORDER — MECLIZINE HYDROCHLORIDE 50 MG/1
50 TABLET ORAL 3 TIMES DAILY PRN
Refills: 0 | Status: CANCELLED | OUTPATIENT
Start: 2025-03-20

## 2025-03-20 RX ORDER — METHYLPREDNISOLONE 4 MG/1
TABLET ORAL
Qty: 21 TABLET | Refills: 0 | Status: CANCELLED | OUTPATIENT
Start: 2025-03-20

## 2025-03-20 RX ORDER — BENZONATATE 100 MG/1
200 CAPSULE ORAL 3 TIMES DAILY PRN
Qty: 42 CAPSULE | Refills: 0 | Status: CANCELLED | OUTPATIENT
Start: 2025-03-20

## 2025-03-20 RX ORDER — SUCRALFATE 1 G/1
1 TABLET ORAL 3 TIMES DAILY
COMMUNITY

## 2025-03-20 NOTE — PROGRESS NOTES
Subjective   Kayla Huber is a 54 y.o. female.   Chief Complaint   Patient presents with    Hospital Follow Up Visit       History of Present Illness     Hospital follow up:  Kayla was seen at [facility].   She was admitted on 02/24/2025  for NSTEMI, syncope, chronic dizziness, GERD, shock/hypotension, hypomagnesemia.   She was discharged on 02/27/2025.   Course of Events  -Patient admitted for syncopal episode with hypotension requiring pressors.  Patient was weaned off pressors quickly  -Negative for infection and PE  - mild elevated troponin and abnormal stress test, cardiology was consulted and had LHC on 2/26/2025 without evidence of obstruction CAD warranting PCI  -Recommended to start taking aspirin 81mg, atorvastatin 40mg, metoprolol 12.5 mg twice daily, scopolamine 1 mg patch every 3 days  - Discharged home when she felt back to baseline    Workup  -Chest CTA: Borderline heart size with mild dilation of left atrium.  Coronary artery calcification.  Anatomic variation with persistent left-sided SVC communicating with the coronary sinus  -Head CT without contrast: No acute abnormality  -Cardiac echo: EF 50 to 55%.  Diastolic function indeterminant.  Otherwise normal  Cervical spine CT without contrast: Mild kyphosis.  Mild spondylosis-    Today  - Patient states that she has not had any syncopal episodes since her discharge. She is complaining of shortness of breath with all activity,   - Patient currently wearing a holter monitor since 3/11/25 for 14 days. Currently scheduled for removal on 3/25/25.  - has repeat CTA of the heart ordered by Dr Dimitris Horne in [place] on 3/31/25 and seeing him in person on 7/14/25. But may be seen sooner depending on results      Vertigo  - reports worsening vertigo, would like to know if her Meclizine can be increased.   - Currently taking meclizine 25mg from Dr Shyann Wallace (90 tablets) on 2/27/25 from the hospital  - will see the environment move  -  wears glasses, saw eye doctor a few months ago and prescription is up to date  - denies light sensitivity, nausea, or sound sensitivity      SOB/Cough  - She is complaining of shortness of breath with all activity. Reports cough for the past few days, reports it is a non productive cough  - Stress test on 12/5/2024 came back showing intermediate risk study.  Mild intensity small size reversible defect in basal inferior wall during stress test.  No signs of myocardial ischemia, normal ECG stress test  - has albuterol inhaler and nebulizers. Nebulizers help  - fluticasone propionate inhaler (10/28/24) and albuterol inhaler not helpful        The following portions of the patient's history were reviewed and updated as appropriate: allergies, current medications, past family history, past medical history, past social history, past surgical history, and problem list.    Patient Active Problem List   Diagnosis    Intractable chronic migraine without aura and with status migrainosus    Insomnia    Anxiety and depression    DDD (degenerative disc disease), cervical    Chronic left shoulder pain    Vitamin D deficiency    Mixed hyperlipidemia    Overweight with body mass index (BMI) of 26 to 26.9 in adult    Tobacco use disorder       Current Outpatient Medications on File Prior to Visit   Medication Sig Dispense Refill    albuterol (ACCUNEB) 0.63 MG/3ML nebulizer solution USE 1 VIAL IN NEBULIZER EVERY 6 HOURS AS NEEDED FOR WHEEZING 360 mL 0    albuterol sulfate  (90 Base) MCG/ACT inhaler Inhale 2 puffs Every 4 (Four) Hours As Needed for Wheezing. 18 g 0    amitriptyline (ELAVIL) 75 MG tablet TAKE 1 TABLET BY MOUTH ONCE DAILY AT NIGHT 30 tablet 0    aspirin 81 MG EC tablet Take 1 tablet by mouth Daily.      atorvastatin (LIPITOR) 40 MG tablet Take 1 tablet by mouth Daily.      cetirizine (zyrTEC) 10 MG tablet Take 1 tablet by mouth Daily. 90 tablet 3    dicyclomine (BENTYL) 10 MG capsule TAKE 1 CAPSULE BY MOUTH 4  TIMES DAILY BEFORE MEAL(S) AND AT BEDTIME 120 capsule 0    DULoxetine (CYMBALTA) 60 MG capsule Take 2 capsules by mouth Daily. 180 capsule 1    Fluticasone Propionate, Inhal, (Fluticasone Propionate Diskus) 50 MCG/ACT aerosol powder  INHALE 1 PUFF TWICE DAILY 60 each 0    meclizine (ANTIVERT) 25 MG tablet Take 1 tablet by mouth 3 (Three) Times a Day As Needed for Dizziness.      meloxicam (MOBIC) 15 MG tablet Take 1 tablet by mouth Daily.      metoprolol tartrate (LOPRESSOR) 100 MG tablet Take 100 mg at Bedtime the Night Before Coronary CTA Appointment and In the Morning 1 Hour Prior to Coronary CTA Appointment. Do not take if heart rate less than 60. 2 tablet 0    ondansetron ODT (ZOFRAN-ODT) 4 MG disintegrating tablet Place 1 tablet on the tongue Every 8 (Eight) Hours As Needed for Nausea. 30 tablet 0    pantoprazole (PROTONIX) 40 MG EC tablet TAKE 1 TABLET BY MOUTH ONCE DAILY 1/2 TO 1 HOUR BEFORE MORNING MEAL      rizatriptan (MAXALT) 10 MG tablet Take 1 tablet by mouth As Needed.      rOPINIRole (REQUIP) 2 MG tablet Take 2 tablets by mouth Every Night.      Scopolamine 1 MG/3DAYS patch Place 1 patch on the skin as directed by provider Every 72 (Seventy-Two) Hours. (Patient taking differently: Place 1 patch on the skin as directed by provider Every 72 (Seventy-Two) Hours. Only PRN)      sucralfate (CARAFATE) 1 g tablet Take 1 tablet by mouth 3 (Three) Times a Day.      tiZANidine (ZANAFLEX) 2 MG tablet Take 1 tablet by mouth At Night As Needed for Muscle Spasms.      topiramate (TOPAMAX) 200 MG tablet Take 1 tablet by mouth 2 (Two) Times a Day.      varenicline (CHANTIX) 1 MG tablet Take 1 tablet by mouth 2 (Two) Times a Day.       No current facility-administered medications on file prior to visit.     Current outpatient and discharge medications have been reconciled for the patient.  Reviewed by: Chantal Benitez DO      Allergies   Allergen Reactions    Amoxicillin Unknown - Low Severity    Penicillins Hives     " CAN TAKE KEFLEX AND AMOXICILLIN         Objective   Visit Vitals  /62 (BP Location: Right arm, Patient Position: Sitting, Cuff Size: Adult)   Pulse 105   Temp 96.6 °F (35.9 °C) (Temporal)   Resp 18   Ht 175.3 cm (69\")   Wt 86.4 kg (190 lb 6.4 oz)   LMP  (LMP Unknown)   SpO2 96%   BMI 28.12 kg/m²       Physical Exam  HENT:      Head: Normocephalic and atraumatic.      Right Ear: There is impacted cerumen.      Ears:      Comments: - Serous fluid behind left tympanic membrane.  No erythema or purulence noted  Eyes:      Conjunctiva/sclera: Conjunctivae normal.   Cardiovascular:      Rate and Rhythm: Normal rate and regular rhythm.      Heart sounds: Normal heart sounds.   Pulmonary:      Effort: Pulmonary effort is normal. No respiratory distress.      Breath sounds: Normal breath sounds. No wheezing, rhonchi or rales.   Neurological:      General: No focal deficit present.      Mental Status: She is alert and oriented to person, place, and time.   Psychiatric:         Mood and Affect: Mood normal.         Behavior: Behavior normal.           Diagnoses and all orders for this visit:    1. Hospital discharge follow-up (Primary)/ Syncope, unspecified syncope type/ Hypotension, unspecified hypotension type  -Patient denies any further episodes of syncope since discharge.  She will be seeing cardiology in July (possibly sooner if the imaging at the end of the month comes back abnormal)    4. Acute cough/ Shortness of breath/ Vertigo  -     POCT SARS-CoV-2 Antigen BRIAN + Flu: all negative  -Discussed with patient that if she is getting sick, it could cause some inflammation in the inner ear which might be exacerbating vertigo for her. She has had a cough for the last 3 days. Discussed she could be getting sick with another virus.  Patient has a history of 33 years of smoking.  Will address with steroids, will see back and if no improvement, will consider a different daily inhaler  -     predniSONE (DELTASONE) 10 MG " tablet; Take 4 tablets by mouth Daily for 5 days.  Dispense: 20 tablet; Refill: 0  -Encouraged patient to continue nebulizer treatments          Follow Up  -7/10/2025  for annual physical exam  - 1:45 4/3/25 sob and cough    Expected course, medications, and adverse effects discussed as appropriate.  Call or return if worsening or persistent symptoms. Hand hygiene was performed before donning protective equipment and after removal when leaving the room.    This document is intended for medical expert use only. Reading of this document by patients and/or patient's family without participating medical staff guidance may result in misinterpretation and unintended morbidity. Any interpretation of such data is the responsibility of the patient and/or family member responsible for the patient in concert with their primary or specialist providers, not to be left for sources of online searches such as [employer], Night Up or similar queries. Relying on these approaches to knowledge may result in misinterpretation, misguided goals of care and even death should patients or family members try recommendations outside of the realm of professional medical care.

## 2025-03-24 ENCOUNTER — TELEPHONE (OUTPATIENT)
Dept: FAMILY MEDICINE CLINIC | Facility: CLINIC | Age: 55
End: 2025-03-24
Payer: MEDICAID

## 2025-03-24 DIAGNOSIS — F17.200 TOBACCO USE DISORDER: ICD-10-CM

## 2025-03-24 DIAGNOSIS — R06.02 SOB (SHORTNESS OF BREATH): Primary | ICD-10-CM

## 2025-03-24 DIAGNOSIS — R05.3 PERSISTENT COUGH: ICD-10-CM

## 2025-03-24 NOTE — TELEPHONE ENCOUNTER
Caller: Kayla Huber     Relationship: [unfilled]     Best call back number:     [phone]        What is your medical concern? BAD COUGHING- RIB PAIN  WHEEZING    How long has this issue been going on? OVER WEEK    Is your provider already aware of this issue? YES    Have you been treated for this issue? YES  MEDICATION GIVEN AT THE APPOINTMENT ON 3/20/25 ARE NOT WORKING.  PLEASE SEND A DIFFERENT MEDICATION  [place] [id number] [facility] [place] [facility] [address] [facility] [address]  - [phone] [facility] [phone] FX

## 2025-03-25 RX ORDER — BENZONATATE 100 MG/1
200 CAPSULE ORAL 3 TIMES DAILY PRN
Qty: 42 CAPSULE | Refills: 0 | Status: SHIPPED | OUTPATIENT
Start: 2025-03-25

## 2025-03-25 NOTE — TELEPHONE ENCOUNTER
See her on 4/3/2025 for follow-up on the cough.  Told her on last appointment if the prednisone did not resolve this then she might need a different daily inhaler.  Sending in generic Incruse Ellipta for her to start taking daily rather than her fluticasone propionate inhaler

## 2025-03-26 ENCOUNTER — HOSPITAL ENCOUNTER (INPATIENT)
Facility: HOSPITAL | Age: 55
LOS: 5 days | Discharge: HOME | End: 2025-03-31
Attending: STUDENT IN AN ORGANIZED HEALTH CARE EDUCATION/TRAINING PROGRAM | Admitting: STUDENT IN AN ORGANIZED HEALTH CARE EDUCATION/TRAINING PROGRAM
Payer: MEDICAID

## 2025-03-26 ENCOUNTER — INPATIENT HOSPITAL (OUTPATIENT)
Dept: URBAN - METROPOLITAN AREA HOSPITAL 84 | Facility: HOSPITAL | Age: 55
End: 2025-03-26
Payer: COMMERCIAL

## 2025-03-26 ENCOUNTER — APPOINTMENT (OUTPATIENT)
Dept: GENERAL RADIOLOGY | Facility: HOSPITAL | Age: 55
End: 2025-03-26
Payer: MEDICAID

## 2025-03-26 ENCOUNTER — TELEPHONE (OUTPATIENT)
Dept: FAMILY MEDICINE CLINIC | Facility: CLINIC | Age: 55
End: 2025-03-26

## 2025-03-26 ENCOUNTER — APPOINTMENT (OUTPATIENT)
Dept: ULTRASOUND IMAGING | Facility: HOSPITAL | Age: 55
End: 2025-03-26
Payer: MEDICAID

## 2025-03-26 DIAGNOSIS — D53.9 NUTRITIONAL ANEMIA, UNSPECIFIED: ICD-10-CM

## 2025-03-26 DIAGNOSIS — D69.6 THROMBOCYTOPENIA, UNSPECIFIED: ICD-10-CM

## 2025-03-26 DIAGNOSIS — R74.01 ELEVATION OF LEVELS OF LIVER TRANSAMINASE LEVELS: ICD-10-CM

## 2025-03-26 DIAGNOSIS — R11.2 NAUSEA WITH VOMITING, UNSPECIFIED: ICD-10-CM

## 2025-03-26 DIAGNOSIS — K59.00 CONSTIPATION, UNSPECIFIED: ICD-10-CM

## 2025-03-26 DIAGNOSIS — K76.0 FATTY (CHANGE OF) LIVER, NOT ELSEWHERE CLASSIFIED: ICD-10-CM

## 2025-03-26 DIAGNOSIS — R14.0 ABDOMINAL DISTENSION (GASEOUS): ICD-10-CM

## 2025-03-26 PROBLEM — J18.9 PNEUMONIA: Status: ACTIVE | Noted: 2025-03-26

## 2025-03-26 LAB
ALBUMIN SERPL-MCNC: 3.4 G/DL (ref 3.5–5.2)
ALBUMIN/GLOB SERPL: 1.5 G/DL
ALP SERPL-CCNC: 274 U/L (ref 39–117)
ALT SERPL W P-5'-P-CCNC: 157 U/L (ref 1–33)
ANION GAP SERPL CALCULATED.3IONS-SCNC: 12.2 MMOL/L (ref 5–15)
APAP SERPL-MCNC: <5 MCG/ML (ref 0–30)
AST SERPL-CCNC: 360 U/L (ref 1–32)
B PARAPERT DNA SPEC QL NAA+PROBE: NOT DETECTED
B PERT DNA SPEC QL NAA+PROBE: NOT DETECTED
BASOPHILS # BLD AUTO: 0.02 10*3/MM3 (ref 0–0.2)
BASOPHILS NFR BLD AUTO: 0.3 % (ref 0–1.5)
BILIRUB SERPL-MCNC: 0.9 MG/DL (ref 0–1.2)
BUN SERPL-MCNC: 7 MG/DL (ref 6–20)
BUN/CREAT SERPL: 13 (ref 7–25)
C PNEUM DNA NPH QL NAA+NON-PROBE: NOT DETECTED
CALCIUM SPEC-SCNC: 8.5 MG/DL (ref 8.6–10.5)
CHLORIDE SERPL-SCNC: 102 MMOL/L (ref 98–107)
CO2 SERPL-SCNC: 23.8 MMOL/L (ref 22–29)
CREAT SERPL-MCNC: 0.54 MG/DL (ref 0.57–1)
D-LACTATE SERPL-SCNC: 2.5 MMOL/L (ref 0.5–2)
D-LACTATE SERPL-SCNC: 2.9 MMOL/L (ref 0.5–2)
D-LACTATE SERPL-SCNC: 3.2 MMOL/L (ref 0.5–2)
DEPRECATED RDW RBC AUTO: 61.8 FL (ref 37–54)
EGFRCR SERPLBLD CKD-EPI 2021: 110.2 ML/MIN/1.73
EOSINOPHIL # BLD AUTO: 0 10*3/MM3 (ref 0–0.4)
EOSINOPHIL NFR BLD AUTO: 0 % (ref 0.3–6.2)
ERYTHROCYTE [DISTWIDTH] IN BLOOD BY AUTOMATED COUNT: 17 % (ref 12.3–15.4)
FLUAV SUBTYP SPEC NAA+PROBE: NOT DETECTED
FLUBV RNA ISLT QL NAA+PROBE: NOT DETECTED
GLOBULIN UR ELPH-MCNC: 2.3 GM/DL
GLUCOSE SERPL-MCNC: 106 MG/DL (ref 65–99)
HADV DNA SPEC NAA+PROBE: NOT DETECTED
HAV IGM SERPL QL IA: NORMAL
HBV CORE IGM SERPL QL IA: NORMAL
HBV SURFACE AG SERPL QL IA: NORMAL
HCOV 229E RNA SPEC QL NAA+PROBE: NOT DETECTED
HCOV HKU1 RNA SPEC QL NAA+PROBE: NOT DETECTED
HCOV NL63 RNA SPEC QL NAA+PROBE: NOT DETECTED
HCOV OC43 RNA SPEC QL NAA+PROBE: NOT DETECTED
HCT VFR BLD AUTO: 34.8 % (ref 34–46.6)
HCV AB SER QL: NORMAL
HGB BLD-MCNC: 10.9 G/DL (ref 12–15.9)
HMPV RNA NPH QL NAA+NON-PROBE: DETECTED
HPIV1 RNA ISLT QL NAA+PROBE: NOT DETECTED
HPIV2 RNA SPEC QL NAA+PROBE: NOT DETECTED
HPIV3 RNA NPH QL NAA+PROBE: NOT DETECTED
HPIV4 P GENE NPH QL NAA+PROBE: NOT DETECTED
IMM GRANULOCYTES # BLD AUTO: 0.23 10*3/MM3 (ref 0–0.05)
IMM GRANULOCYTES NFR BLD AUTO: 4 % (ref 0–0.5)
INR PPP: 1.1 (ref 0.9–1.1)
IRON 24H UR-MRATE: 95 MCG/DL (ref 37–145)
IRON SATN MFR SERPL: 30 % (ref 20–50)
L PNEUMO1 AG UR QL IA: NEGATIVE
LYMPHOCYTES # BLD AUTO: 1.67 10*3/MM3 (ref 0.7–3.1)
LYMPHOCYTES NFR BLD AUTO: 29.1 % (ref 19.6–45.3)
M PNEUMO IGG SER IA-ACNC: NOT DETECTED
MCH RBC QN AUTO: 31.4 PG (ref 26.6–33)
MCHC RBC AUTO-ENTMCNC: 31.3 G/DL (ref 31.5–35.7)
MCV RBC AUTO: 100.3 FL (ref 79–97)
MONOCYTES # BLD AUTO: 0.53 10*3/MM3 (ref 0.1–0.9)
MONOCYTES NFR BLD AUTO: 9.2 % (ref 5–12)
NEUTROPHILS NFR BLD AUTO: 3.28 10*3/MM3 (ref 1.7–7)
NEUTROPHILS NFR BLD AUTO: 57.4 % (ref 42.7–76)
NRBC BLD AUTO-RTO: 0.9 /100 WBC (ref 0–0.2)
PLATELET # BLD AUTO: 127 10*3/MM3 (ref 140–450)
PMV BLD AUTO: 10.2 FL (ref 6–12)
POTASSIUM SERPL-SCNC: 3.3 MMOL/L (ref 3.5–5.2)
PROT SERPL-MCNC: 5.7 G/DL (ref 6–8.5)
PROTHROMBIN TIME: 14.1 SECONDS (ref 11.7–14.2)
RBC # BLD AUTO: 3.47 10*6/MM3 (ref 3.77–5.28)
RHINOVIRUS RNA SPEC NAA+PROBE: NOT DETECTED
RSV RNA NPH QL NAA+NON-PROBE: NOT DETECTED
S PNEUM AG SPEC QL LA: NEGATIVE
SALICYLATES SERPL-MCNC: <0.5 MG/DL
SARS-COV-2 RNA RESP QL NAA+PROBE: NOT DETECTED
SODIUM SERPL-SCNC: 138 MMOL/L (ref 136–145)
TIBC SERPL-MCNC: 319 MCG/DL (ref 298–536)
TRANSFERRIN SERPL-MCNC: 214 MG/DL (ref 200–360)
WBC NRBC COR # BLD AUTO: 5.73 10*3/MM3 (ref 3.4–10.8)

## 2025-03-26 PROCEDURE — 25010000002 ENOXAPARIN PER 10 MG: Performed by: STUDENT IN AN ORGANIZED HEALTH CARE EDUCATION/TRAINING PROGRAM

## 2025-03-26 PROCEDURE — 83605 ASSAY OF LACTIC ACID: CPT | Performed by: STUDENT IN AN ORGANIZED HEALTH CARE EDUCATION/TRAINING PROGRAM

## 2025-03-26 PROCEDURE — 83010 ASSAY OF HAPTOGLOBIN QUANT: CPT

## 2025-03-26 PROCEDURE — 0202U NFCT DS 22 TRGT SARS-COV-2: CPT | Performed by: STUDENT IN AN ORGANIZED HEALTH CARE EDUCATION/TRAINING PROGRAM

## 2025-03-26 PROCEDURE — 84466 ASSAY OF TRANSFERRIN: CPT

## 2025-03-26 PROCEDURE — 74018 RADEX ABDOMEN 1 VIEW: CPT

## 2025-03-26 PROCEDURE — 97161 PT EVAL LOW COMPLEX 20 MIN: CPT

## 2025-03-26 PROCEDURE — 86015 ACTIN ANTIBODY EACH: CPT

## 2025-03-26 PROCEDURE — 85610 PROTHROMBIN TIME: CPT | Performed by: STUDENT IN AN ORGANIZED HEALTH CARE EDUCATION/TRAINING PROGRAM

## 2025-03-26 PROCEDURE — 80143 DRUG ASSAY ACETAMINOPHEN: CPT | Performed by: STUDENT IN AN ORGANIZED HEALTH CARE EDUCATION/TRAINING PROGRAM

## 2025-03-26 PROCEDURE — 86038 ANTINUCLEAR ANTIBODIES: CPT

## 2025-03-26 PROCEDURE — 86381 MITOCHONDRIAL ANTIBODY EACH: CPT

## 2025-03-26 PROCEDURE — 94761 N-INVAS EAR/PLS OXIMETRY MLT: CPT

## 2025-03-26 PROCEDURE — 80074 ACUTE HEPATITIS PANEL: CPT | Performed by: STUDENT IN AN ORGANIZED HEALTH CARE EDUCATION/TRAINING PROGRAM

## 2025-03-26 PROCEDURE — 99222 1ST HOSP IP/OBS MODERATE 55: CPT

## 2025-03-26 PROCEDURE — 80053 COMPREHEN METABOLIC PANEL: CPT | Performed by: STUDENT IN AN ORGANIZED HEALTH CARE EDUCATION/TRAINING PROGRAM

## 2025-03-26 PROCEDURE — 82784 ASSAY IGA/IGD/IGG/IGM EACH: CPT

## 2025-03-26 PROCEDURE — 83540 ASSAY OF IRON: CPT

## 2025-03-26 PROCEDURE — 82977 ASSAY OF GGT: CPT

## 2025-03-26 PROCEDURE — 80179 DRUG ASSAY SALICYLATE: CPT | Performed by: STUDENT IN AN ORGANIZED HEALTH CARE EDUCATION/TRAINING PROGRAM

## 2025-03-26 PROCEDURE — 85025 COMPLETE CBC W/AUTO DIFF WBC: CPT | Performed by: STUDENT IN AN ORGANIZED HEALTH CARE EDUCATION/TRAINING PROGRAM

## 2025-03-26 PROCEDURE — 82390 ASSAY OF CERULOPLASMIN: CPT

## 2025-03-26 PROCEDURE — 87449 NOS EACH ORGANISM AG IA: CPT | Performed by: STUDENT IN AN ORGANIZED HEALTH CARE EDUCATION/TRAINING PROGRAM

## 2025-03-26 PROCEDURE — 25010000002 CEFTRIAXONE PER 250 MG: Performed by: STUDENT IN AN ORGANIZED HEALTH CARE EDUCATION/TRAINING PROGRAM

## 2025-03-26 PROCEDURE — 25810000003 LACTATED RINGERS SOLUTION: Performed by: STUDENT IN AN ORGANIZED HEALTH CARE EDUCATION/TRAINING PROGRAM

## 2025-03-26 PROCEDURE — 76705 ECHO EXAM OF ABDOMEN: CPT

## 2025-03-26 RX ORDER — SORBITOL SOLUTION 70 %
30 SOLUTION, ORAL MISCELLANEOUS DAILY
Status: DISCONTINUED | OUTPATIENT
Start: 2025-03-26 | End: 2025-03-31 | Stop reason: HOSPADM

## 2025-03-26 RX ORDER — AMOXICILLIN 250 MG
2 CAPSULE ORAL 2 TIMES DAILY PRN
Status: DISCONTINUED | OUTPATIENT
Start: 2025-03-26 | End: 2025-03-31 | Stop reason: HOSPADM

## 2025-03-26 RX ORDER — BISACODYL 5 MG/1
5 TABLET, DELAYED RELEASE ORAL DAILY PRN
Status: DISCONTINUED | OUTPATIENT
Start: 2025-03-26 | End: 2025-03-31 | Stop reason: HOSPADM

## 2025-03-26 RX ORDER — SODIUM CHLORIDE 0.9 % (FLUSH) 0.9 %
10 SYRINGE (ML) INJECTION EVERY 12 HOURS SCHEDULED
Status: DISCONTINUED | OUTPATIENT
Start: 2025-03-26 | End: 2025-03-31 | Stop reason: HOSPADM

## 2025-03-26 RX ORDER — ONDANSETRON 2 MG/ML
4 INJECTION INTRAMUSCULAR; INTRAVENOUS EVERY 6 HOURS PRN
Status: DISCONTINUED | OUTPATIENT
Start: 2025-03-26 | End: 2025-03-31 | Stop reason: HOSPADM

## 2025-03-26 RX ORDER — POTASSIUM CHLORIDE 1.5 G/1.58G
40 POWDER, FOR SOLUTION ORAL EVERY 4 HOURS
Status: COMPLETED | OUTPATIENT
Start: 2025-03-26 | End: 2025-03-26

## 2025-03-26 RX ORDER — SODIUM CHLORIDE 9 MG/ML
40 INJECTION, SOLUTION INTRAVENOUS AS NEEDED
Status: DISCONTINUED | OUTPATIENT
Start: 2025-03-26 | End: 2025-03-31 | Stop reason: HOSPADM

## 2025-03-26 RX ORDER — DULOXETIN HYDROCHLORIDE 30 MG/1
120 CAPSULE, DELAYED RELEASE ORAL DAILY
Status: DISCONTINUED | OUTPATIENT
Start: 2025-03-27 | End: 2025-03-31 | Stop reason: HOSPADM

## 2025-03-26 RX ORDER — POLYETHYLENE GLYCOL 3350 17 G/17G
17 POWDER, FOR SOLUTION ORAL DAILY PRN
Status: DISCONTINUED | OUTPATIENT
Start: 2025-03-26 | End: 2025-03-31 | Stop reason: HOSPADM

## 2025-03-26 RX ORDER — NITROGLYCERIN 0.4 MG/1
0.4 TABLET SUBLINGUAL
Status: DISCONTINUED | OUTPATIENT
Start: 2025-03-26 | End: 2025-03-31 | Stop reason: HOSPADM

## 2025-03-26 RX ORDER — SODIUM CHLORIDE 0.9 % (FLUSH) 0.9 %
10 SYRINGE (ML) INJECTION AS NEEDED
Status: DISCONTINUED | OUTPATIENT
Start: 2025-03-26 | End: 2025-03-31 | Stop reason: HOSPADM

## 2025-03-26 RX ORDER — ENOXAPARIN SODIUM 100 MG/ML
40 INJECTION SUBCUTANEOUS DAILY
Status: DISCONTINUED | OUTPATIENT
Start: 2025-03-26 | End: 2025-03-31 | Stop reason: HOSPADM

## 2025-03-26 RX ORDER — BISACODYL 10 MG
10 SUPPOSITORY, RECTAL RECTAL DAILY PRN
Status: DISCONTINUED | OUTPATIENT
Start: 2025-03-26 | End: 2025-03-31 | Stop reason: HOSPADM

## 2025-03-26 RX ORDER — POTASSIUM CHLORIDE 1500 MG/1
40 TABLET, EXTENDED RELEASE ORAL EVERY 4 HOURS
Status: DISCONTINUED | OUTPATIENT
Start: 2025-03-26 | End: 2025-03-26 | Stop reason: SDUPTHER

## 2025-03-26 RX ORDER — MORPHINE SULFATE 2 MG/ML
2 INJECTION, SOLUTION INTRAMUSCULAR; INTRAVENOUS ONCE AS NEEDED
Status: COMPLETED | OUTPATIENT
Start: 2025-03-26 | End: 2025-03-27

## 2025-03-26 RX ORDER — LUBIPROSTONE 24 UG/1
24 CAPSULE ORAL 2 TIMES DAILY WITH MEALS
Status: DISCONTINUED | OUTPATIENT
Start: 2025-03-26 | End: 2025-03-27

## 2025-03-26 RX ORDER — IPRATROPIUM BROMIDE AND ALBUTEROL SULFATE 2.5; .5 MG/3ML; MG/3ML
3 SOLUTION RESPIRATORY (INHALATION) EVERY 6 HOURS PRN
Status: DISCONTINUED | OUTPATIENT
Start: 2025-03-26 | End: 2025-03-30

## 2025-03-26 RX ADMIN — POTASSIUM CHLORIDE 40 MEQ: 1.5 POWDER, FOR SOLUTION ORAL at 22:29

## 2025-03-26 RX ADMIN — LUBIPROSTONE 24 MCG: 24 CAPSULE, GELATIN COATED ORAL at 18:42

## 2025-03-26 RX ADMIN — AMITRIPTYLINE HYDROCHLORIDE 75 MG: 50 TABLET, FILM COATED ORAL at 22:29

## 2025-03-26 RX ADMIN — CEFTRIAXONE 2000 MG: 2 INJECTION, POWDER, FOR SOLUTION INTRAMUSCULAR; INTRAVENOUS at 18:42

## 2025-03-26 RX ADMIN — POTASSIUM CHLORIDE 40 MEQ: 1.5 POWDER, FOR SOLUTION ORAL at 18:42

## 2025-03-26 RX ADMIN — SODIUM CHLORIDE, SODIUM LACTATE, POTASSIUM CHLORIDE, AND CALCIUM CHLORIDE 1000 ML: 600; 310; 30; 20 INJECTION, SOLUTION INTRAVENOUS at 16:46

## 2025-03-26 RX ADMIN — Medication 10 ML: at 22:30

## 2025-03-26 RX ADMIN — SORBITOL SOLUTION (BULK) 30 ML: 70 SOLUTION at 22:29

## 2025-03-26 RX ADMIN — ENOXAPARIN SODIUM 40 MG: 100 INJECTION SUBCUTANEOUS at 22:28

## 2025-03-26 NOTE — TELEPHONE ENCOUNTER
Caller: Kayla Huber    Relationship to patient: Self    Best call back number: [phone]    Patient is needing: PATIENT CALLED STATING SHE'S IN THE HOSPITAL AT [facility] FOR  PNEUMONIA BUT THEY'RE MOVING HER TO [place] BECAUSE HER LIVER ENZYMES ARE HIGH.
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
## 2025-03-26 NOTE — PLAN OF CARE
Goal Outcome Evaluation:  Plan of Care Reviewed With: patient        Progress: no change  Outcome Evaluation: Pt is a 53 y/o F w hx of hepatic steatosis, etoh abuse, chronic vertigo, depression, anxiety, tobacco use, chronic back/left shoulder pain who presented to [facility] on 3/26/2025 as a transfer from [facility] for pneumonia/elevated liver enzymes. Pt went to see her PCP 3/20 for hospital follow up, at that time did report some SOA/cough/wheeze and was started on prednisone course but cont to have worsening symptoms, also reports fevers/chills/sore throat. She was seen at Urgent Care 3/25, found to be satting mid 80s, sent to [facility]. Diagnosed with sepsis/pneumonia there, started on abx with azithromycin and ceftriaxone. 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 w/ ramp entrance and was MOD I with intermittent use of RW in home, used scooter at grocery store, reports 1 syncopal fall 2 weeks ago striking head, still drives, independent all ADLs. During eval, pt demonstrates MOD I with bed mobility, requires supervision for transfers and supervision for gait training 100 ft with no AD, demonstrating ataxic gait with wide BINA, reduced earl. Pt with hx of BPPV (has been treated in OP PT) but not currently experiencing signs/symptoms of vertigo and with peripheral neurpathy BLE distal to knees. Pt appears to be functioning close to baseline, would benefit from OP PT to address balance/proprioception/BPPV but with no skilled acute care PT needs at this time. Eval only. PPE: gloves, mask, gown    Anticipated Discharge Disposition (PT): home with outpatient therapy services
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
## 2025-03-27 ENCOUNTER — INPATIENT HOSPITAL (OUTPATIENT)
Dept: URBAN - METROPOLITAN AREA HOSPITAL 84 | Facility: HOSPITAL | Age: 55
End: 2025-03-27
Payer: COMMERCIAL

## 2025-03-27 ENCOUNTER — APPOINTMENT (OUTPATIENT)
Dept: INFUSION THERAPY | Facility: HOSPITAL | Age: 55
End: 2025-03-27
Payer: MEDICAID

## 2025-03-27 DIAGNOSIS — D69.6 THROMBOCYTOPENIA, UNSPECIFIED: ICD-10-CM

## 2025-03-27 DIAGNOSIS — R74.01 ELEVATION OF LEVELS OF LIVER TRANSAMINASE LEVELS: ICD-10-CM

## 2025-03-27 DIAGNOSIS — R11.2 NAUSEA WITH VOMITING, UNSPECIFIED: ICD-10-CM

## 2025-03-27 DIAGNOSIS — D53.9 NUTRITIONAL ANEMIA, UNSPECIFIED: ICD-10-CM

## 2025-03-27 DIAGNOSIS — K59.00 CONSTIPATION, UNSPECIFIED: ICD-10-CM

## 2025-03-27 LAB
ALBUMIN SERPL-MCNC: 3.5 G/DL (ref 3.5–5.2)
ALBUMIN/GLOB SERPL: 1.5 G/DL
ALP SERPL-CCNC: 273 U/L (ref 39–117)
ALT SERPL W P-5'-P-CCNC: 149 U/L (ref 1–33)
ANION GAP SERPL CALCULATED.3IONS-SCNC: 11.1 MMOL/L (ref 5–15)
AST SERPL-CCNC: 322 U/L (ref 1–32)
BASOPHILS # BLD AUTO: 0.02 10*3/MM3 (ref 0–0.2)
BASOPHILS NFR BLD AUTO: 0.3 % (ref 0–1.5)
BILIRUB SERPL-MCNC: 1 MG/DL (ref 0–1.2)
BUN SERPL-MCNC: 6 MG/DL (ref 6–20)
BUN/CREAT SERPL: 11.8 (ref 7–25)
CALCIUM SPEC-SCNC: 8.5 MG/DL (ref 8.6–10.5)
CERULOPLASMIN SERPL-MCNC: 20 MG/DL (ref 19–39)
CHLORIDE SERPL-SCNC: 102 MMOL/L (ref 98–107)
CO2 SERPL-SCNC: 21.9 MMOL/L (ref 22–29)
CREAT SERPL-MCNC: 0.51 MG/DL (ref 0.57–1)
D-LACTATE SERPL-SCNC: 1.9 MMOL/L (ref 0.5–2)
DEPRECATED RDW RBC AUTO: 62.1 FL (ref 37–54)
EGFRCR SERPLBLD CKD-EPI 2021: 111.8 ML/MIN/1.73
EOSINOPHIL # BLD AUTO: 0.01 10*3/MM3 (ref 0–0.4)
EOSINOPHIL NFR BLD AUTO: 0.2 % (ref 0.3–6.2)
ERYTHROCYTE [DISTWIDTH] IN BLOOD BY AUTOMATED COUNT: 17 % (ref 12.3–15.4)
FOLATE SERPL-MCNC: 4.35 NG/ML (ref 4.78–24.2)
GGT SERPL-CCNC: 1409 U/L (ref 5–36)
GLOBULIN UR ELPH-MCNC: 2.4 GM/DL
GLUCOSE SERPL-MCNC: 139 MG/DL (ref 65–99)
HAPTOGLOB SERPL-MCNC: 232 MG/DL (ref 30–200)
HCT VFR BLD AUTO: 37 % (ref 34–46.6)
HGB BLD-MCNC: 11.5 G/DL (ref 12–15.9)
IGA1 MFR SER: 171 MG/DL (ref 70–400)
IGG1 SER-MCNC: 699 MG/DL (ref 700–1600)
IGM SERPL-MCNC: 129 MG/DL (ref 40–230)
IMM GRANULOCYTES # BLD AUTO: 0.18 10*3/MM3 (ref 0–0.05)
IMM GRANULOCYTES NFR BLD AUTO: 2.7 % (ref 0–0.5)
LDH SERPL-CCNC: 384 U/L (ref 135–214)
LYMPHOCYTES # BLD AUTO: 1.44 10*3/MM3 (ref 0.7–3.1)
LYMPHOCYTES NFR BLD AUTO: 21.7 % (ref 19.6–45.3)
MCH RBC QN AUTO: 31.2 PG (ref 26.6–33)
MCHC RBC AUTO-ENTMCNC: 31.1 G/DL (ref 31.5–35.7)
MCV RBC AUTO: 100.3 FL (ref 79–97)
MONOCYTES # BLD AUTO: 0.54 10*3/MM3 (ref 0.1–0.9)
MONOCYTES NFR BLD AUTO: 8.1 % (ref 5–12)
NEUTROPHILS NFR BLD AUTO: 4.45 10*3/MM3 (ref 1.7–7)
NEUTROPHILS NFR BLD AUTO: 67 % (ref 42.7–76)
NRBC BLD AUTO-RTO: 0.3 /100 WBC (ref 0–0.2)
PLATELET # BLD AUTO: 131 10*3/MM3 (ref 140–450)
PMV BLD AUTO: 10.2 FL (ref 6–12)
POTASSIUM SERPL-SCNC: 4.3 MMOL/L (ref 3.5–5.2)
PROT SERPL-MCNC: 5.9 G/DL (ref 6–8.5)
RBC # BLD AUTO: 3.69 10*6/MM3 (ref 3.77–5.28)
SODIUM SERPL-SCNC: 135 MMOL/L (ref 136–145)
VIT B12 BLD-MCNC: 542 PG/ML (ref 211–946)
WBC NRBC COR # BLD AUTO: 6.64 10*3/MM3 (ref 3.4–10.8)

## 2025-03-27 PROCEDURE — 80053 COMPREHEN METABOLIC PANEL: CPT | Performed by: STUDENT IN AN ORGANIZED HEALTH CARE EDUCATION/TRAINING PROGRAM

## 2025-03-27 PROCEDURE — 94799 UNLISTED PULMONARY SVC/PX: CPT

## 2025-03-27 PROCEDURE — 83605 ASSAY OF LACTIC ACID: CPT | Performed by: STUDENT IN AN ORGANIZED HEALTH CARE EDUCATION/TRAINING PROGRAM

## 2025-03-27 PROCEDURE — 25010000002 CEFTRIAXONE PER 250 MG: Performed by: STUDENT IN AN ORGANIZED HEALTH CARE EDUCATION/TRAINING PROGRAM

## 2025-03-27 PROCEDURE — 99232 SBSQ HOSP IP/OBS MODERATE 35: CPT

## 2025-03-27 PROCEDURE — 94664 DEMO&/EVAL PT USE INHALER: CPT

## 2025-03-27 PROCEDURE — 25010000002 ENOXAPARIN PER 10 MG: Performed by: STUDENT IN AN ORGANIZED HEALTH CARE EDUCATION/TRAINING PROGRAM

## 2025-03-27 PROCEDURE — 82746 ASSAY OF FOLIC ACID SERUM: CPT

## 2025-03-27 PROCEDURE — 85025 COMPLETE CBC W/AUTO DIFF WBC: CPT | Performed by: STUDENT IN AN ORGANIZED HEALTH CARE EDUCATION/TRAINING PROGRAM

## 2025-03-27 PROCEDURE — 25010000002 MORPHINE PER 10 MG: Performed by: STUDENT IN AN ORGANIZED HEALTH CARE EDUCATION/TRAINING PROGRAM

## 2025-03-27 PROCEDURE — 83615 LACTATE (LD) (LDH) ENZYME: CPT

## 2025-03-27 PROCEDURE — 92610 EVALUATE SWALLOWING FUNCTION: CPT

## 2025-03-27 PROCEDURE — 82607 VITAMIN B-12: CPT

## 2025-03-27 PROCEDURE — 25010000002 ONDANSETRON PER 1 MG: Performed by: STUDENT IN AN ORGANIZED HEALTH CARE EDUCATION/TRAINING PROGRAM

## 2025-03-27 PROCEDURE — 94761 N-INVAS EAR/PLS OXIMETRY MLT: CPT

## 2025-03-27 RX ORDER — ACETAMINOPHEN 500 MG
1000 TABLET ORAL EVERY 8 HOURS PRN
Status: DISCONTINUED | OUTPATIENT
Start: 2025-03-27 | End: 2025-03-31 | Stop reason: HOSPADM

## 2025-03-27 RX ORDER — SACCHAROMYCES BOULARDII 250 MG
250 CAPSULE ORAL 2 TIMES DAILY
Status: DISCONTINUED | OUTPATIENT
Start: 2025-03-27 | End: 2025-03-31 | Stop reason: HOSPADM

## 2025-03-27 RX ORDER — CODEINE PHOSPHATE AND GUAIFENESIN 10; 100 MG/5ML; MG/5ML
10 SOLUTION ORAL EVERY 6 HOURS PRN
Status: DISCONTINUED | OUTPATIENT
Start: 2025-03-27 | End: 2025-03-30

## 2025-03-27 RX ADMIN — DULOXETINE 120 MG: 30 CAPSULE, DELAYED RELEASE ORAL at 08:47

## 2025-03-27 RX ADMIN — Medication 10 ML: at 21:12

## 2025-03-27 RX ADMIN — ENOXAPARIN SODIUM 40 MG: 100 INJECTION SUBCUTANEOUS at 17:01

## 2025-03-27 RX ADMIN — Medication 250 MG: at 21:11

## 2025-03-27 RX ADMIN — Medication 250 MG: at 08:47

## 2025-03-27 RX ADMIN — IPRATROPIUM BROMIDE AND ALBUTEROL SULFATE 3 ML: .5; 3 SOLUTION RESPIRATORY (INHALATION) at 11:25

## 2025-03-27 RX ADMIN — ACETAMINOPHEN 1000 MG: 500 TABLET, FILM COATED ORAL at 21:11

## 2025-03-27 RX ADMIN — AMITRIPTYLINE HYDROCHLORIDE 75 MG: 50 TABLET, FILM COATED ORAL at 21:12

## 2025-03-27 RX ADMIN — MORPHINE SULFATE 2 MG: 2 INJECTION, SOLUTION INTRAMUSCULAR; INTRAVENOUS at 00:31

## 2025-03-27 RX ADMIN — GUAIFENESIN AND CODEINE PHOSPHATE 10 ML: 100; 10 SOLUTION ORAL at 22:26

## 2025-03-27 RX ADMIN — ONDANSETRON 4 MG: 2 INJECTION, SOLUTION INTRAMUSCULAR; INTRAVENOUS at 00:31

## 2025-03-27 RX ADMIN — Medication 10 ML: at 08:48

## 2025-03-27 RX ADMIN — CEFTRIAXONE 2000 MG: 2 INJECTION, POWDER, FOR SOLUTION INTRAMUSCULAR; INTRAVENOUS at 17:01

## 2025-03-27 NOTE — PLAN OF CARE
"Goal Outcome Evaluation:      Pt was seen for skilled ST targeting clinical evaluation of swallow at bedside. Pt alert and reclined in bed upon entry. Pt on 2L nasal cannula. Oriented x4. Oral mechanism examination completed, which revealed dentition WFL, reduced lingual ROM, generalized weakness. Diadochokinetics WFL; voice mildly rough quality. SLP provided the following: x5 drinks thin by straw; regular x1; Pt exhibited adequate labial seal and functional rotary mastication with no overt s/sx during intake of PO; clear voice after swallow; clear oral cavity after swallow; no overt s/sx of aspiration; complaints of globus. Pt with general complaints of symptoms consistent with esophageal dysfunction. She endorses regularly experiencing globus sensation, stating \"food gets stuck.\" She recounts drinking thin liquids and regurgitating them after completion of swallow to the point where PO exits through oral and nasal cavities. Pt also states hx of esophageal dilation. Per GI note, reported endo history is as follows: \"EGD/colonoscopy in 10/2024 by Dr. Swan-patient is unsure of results.  11/2017 EGD (Dr. New)-grade B erosive esophagitis.  S/p dilation to 48 K[address]anda.  Benign esophageal polyp.\" Based on pt's WFL baseline mentation and performance, it is recommended that she resume a regular solid/ thin liquid diet. From ST standpoint, this pt does not present with concerns related to oropharyngeal dysphagia. It is suspected pt's esophageal dysfunction is her primary swallow issue; therefore, a consult to GI is indicated.    Recommend:   - Regular solids/ thin liquids.   - GI consult for esophageal concerns.  - ST will follow peripherally to ensure continued tolerance to diet.  - Please follow general safe swallow precautions (upright during/after meals, slow rate, small bites/sips, and alternate solids/liquids).             Anticipated Discharge Disposition (SLP): unknown          SLP Swallowing Diagnosis: functional " oral phase, functional pharyngeal phase, suspected esophageal dysphagia (03/27/25 1000)
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
## 2025-03-27 NOTE — NURSING NOTE
After performing an [employer] Mike Sr APNP determined there wasn't enough ascites to safely do a paracentesis.

## 2025-03-27 NOTE — CASE MANAGEMENT/SOCIAL WORK
Discharge Planning Assessment  [facility]     Patient Name: Kayla Huber  MRN: [id number]  Today's Date: 3/27/2025    Admit Date: 3/26/2025    Plan: Return home with mother who she cares for. OP PT recommended.   Discharge Needs Assessment       Row Name 03/27/25 1084       Living Environment    People in Home parent(s)    Name(s) of People in Home mother, Rose and she cares for her full time    Current Living Arrangements home    Primary Care Provided by self    Provides Primary Care For parent(s)    Caregiving Concerns Brother staying with the mother    Family Caregiver if Needed none    Quality of Family Relationships supportive    Able to Return to Prior Arrangements yes       Resource/Environmental Concerns    Resource/Environmental Concerns none    Transportation Concerns none       Transition Planning    Patient/Family Anticipates Transition to home with family    Patient/Family Anticipated Services at Transition rehabilitation services    Transportation Anticipated family or friend will provide       Discharge Needs Assessment    Readmission Within the Last 30 Days no previous admission in last 30 days    Equipment Currently Used at Home nebulizer;walker, rolling    Concerns to be Addressed discharge planning    Do you want help finding or keeping work or a job? I do not need or want help    Do you want help with school or training? For example, starting or completing job training or getting a high school diploma, GED or equivalent No    Anticipated Changes Related to Illness none    Equipment Needed After Discharge none                   Discharge Plan       Row Name 03/27/25 7421       Plan    Plan Return home with mother who she cares for. OP PT recommended.    Plan Comments Barriers: IV antibiotics. GI following. CM met with Ms. Huber who is a/o and said she lives with her mother who she cares for full time. 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 PCP and Pharmacy  confirmed. SDOH completed and she denied financial concerns. She drives and is independent and uses a nebulizer and rolling walker. She agrees to go to OP PT at [facility] in [place] and orders will need to be sent once ordered.  Her brother is caring for her mother while she is in the hospital                    Expected Discharge Date and Time       Expected Discharge Date Expected Discharge Time    Mar 28, 2025            Demographic Summary       Row Name 03/27/25 1527       General Information    Admission Type inpatient    Arrived From emergency department    Required Notices Provided Important Message from Medicare    Referral Source admission list    Reason for Consult discharge planning    Preferred Language English       Contact Information    Permission Granted to Share Info With                    Functional Status       Row Name 03/27/25 1532       Functional Status    Usual Activity Tolerance good    Current Activity Tolerance moderate       Functional Status, IADL    Medications independent    Meal Preparation independent    Housekeeping independent    Laundry independent    Shopping independent    If for any reason you need help with day-to-day activities such as bathing, preparing meals, shopping, managing finances, etc., do you get the help you need? 

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
## 2025-03-27 NOTE — PLAN OF CARE
Problem: Adult Inpatient Plan of Care  Goal: Plan of Care Review  Outcome: Progressing  Goal: Patient-Specific Goal (Individualized)  Outcome: Progressing  Goal: Absence of Hospital-Acquired Illness or Injury  Outcome: Progressing  Intervention: Identify and Manage Fall Risk  Recent Flowsheet Documentation  Taken 3/27/2025 0407 by Franc Kwon RN  Safety Promotion/Fall Prevention: safety round/check completed  Taken 3/27/2025 0200 by Franc Kwon RN  Safety Promotion/Fall Prevention:   safety round/check completed   room organization consistent   lighting adjusted   fall prevention program maintained   clutter free environment maintained   assistive device/personal items within reach   activity supervised  Taken 3/27/2025 0023 by Franc Kwon RN  Safety Promotion/Fall Prevention:   safety round/check completed   room organization consistent   lighting adjusted   fall prevention program maintained   clutter free environment maintained   assistive device/personal items within reach   activity supervised  Taken 3/26/2025 2236 by Doyle, Franc, RN  Safety Promotion/Fall Prevention:   safety round/check completed   lighting adjusted   fall prevention program maintained   clutter free environment maintained   assistive device/personal items within reach   activity supervised  Taken 3/26/2025 2000 by Doyle, Franc, RN  Safety Promotion/Fall Prevention:   safety round/check completed   room organization consistent   lighting adjusted   fall prevention program maintained   clutter free environment maintained   assistive device/personal items within reach   activity supervised  Intervention: Prevent Skin Injury  Recent Flowsheet Documentation  Taken 3/27/2025 0407 by Franc Kwon RN  Body Position: position changed independently  Taken 3/27/2025 0023 by Franc Kwon RN  Body Position: position changed independently  Skin Protection: transparent dressing maintained  Taken  3/26/2025 2000 by Franc Kwon RN  Body Position: position changed independently  Skin Protection: transparent dressing maintained  Intervention: Prevent Infection  Recent Flowsheet Documentation  Taken 3/27/2025 0407 by Franc Kwon RN  Infection Prevention: single patient room provided  Taken 3/27/2025 0200 by Franc Kwon RN  Infection Prevention: single patient room provided  Taken 3/27/2025 0023 by Franc Kwon RN  Infection Prevention: single patient room provided  Taken 3/26/2025 2236 by Franc Kwon RN  Infection Prevention: single patient room provided  Taken 3/26/2025 2000 by Franc Kwon RN  Infection Prevention: hand hygiene promoted  Goal: Optimal Comfort and Wellbeing  Outcome: Progressing  Intervention: Monitor Pain and Promote Comfort  Recent Flowsheet Documentation  Taken 3/27/2025 0031 by Franc Kwon RN  Pain Management Interventions: pain medication given  Taken 3/26/2025 2000 by Franc Kwon RN  Pain Management Interventions:   pain management plan reviewed with patient/caregiver   care clustered   quiet environment facilitated  Goal: Readiness for Transition of Care  Outcome: Progressing     Problem: Skin Injury Risk Increased  Goal: Skin Health and Integrity  Outcome: Progressing  Intervention: Optimize Skin Protection  Recent Flowsheet Documentation  Taken 3/27/2025 0407 by Franc Kwon RN  Head of Bed (HOB) Positioning: HOB elevated  Taken 3/27/2025 0023 by Franc Kwon RN  Pressure Reduction Techniques: frequent weight shift encouraged  Head of Bed (HOB) Positioning: HOB elevated  Pressure Reduction Devices: pressure-redistributing mattress utilized  Skin Protection: transparent dressing maintained  Taken 3/26/2025 2000 by Franc Kwon RN  Pressure Reduction Techniques: frequent weight shift encouraged  Head of Bed (HOB) Positioning: HOB elevated  Pressure Reduction Devices: pressure-redistributing  mattress utilized  Skin Protection: transparent dressing maintained     Problem: Fall Injury Risk  Goal: Absence of Fall and Fall-Related Injury  Outcome: Progressing  Intervention: Identify and Manage Contributors  Recent Flowsheet Documentation  Taken 3/27/2025 0407 by Franc Kwon RN  Medication Review/Management: medications reviewed  Taken 3/27/2025 0200 by Franc Kwon RN  Medication Review/Management: medications reviewed  Taken 3/27/2025 0023 by Franc Kwon RN  Medication Review/Management: medications reviewed  Taken 3/26/2025 2236 by Franc Kwon RN  Medication Review/Management: medications reviewed  Taken 3/26/2025 2000 by Franc Kwon RN  Medication Review/Management: medications reviewed  Intervention: Promote Injury-Free Environment  Recent Flowsheet Documentation  Taken 3/27/2025 0407 by Franc Kwon RN  Safety Promotion/Fall Prevention: safety round/check completed  Taken 3/27/2025 0200 by Franc Kwon RN  Safety Promotion/Fall Prevention:   safety round/check completed   room organization consistent   lighting adjusted   fall prevention program maintained   clutter free environment maintained   assistive device/personal items within reach   activity supervised  Taken 3/27/2025 0023 by Franc Kwon RN  Safety Promotion/Fall Prevention:   safety round/check completed   room organization consistent   lighting adjusted   fall prevention program maintained   clutter free environment maintained   assistive device/personal items within reach   activity supervised  Taken 3/26/2025 2236 by Franc Kwon, RN  Safety Promotion/Fall Prevention:   safety round/check completed   lighting adjusted   fall prevention program maintained   clutter free environment maintained   assistive device/personal items within reach   activity supervised  Taken 3/26/2025 2000 by Franc Kwon, RN  Safety Promotion/Fall Prevention:   safety round/check  completed   room organization consistent   lighting adjusted   fall prevention program maintained   clutter free environment maintained   assistive device/personal items within reach   activity supervised     Problem: Pneumonia  Goal: Fluid Balance  Outcome: Progressing  Goal: Absence of Infection Signs and Symptoms  Outcome: Progressing  Intervention: Prevent Infection Progression  Recent Flowsheet Documentation  Taken 3/27/2025 0407 by Franc Kwon RN  Isolation Precautions: precautions maintained  Taken 3/27/2025 0200 by Franc Kwon RN  Isolation Precautions: precautions maintained  Taken 3/27/2025 0023 by Franc Kwon RN  Isolation Precautions: precautions maintained  Taken 3/26/2025 2236 by Franc Kwon RN  Isolation Precautions: precautions maintained  Taken 3/26/2025 2000 by Franc Kwon RN  Isolation Precautions: precautions maintained  Goal: Effective Oxygenation and Ventilation  Outcome: Progressing  Intervention: Promote Airway Secretion Clearance  Recent Flowsheet Documentation  Taken 3/27/2025 0407 by Franc Kwon RN  Cough And Deep Breathing: done independently per patient  Taken 3/26/2025 2000 by Franc Kwon RN  Cough And Deep Breathing: done independently per patient  Intervention: Optimize Oxygenation and Ventilation  Recent Flowsheet Documentation  Taken 3/27/2025 0407 by Franc Kwon RN  Head of Bed (HOB) Positioning: HOB elevated  Taken 3/27/2025 0023 by Franc Kwon RN  Head of Bed (HOB) Positioning: HOB elevated  Taken 3/26/2025 2000 by Franc Kwon RN  Head of Bed (HOB) Positioning: HOB elevated   Goal Outcome Evaluation:

## 2025-03-27 NOTE — CASE MANAGEMENT/SOCIAL WORK
Continued Stay Note  [facility]     Patient Name: Kayla Huber  MRN: [id number]  Today's Date: 3/27/2025    Admit Date: 3/26/2025    Plan: Needs CM interview   Discharge Plan       Row Name 03/27/25 1010       Plan    Plan Needs CM interview    Plan Comments Barriers: CM attempted interview and SDOH but SLP doing bedside swallow evaluation.  Bedside Paracentesis ordered. IV antibiotics. 2 liters of Oxygen. GI consult.  CM will need to follow up for discharge planning                               Eleanor CHAIDEZ,RN Case Manager  [facility]  Phone: Desk- [phone] cell- [phone]

## 2025-03-27 NOTE — PROGRESS NOTES
LOS: 1 day   Patient Care Team:  Chantal Benitez DO as PCP - General (Family Medicine)  Eric Magaña Jr., DPM as Consulting Physician (Podiatry)  Brody Santo MD as Consulting Physician (Obstetrics and Gynecology)  Ryan Guzmán MD as Consulting Physician (Hematology and Oncology)  Dimitris Manriquez MD as Consulting Physician (Cardiology)  Neil Swan MD as Consulting Physician (Gastroenterology)      Subjective     Interval History:     Subjective: Patient reports feeling better this morning.  Denies abdominal pain, nausea, or vomiting.  Reports tolerating dinner last night with no issues.      ROS:   No chest pain, shortness of breath, or cough.  No abdominal pain, nausea, or vomiting       Medication Review:     Current Facility-Administered Medications:     amitriptyline (ELAVIL) tablet 75 mg, 75 mg, Oral, Nightly, Tej Hurt MD, 75 mg at 03/26/25 2229    sennosides-docusate (PERICOLACE) 8.6-50 MG per tablet 2 tablet, 2 tablet, Oral, BID PRN **AND** polyethylene glycol (MIRALAX) packet 17 g, 17 g, Oral, Daily PRN **AND** bisacodyl (DULCOLAX) EC tablet 5 mg, 5 mg, Oral, Daily PRN **AND** bisacodyl (DULCOLAX) suppository 10 mg, 10 mg, Rectal, Daily PRN, Raman Hernandez MD    Calcium Replacement - Follow Nurse / BPA Driven Protocol, , Not Applicable, PRN, Raman Hernandez MD    cefTRIAXone (ROCEPHIN) 2,000 mg in sodium chloride 0.9 % 100 mL MBP, 2,000 mg, Intravenous, Q24H, Raman Hernandez MD, Last Rate: 200 mL/hr at 03/26/25 1842, 2,000 mg at 03/26/25 1842    DULoxetine (CYMBALTA) DR capsule 120 mg, 120 mg, Oral, Daily, Tej Hurt MD, 120 mg at 03/27/25 0847    enoxaparin sodium (LOVENOX) syringe 40 mg, 40 mg, Subcutaneous, Daily, Raman Hernandez MD, 40 mg at 03/26/25 2228    ipratropium-albuterol (DUO-NEB) nebulizer solution 3 mL, 3 mL, Nebulization, Q6H PRN, Raman Hernandez MD, 3 mL at 03/27/25 1125    Magnesium Standard Dose Replacement - Follow  Nurse / BPA Driven Protocol, , Not Applicable, David SINGER Nicholas T, MD    nitroglycerin (NITROSTAT) SL tablet 0.4 mg, 0.4 mg, Sublingual, Q5 Min PRN, Raman Hernandez MD    ondansetron (ZOFRAN) injection 4 mg, 4 mg, Intravenous, Q6H PRN, Tej Hurt MD, 4 mg at 03/27/25 0031    Phosphorus Replacement - Follow Nurse / BPA Driven Protocol, , Not Applicable, PRDavid LUNA Nicholas T, MD    Potassium Replacement - Follow Nurse / BPA Driven Protocol, , Not Applicable, David SINGER Nicholas T, MD    saccharomyces boulardii (FLORASTOR) capsule 250 mg, 250 mg, Oral, BID, Flo Casey MD, 250 mg at 03/27/25 0847    sodium chloride 0.9 % flush 10 mL, 10 mL, Intravenous, Q12H, Raman Hernandez MD, 10 mL at 03/27/25 0848    sodium chloride 0.9 % flush 10 mL, 10 mL, Intravenous, PRN, Raman Hernandez MD    sodium chloride 0.9 % infusion 40 mL, 40 mL, Intravenous, PRDavid LUNA Nicholas T, MD    sorbitol solution 30 mL, 30 mL, Oral, Daily, Jennifer Adams, APRN, 30 mL at 03/26/25 2229      Objective     Vital Signs  Vitals:    03/27/25 0717 03/27/25 1117 03/27/25 1125 03/27/25 1129   BP: 147/91 159/83     BP Location: Left arm Right arm     Patient Position: Lying Lying     Pulse: 93 95 103 94   Resp: 17 21 18 12   Temp: 98 °F (36.7 °C) 98.3 °F (36.8 °C)     TempSrc: Oral Oral     SpO2: 92% 92% 90% 99%   Weight:       Height:           Physical Exam:     General Appearance:  Laying in bed, overweight, awake and alert, in no acute distress   Head:    Normocephalic, without obvious abnormality   Eyes:          Conjunctivae normal, anicteric sclera       Neck:   No adenopathy, supple, no JVD   Lungs:     respirations regular, even and unlabored, 2 L nasal cannula   Abdomen:     Soft, non-tender, no rebound or guarding, non-distended       Extremities:   No edema, no cyanosis   Skin:   No bruising or rash, no jaundice        Results Review:    Results from last 7 days   Lab Units 03/27/25  0049 03/26/25  1500   WBC 10*3/mm3  6.64 5.73   HEMOGLOBIN g/dL 11.5* 10.9*   PLATELETS 10*3/mm3 131* 127*     Results from last 7 days   Lab Units 03/27/25  0049 03/26/25  1500   SODIUM mmol/L 135* 138   POTASSIUM mmol/L 4.3 3.3*   CHLORIDE mmol/L 102 102   CO2 mmol/L 21.9* 23.8   BUN mg/dL 6 7   CREATININE mg/dL 0.51* 0.54*   GLUCOSE mg/dL 139* 106*   ALBUMIN g/dL 3.5 3.4*   BILIRUBIN mg/dL 1.0 0.9   ALK PHOS U/L 273* 274*   AST (SGOT) U/L 322* 360*   ALT (SGPT) U/L 149* 157*   INR   --  1.10     Estimated Creatinine Clearance: 149.4 mL/min (A) (by C-G formula based on SCr of 0.51 mg/dL (L)).  Lab Results   Component Value Date    HGBA1C 5.6 07/09/2024         Infection     UA      Microbiology Results (last 10 days)       Procedure Component Value - Date/Time    S. Pneumo Ag Urine or CSF - Urine, Urine, Clean Catch [[id number]]  (Normal) Collected: 03/26/25 1810    Lab Status: Final result Specimen: Urine, Clean Catch Updated: 03/26/25 1858     Strep Pneumo Ag Negative    Legionella Antigen, Urine - Urine, Urine, Clean Catch [[id number]]  (Normal) Collected: 03/26/25 1810    Lab Status: Final result Specimen: Urine, Clean Catch Updated: 03/26/25 1858     LEGIONELLA ANTIGEN, URINE Negative    Respiratory Panel PCR w/COVID-19(SARS-CoV-2) ZOILA/CHAZ/VIJAY/PAD/COR/PERI In-House, NP Swab in UTM/VTM, 2 HR TAT - Swab, Nasopharynx [[id number]]  (Abnormal) Collected: 03/26/25 1455    Lab Status: Final result Specimen: Swab from Nasopharynx Updated: 03/26/25 1652     ADENOVIRUS, PCR Not Detected     Coronavirus 229E Not Detected     Coronavirus HKU1 Not Detected     Coronavirus NL63 Not Detected     Coronavirus OC43 Not Detected     COVID19 Not Detected     Human Metapneumovirus Detected     Human Rhinovirus/Enterovirus Not Detected     Influenza A PCR Not Detected     Influenza B PCR Not Detected     Parainfluenza Virus 1 Not Detected     Parainfluenza Virus 2 Not Detected     Parainfluenza Virus 3 Not Detected     Parainfluenza Virus 4 Not Detected     RSV, PCR Not  Detected     Bordetella pertussis pcr Not Detected     Bordetella parapertussis PCR Not Detected     Chlamydophila pneumoniae PCR Not Detected     Mycoplasma pneumo by PCR Not Detected    Narrative:      In the setting of a positive respiratory panel with a viral infection PLUS a negative procalcitonin without other underlying concern for bacterial infection, consider observing off antibiotics or discontinuation of antibiotics and continue supportive care. If the respiratory panel is positive for atypical bacterial infection (Bordetella pertussis, Chlamydophila pneumoniae, or Mycoplasma pneumoniae), consider antibiotic de-escalation to target atypical bacterial infection.          Imaging Results (Last 72 Hours)       Procedure Component Value Units Date/Time    XR Abdomen KUB [[id number]] Collected: 03/26/25 1840     Updated: 03/26/25 1847    Narrative:      XR ABDOMEN KUB    Date of Exam: 3/26/2025 6:20 PM EDT    Indication: Constipation, abdominal distention    Comparison: CT abdomen and pelvis dated 3/25/2025    Findings:  There is hepatomegaly. There is a nonspecific nonobstructive bowel gas pattern. There is a mild colonic stool burden. No visible renal calcifications. There is a right intramedullary nail.      Impression:      Impression:  1.Nonspecific nonobstructive bowel gas pattern. Mild colonic stool burden.  2.Hepatomegaly.        Electronically Signed: Alcon Odonnell    3/26/2025 6:44 PM EDT    Workstation ID: [id number]    US Abdomen Limited [[id number]] Collected: 03/26/25 1606     Updated: 03/26/25 1610    Narrative:      US ABDOMEN LIMITED    Date of Exam: 3/26/2025 3:48 PM EDT    Indication: Elevated liver enzymes.    Comparison: CT abdomen and pelvis 3/25/2025, ultrasound abdomen 12/5/2024    Technique: Grayscale and color Doppler ultrasound evaluation of the right upper quadrant was performed.      Findings:  LIVER: Mild increased hepatic parenchymal echogenicity most frequently seen with fatty  infiltration. The liver is enlarged, measuring 21 cm in length. No solid lesions identified. Normal flow is present within the hepatic vasculature.     GALLBLADDER:  The gallbladder appears normal in size with no focal lesions. No evidence of stones, wall thickening or pericholecystic fluid. The common bile duct appears normal in caliber.  No positive sonographic Jones sign reported.    PANCREAS:  Visualized portions are unremarkable.    RIGHT KIDNEY:  Normal in size with no solid lesions. No evidence of nephrolithiasis or hydronephrosis.          Impression:      Impression:  Enlarged liver with increased hepatic parenchymal echogenicity most frequently seen with fatty infiltration.        Electronically Signed: Shreyas Herr MD    3/26/2025 4:08 PM EDT    Workstation ID: [id number]            Assessment & Plan   Patient is a 54-year-old female who presented to [facility] on 3/26/2025 for shortness of air and cough with fevers and sore throat.  Patient was transferred to [facility] same-day for further management of sepsis/pneumonia.  GI was consulted for elevated LFTs.      WBC 6.64, hemoglobin 11.5, .3, platelet 131  Alk phos 273, , , total bilirubin 1.0    Lactate normal at 1.9  Folate 4.25 (L)  GGT 1,409  Iron profile and vitamin B12 unremarkable    Acute hepatitis panel, immunoglobulins, acetaminophen and salicylate levels unremarkable.    KUB-nonspecific nonobstructive bowel gas pattern.  Mild colonic stool burden.  Hepatomegaly.     RUQ US-enlarged liver with increased hepatic parenchymal echogenicity, most seen with fatty infiltration.  Normal CBD.     Reportedly at [facility]: Alk phos 246, , , total bilirubin 0.3 with CT abdomen/pelvis showing hepatic steatosis  Lactic acid 4.9  CTA chest-no evidence of PE.  Multifocal pneumonia.     5/2008 liver biopsy-severe steatosis and mild steatohepatitis        ASSESSMENT:  Elevated LFTs  Nausea with  vomiting  Constipation  Pneumonia  Macrocytic anemia  Thrombocytopenia.  Acute hypoxic respiratory failure     PLAN:  -Patient reportedly is followed by Dr. Swan outpatient for hepatic steatosis.  -RUQ US negative for choledocholithiasis.    -Possibly drug-related due to new atorvastatin use 2 weeks ago vs shock liver.  -Avoid hepatotoxic medications.  -Liver serologies negative thus far.  ASMA, M2 antibody, and JITENDRA pending.  Patient did have liver biopsy by Dr. Belcher in 2008 revealing severe steatosis and mild steatohepatitis.  Continue to trend LFTs.  -Hemoglobin stable.  Mild folic acid deficiency.  -Electrolyte replacement per primary care team.  -Recommend complete alcohol cessation.  -Supportive care.  -We will follow.        I discussed the patients findings and my recommendations with the patient.    Electronically signed by GER Suarez, 03/27/25, 1:53 PM EDT.

## 2025-03-27 NOTE — PROGRESS NOTES
[facility] MEDICINE SERVICE  DAILY PROGRESS NOTE    NAME: Kayla Huber  : 1970  MRN: [id number]      LOS: 1 day     PROVIDER OF SERVICE: Flo Casey MD    Chief Complaint: <principal problem not specified>    Subjective:     Interval History:  History taken from: patient    History of Present Illness: Kayla Huber is a 54 y.o. female with a CMH of Hepatic steatosis, Hx etoh abuse, chronic vertigo, depression, anxiety, Hx tobacco use, chronic back/left shoulder pain who presented to [facility] on 3/26/2025 as a transfer from [facility] for pneumonia/elevated liver enzymes. Pt went to see her PCP 3/20 for hospital follow up, at that time did report some SOA/cough/wheeze. Was started on prednisone course but cont to have worsening symptoms, also reports fevers/chills/sore throat. She was seen at Urgent Care , found to be satting mid 80s, sent to [facility]. Diagnosed with sepsis/pneumonia there, started on abx with azithromycin and ceftriaxone. Lactic elevated and initially uptrending, peak 4.9, most recently downtrending. Pt noted to have elevated liver enzymes, no GI available at facility. Discussed with GI Dr. Farmer here who agreed to consult, hospitalist service consulted for transfer.   She does have a diagnosis of hepatic steatosis from earlier this year, has been following with GI Dr. Beny shelton. Of note she was admitted to [place] 2024 for syncopal episode/hypotension/NSTEMI, did initially require pressors. Underwent C  for mildly elevated troponin and previous abn stress test, nonobstructive CAD, no PCI indicated. Started on aspirin/atorvastatin/metoprolol.   At present pt on 2L O2, reports continued cough/wheeze/SOA though improved from previous. Also has abdominal distention, states it has been distended for months but has been worse recently. Reports some upper abdominal pain/soreness primarily with coughing though also notes some RUQ  tenderness to palpation. Has had decr appetite recently w/ Sx of early satiety. She has a previous Hx daily etoh use, cut back about 5 years ago and presently drinks 1-2 drinks every month. Does state her last drink was 3/24 when she had a bloody loren.      3/27 seen in bed NAD, c/o abd distension, vss, .     Review of Systems  Constitutional:  Positive for appetite change, chills and fever. Negative for activity change.   HENT:  Positive for congestion and sore throat.    Eyes: Negative.    Respiratory:  Positive for cough, shortness of breath and wheezing.    Cardiovascular:  Negative for chest pain, palpitations and leg swelling.   Gastrointestinal:  Positive for abdominal distention, abdominal pain and nausea. Negative for blood in stool, constipation, diarrhea and vomiting.   Genitourinary:  Negative for dysuria, frequency and urgency.   Musculoskeletal:  Negative for arthralgias and myalgias.   Neurological:  Negative for dizziness, syncope, weakness, light-headedness and headaches.   Objective:     Vital Signs  Temp:  [98 °F (36.7 °C)-98.8 °F (37.1 °C)] 98 °F (36.7 °C)  Heart Rate:  [75-94] 93  Resp:  [14-18] 17  BP: (108-147)/(61-91) 147/91  Flow (L/min) (Oxygen Therapy):  [2] 2   Body mass index is 28.06 kg/m².    Physical Exam      General: She is not in acute distress.  HENT:      Head: Normocephalic and atraumatic.      Mouth/Throat:      Mouth: Mucous membranes are moist.      Pharynx: Oropharynx is clear.   Eyes:      Extraocular Movements: Extraocular movements intact.      Conjunctiva/sclera: Conjunctivae normal.      Pupils: Pupils are equal, round, and reactive to light.   Cardiovascular:      Rate and Rhythm: Normal rate and regular rhythm.      Pulses: Normal pulses.      Heart sounds: Normal heart sounds.   Pulmonary:      Breath sounds: Rhonchi present.      Comments: No incr wob on 2L NC  Minimal wheezing appreciated  Abdominal:      General: Bowel sounds are normal. There is  distension.      Palpations: Abdomen is soft.      Tenderness: There is no guarding or rebound.      Comments: RUQ mildly TTP   Musculoskeletal:         General: No swelling or tenderness.      Cervical back: Normal range of motion and neck supple.      Right lower leg: No edema.      Left lower leg: No edema.   Skin:     General: Skin is warm and dry.      Coloration: Skin is not jaundiced.   Neurological:      General: No focal deficit present.      Mental Status: She is alert and oriented to person, place, and time. Mental status is at baseline.        Diagnostic Data    Results from last 7 days   Lab Units 03/27/25  0049   WBC 10*3/mm3 6.64   HEMOGLOBIN g/dL 11.5*   HEMATOCRIT % 37.0   PLATELETS 10*3/mm3 131*   GLUCOSE mg/dL 139*   CREATININE mg/dL 0.51*   BUN mg/dL 6   SODIUM mmol/L 135*   POTASSIUM mmol/L 4.3   AST (SGOT) U/L 322*   ALT (SGPT) U/L 149*   ALK PHOS U/L 273*   BILIRUBIN mg/dL 1.0   ANION GAP mmol/L 11.1       XR Abdomen KUB  Result Date: 3/26/2025  Impression: 1.Nonspecific nonobstructive bowel gas pattern. Mild colonic stool burden. 2.Hepatomegaly. Electronically Signed: Alcon Odonnell  3/26/2025 6:44 PM EDT  Workstation ID: [id number]    US Abdomen Limited  Result Date: 3/26/2025  Impression: Enlarged liver with increased hepatic parenchymal echogenicity most frequently seen with fatty infiltration. Electronically Signed: Shreyas Herr MD  3/26/2025 4:08 PM EDT  Workstation ID: [id number]    Scheduled Meds:amitriptyline, 75 mg, Oral, Nightly  cefTRIAXone, 2,000 mg, Intravenous, Q24H  DULoxetine, 120 mg, Oral, Daily  enoxaparin sodium, 40 mg, Subcutaneous, Daily  saccharomyces boulardii, 250 mg, Oral, BID  sodium chloride, 10 mL, Intravenous, Q12H  sorbitol, 30 mL, Oral, Daily      Continuous Infusions:   PRN Meds:.  senna-docusate sodium **AND** polyethylene glycol **AND** bisacodyl **AND** bisacodyl    Calcium Replacement - Follow Nurse / BPA Driven Protocol    ipratropium-albuterol    Magnesium  Standard Dose Replacement - Follow Nurse / BPA Driven Protocol    nitroglycerin    ondansetron    Phosphorus Replacement - Follow Nurse / BPA Driven Protocol    Potassium Replacement - Follow Nurse / BPA Driven Protocol    sodium chloride    sodium chloride     I reviewed the patient's new clinical results.    Assessment/Plan:     Active and Resolved Problems  Active Hospital Problems    Diagnosis  POA    Pneumonia [J18.9]  Yes      Resolved Hospital Problems   No resolved problems to display.     Pneumonia-Human Metapneumovirus  Acute hypoxic respiratory failure  Sepsis   -- Lactic elevated at OSH to peak of 4.9, given fluids, downtrending. Repeat-1.9  - CTA Chest at OSH no evidence of PE, + multifocal pneumonia  - Initially requiring 6L O2, presently on 2L  - On albuterol at home, no diagnosis asthma/copd, +smoking Hx    - RVP Human Metapneumovirus  - BCx drawn at [facility] 2/25, f/u  - Cont abx with ceftriaxone, add azithromycin if indicated by RVP. Received abx already today.   - duonebs   - IVF as needed  - cont pulse ox  - wean O2 as able     Elevated liver enzymes  Hepatic steatosis  Hx etoh abuse  - at OSH AlkPh 246, , , Tbili 1.3. CTAP done showing hepatic steatosis, no obstruction/mass/ascites reported.   - Infection as above, no reported hypotension from outside hospital though w/ recent hypoxia. Recent echo wnl as below  - Recently started on ASA/statin 2/2025, holding. Pt has also been on amitriptyline and duloxetine 120mg daily, holding, will likely need to reduce dose/taper off pending workup.  - Pt diagnosed with hepatic steatosis 10/2024, follows w/ GI Dr. Swan. + Hx daily etoh use for ~10 years, reports has significantly cut back ~5 years ago. Serum etoh negative at outside ED  - On previous eval noted to have elevated ferritin/abn iron profile. Seen by heme/onc, gene testing sent, no suggestion of hemochromatosis, elevated ferritin suspected 2/2 chronic liver disease  - GI  notified prior to transfer, consult placed, appreciate assistance  - Pending cmp,    -hepatitis panel-NEG  -acetaminophen/salicylate,   -abd ultrasound.Enlarged liver with increased hepatic parenchymal echogenicity most frequently seen with fatty infiltration.  - trend labs     Nonobstructive CAD  Syncope  - Stress test 12/4/24 w/ possible inferior wall defect  - LHC at [place] 2/26/25 w/ 40-50% lesion mid-LAD, 50% stenosis LCx. No PCI indicated  - TTE at [place] 2/24/25 EF 50-55%, normal LV size w/ concentric remodeling, normal RV size/function, no significant valvular disease.   - Follows with cardiology Dr. Horne, pending CCTA 3/31, will notify of [facility]  - Zio patch in place  - holding statin/asa as above, cont metoprolol  - telemetry     Prediabetes  - A1C 6.2, had previously been wnl  - f/u outpt     Chronic vertigo  Depression  Anxiety  Chronic pain  - Holding amitriptyline/duloxetine as above, else cont home medications    VTE Prophylaxis:  Pharmacologic & mechanical VTE prophylaxis orders are present.      Disposition Planning:   Barriers to Discharge:HEPATITIS.PNA  Anticipated Date of Discharge: 3/28  Place of Discharge: HOME      Time: 34 minutes     Code Status and Medical Interventions: CPR (Attempt to Resuscitate); Full Support   Ordered at: 03/26/25 1433     Code Status (Patient has no pulse and is not breathing):    CPR (Attempt to Resuscitate)     Medical Interventions (Patient has pulse or is breathing):    Full Support     Level Of Support Discussed With:    Patient       Signature: Electronically signed by Flo Casey MD, 03/27/25, 09:52 EDT.  [facility] Hospitalist Team

## 2025-03-28 ENCOUNTER — INPATIENT HOSPITAL (OUTPATIENT)
Dept: URBAN - METROPOLITAN AREA HOSPITAL 84 | Facility: HOSPITAL | Age: 55
End: 2025-03-28
Payer: COMMERCIAL

## 2025-03-28 DIAGNOSIS — R74.01 ELEVATION OF LEVELS OF LIVER TRANSAMINASE LEVELS: ICD-10-CM

## 2025-03-28 DIAGNOSIS — D69.6 THROMBOCYTOPENIA, UNSPECIFIED: ICD-10-CM

## 2025-03-28 DIAGNOSIS — K59.00 CONSTIPATION, UNSPECIFIED: ICD-10-CM

## 2025-03-28 DIAGNOSIS — D53.9 NUTRITIONAL ANEMIA, UNSPECIFIED: ICD-10-CM

## 2025-03-28 DIAGNOSIS — R11.2 NAUSEA WITH VOMITING, UNSPECIFIED: ICD-10-CM

## 2025-03-28 LAB
ALBUMIN SERPL-MCNC: 3.4 G/DL (ref 3.5–5.2)
ALBUMIN/GLOB SERPL: 1.3 G/DL
ALP SERPL-CCNC: 251 U/L (ref 39–117)
ALT SERPL W P-5'-P-CCNC: 112 U/L (ref 1–33)
ANA SER QL: NEGATIVE
ANION GAP SERPL CALCULATED.3IONS-SCNC: 10.7 MMOL/L (ref 5–15)
AST SERPL-CCNC: 157 U/L (ref 1–32)
BILIRUB SERPL-MCNC: 0.9 MG/DL (ref 0–1.2)
BUN SERPL-MCNC: 4 MG/DL (ref 6–20)
BUN/CREAT SERPL: 8.5 (ref 7–25)
CALCIUM SPEC-SCNC: 8.6 MG/DL (ref 8.6–10.5)
CHLORIDE SERPL-SCNC: 101 MMOL/L (ref 98–107)
CO2 SERPL-SCNC: 27.3 MMOL/L (ref 22–29)
CREAT SERPL-MCNC: 0.47 MG/DL (ref 0.57–1)
EGFRCR SERPLBLD CKD-EPI 2021: 114 ML/MIN/1.73
GLOBULIN UR ELPH-MCNC: 2.6 GM/DL
GLUCOSE SERPL-MCNC: 95 MG/DL (ref 65–99)
MITOCHONDRIA M2 IGG SER-ACNC: <20 UNITS (ref 0–20)
POTASSIUM SERPL-SCNC: 3.6 MMOL/L (ref 3.5–5.2)
POTASSIUM SERPL-SCNC: 4.5 MMOL/L (ref 3.5–5.2)
PROT SERPL-MCNC: 6 G/DL (ref 6–8.5)
SMA IGG SER-ACNC: 8 UNITS (ref 0–19)
SODIUM SERPL-SCNC: 139 MMOL/L (ref 136–145)

## 2025-03-28 PROCEDURE — 25010000002 ENOXAPARIN PER 10 MG: Performed by: STUDENT IN AN ORGANIZED HEALTH CARE EDUCATION/TRAINING PROGRAM

## 2025-03-28 PROCEDURE — 80053 COMPREHEN METABOLIC PANEL: CPT | Performed by: INTERNAL MEDICINE

## 2025-03-28 PROCEDURE — 25010000002 LABETALOL 5 MG/ML SOLUTION: Performed by: STUDENT IN AN ORGANIZED HEALTH CARE EDUCATION/TRAINING PROGRAM

## 2025-03-28 PROCEDURE — 99232 SBSQ HOSP IP/OBS MODERATE 35: CPT

## 2025-03-28 PROCEDURE — 92526 ORAL FUNCTION THERAPY: CPT

## 2025-03-28 PROCEDURE — 84132 ASSAY OF SERUM POTASSIUM: CPT | Performed by: INTERNAL MEDICINE

## 2025-03-28 PROCEDURE — 25010000002 CEFTRIAXONE PER 250 MG: Performed by: STUDENT IN AN ORGANIZED HEALTH CARE EDUCATION/TRAINING PROGRAM

## 2025-03-28 RX ORDER — POTASSIUM CHLORIDE 1500 MG/1
40 TABLET, EXTENDED RELEASE ORAL EVERY 4 HOURS
Status: COMPLETED | OUTPATIENT
Start: 2025-03-28 | End: 2025-03-28

## 2025-03-28 RX ORDER — LABETALOL HYDROCHLORIDE 5 MG/ML
10 INJECTION, SOLUTION INTRAVENOUS ONCE
Status: COMPLETED | OUTPATIENT
Start: 2025-03-28 | End: 2025-03-28

## 2025-03-28 RX ORDER — FOLIC ACID 1 MG/1
1 TABLET ORAL DAILY
Status: DISCONTINUED | OUTPATIENT
Start: 2025-03-28 | End: 2025-03-31 | Stop reason: HOSPADM

## 2025-03-28 RX ADMIN — DULOXETINE 120 MG: 30 CAPSULE, DELAYED RELEASE ORAL at 08:41

## 2025-03-28 RX ADMIN — ENOXAPARIN SODIUM 40 MG: 100 INJECTION SUBCUTANEOUS at 17:29

## 2025-03-28 RX ADMIN — GUAIFENESIN AND CODEINE PHOSPHATE 10 ML: 100; 10 SOLUTION ORAL at 21:01

## 2025-03-28 RX ADMIN — Medication 250 MG: at 08:41

## 2025-03-28 RX ADMIN — Medication 5000 UNITS: at 08:40

## 2025-03-28 RX ADMIN — Medication 10 ML: at 08:41

## 2025-03-28 RX ADMIN — POTASSIUM CHLORIDE 40 MEQ: 1500 TABLET, EXTENDED RELEASE ORAL at 12:23

## 2025-03-28 RX ADMIN — AMITRIPTYLINE HYDROCHLORIDE 75 MG: 50 TABLET, FILM COATED ORAL at 20:51

## 2025-03-28 RX ADMIN — POTASSIUM CHLORIDE 40 MEQ: 1500 TABLET, EXTENDED RELEASE ORAL at 08:41

## 2025-03-28 RX ADMIN — CEFTRIAXONE 2000 MG: 2 INJECTION, POWDER, FOR SOLUTION INTRAMUSCULAR; INTRAVENOUS at 17:29

## 2025-03-28 RX ADMIN — Medication 10 ML: at 20:51

## 2025-03-28 RX ADMIN — GUAIFENESIN AND CODEINE PHOSPHATE 10 ML: 100; 10 SOLUTION ORAL at 10:30

## 2025-03-28 RX ADMIN — FOLIC ACID 1 MG: 1 TABLET ORAL at 08:41

## 2025-03-28 RX ADMIN — Medication 100 MG: at 08:41

## 2025-03-28 RX ADMIN — LABETALOL HYDROCHLORIDE 10 MG: 5 INJECTION, SOLUTION INTRAVENOUS at 06:28

## 2025-03-28 RX ADMIN — Medication 250 MG: at 20:51

## 2025-03-28 NOTE — PROGRESS NOTES
[facility] MEDICINE SERVICE  DAILY PROGRESS NOTE    NAME: Kayla Huber  : 1970  MRN: [id number]      LOS: 2 days     PROVIDER OF SERVICE: Flo Casey MD    Chief Complaint: <principal problem not specified>    Subjective:     Interval History:  History taken from: patient    History of Present Illness: Kayla Huber is a 54 y.o. female with a CMH of Hepatic steatosis, Hx etoh abuse, chronic vertigo, depression, anxiety, Hx tobacco use, chronic back/left shoulder pain who presented to [facility] on 3/26/2025 as a transfer from [facility] for pneumonia/elevated liver enzymes. Pt went to see her PCP 3/20 for hospital follow up, at that time did report some SOA/cough/wheeze. Was started on prednisone course but cont to have worsening symptoms, also reports fevers/chills/sore throat. She was seen at Urgent Care , found to be satting mid 80s, sent to [facility]. Diagnosed with sepsis/pneumonia there, started on abx with azithromycin and ceftriaxone. Lactic elevated and initially uptrending, peak 4.9, most recently downtrending. Pt noted to have elevated liver enzymes, no GI available at facility. Discussed with GI Dr. Farmer here who agreed to consult, hospitalist service consulted for transfer.   She does have a diagnosis of hepatic steatosis from earlier this year, has been following with GI Dr. Beny shelton. Of note she was admitted to [place] 2024 for syncopal episode/hypotension/NSTEMI, did initially require pressors. Underwent C  for mildly elevated troponin and previous abn stress test, nonobstructive CAD, no PCI indicated. Started on aspirin/atorvastatin/metoprolol.   At present pt on 2L O2, reports continued cough/wheeze/SOA though improved from previous. Also has abdominal distention, states it has been distended for months but has been worse recently. Reports some upper abdominal pain/soreness primarily with coughing though also notes some RUQ  tenderness to palpation. Has had decr appetite recently w/ Sx of early satiety. She has a previous Hx daily etoh use, cut back about 5 years ago and presently drinks 1-2 drinks every month. Does state her last drink was 3/24 when she had a bloody loren.      3/27 seen in bed NAD, c/o abd distension, vss, .   2825 patient seen and examined in bed no acute distress, vital signs stable.  Feeling better today, still having some abdominal distention, tolerating diet without nausea or vomiting.  Had 2 soft bowel movement yesterday.    Review of Systems  Constitutional:  Positive for appetite change, chills and fever. Negative for activity change.   HENT:  Positive for congestion and sore throat.    Eyes: Negative.    Respiratory:  Positive for cough, shortness of breath and wheezing.    Cardiovascular:  Negative for chest pain, palpitations and leg swelling.   Gastrointestinal:  Positive for abdominal distention, abdominal pain and nausea. Negative for blood in stool, constipation, diarrhea and vomiting.   Genitourinary:  Negative for dysuria, frequency and urgency.   Musculoskeletal:  Negative for arthralgias and myalgias.   Neurological:  Negative for dizziness, syncope, weakness, light-headedness and headaches.   Objective:     Vital Signs  Temp:  [97.5 °F (36.4 °C)-98.5 °F (36.9 °C)] 97.5 °F (36.4 °C)  Heart Rate:  [] 81  Resp:  [16-20] 20  BP: (148-177)/() 150/85  Flow (L/min) (Oxygen Therapy):  [1.5] 1.5   Body mass index is 28.06 kg/m².    Physical Exam      General: She is not in acute distress.  HENT:      Head: Normocephalic and atraumatic.      Mouth/Throat:      Mouth: Mucous membranes are moist.      Pharynx: Oropharynx is clear.   Eyes:      Extraocular Movements: Extraocular movements intact.      Conjunctiva/sclera: Conjunctivae normal.      Pupils: Pupils are equal, round, and reactive to light.   Cardiovascular:      Rate and Rhythm: Normal rate and regular rhythm.      Pulses: Normal  pulses.      Heart sounds: Normal heart sounds.   Pulmonary:      Breath sounds: Rhonchi present.      Comments: No incr wob on 2L NC  Minimal wheezing appreciated  Abdominal:      General: Bowel sounds are normal. There is distension.      Palpations: Abdomen is soft.      Tenderness: There is no guarding or rebound.      Comments: RUQ mildly TTP   Musculoskeletal:         General: No swelling or tenderness.      Cervical back: Normal range of motion and neck supple.      Right lower leg: No edema.      Left lower leg: No edema.   Skin:     General: Skin is warm and dry.      Coloration: Skin is not jaundiced.   Neurological:      General: No focal deficit present.      Mental Status: She is alert and oriented to person, place, and time. Mental status is at baseline.        Diagnostic Data    Results from last 7 days   Lab Units 03/28/25  0702 03/27/25  0049   WBC 10*3/mm3  --  6.64   HEMOGLOBIN g/dL  --  11.5*   HEMATOCRIT %  --  37.0   PLATELETS 10*3/mm3  --  131*   GLUCOSE mg/dL 95 139*   CREATININE mg/dL 0.47* 0.51*   BUN mg/dL 4* 6   SODIUM mmol/L 139 135*   POTASSIUM mmol/L 3.6 4.3   AST (SGOT) U/L 157* 322*   ALT (SGPT) U/L 112* 149*   ALK PHOS U/L 251* 273*   BILIRUBIN mg/dL 0.9 1.0   ANION GAP mmol/L 10.7 11.1       XR Abdomen KUB  Result Date: 3/26/2025  Impression: 1.Nonspecific nonobstructive bowel gas pattern. Mild colonic stool burden. 2.Hepatomegaly. Electronically Signed: Alcon Odonnell  3/26/2025 6:44 PM EDT  Workstation ID: [id number]    US Abdomen Limited  Result Date: 3/26/2025  Impression: Enlarged liver with increased hepatic parenchymal echogenicity most frequently seen with fatty infiltration. Electronically Signed: Shreyas Herr MD  3/26/2025 4:08 PM EDT  Workstation ID: [id number]    Scheduled Meds:amitriptyline, 75 mg, Oral, Nightly  cefTRIAXone, 2,000 mg, Intravenous, Q24H  DULoxetine, 120 mg, Oral, Daily  enoxaparin sodium, 40 mg, Subcutaneous, Daily  folic acid, 1 mg, Oral,  Daily  saccharomyces boulardii, 250 mg, Oral, BID  sodium chloride, 10 mL, Intravenous, Q12H  sorbitol, 30 mL, Oral, Daily  thiamine, 100 mg, Oral, Daily  vitamin D3, 5,000 Units, Oral, Daily      Continuous Infusions:   PRN Meds:.  acetaminophen    senna-docusate sodium **AND** polyethylene glycol **AND** bisacodyl **AND** bisacodyl    Calcium Replacement - Follow Nurse / BPA Driven Protocol    guaiFENesin-codeine    ipratropium-albuterol    Magnesium Standard Dose Replacement - Follow Nurse / BPA Driven Protocol    nitroglycerin    ondansetron    Phosphorus Replacement - Follow Nurse / BPA Driven Protocol    Potassium Replacement - Follow Nurse / BPA Driven Protocol    sodium chloride    sodium chloride     I reviewed the patient's new clinical results.    Assessment/Plan:     Active and Resolved Problems  Active Hospital Problems    Diagnosis  POA    Pneumonia [J18.9]  Yes      Resolved Hospital Problems   No resolved problems to display.     Pneumonia-Human Metapneumovirus  Acute hypoxic respiratory failure  Sepsis   -- Lactic elevated at OSH to peak of 4.9, given fluids, downtrending. Repeat-1.9  - CTA Chest at OSH no evidence of PE, + multifocal pneumonia  - Initially requiring 6L O2, presently on 2L  - On albuterol at home, no diagnosis asthma/copd, +smoking Hx    - RVP Human Metapneumovirus  - BCx drawn at [facility] 2/25, f/u  - Cont abx with ceftriaxone, add azithromycin if indicated by RVP. Received abx already today.   - duonebs   - IVF as needed  - cont pulse ox  - wean O2 as able     Elevated liver enzymes  Hepatic steatosis  Hx etoh abuse  - at OSH AlkPh 246, , , Tbili 1.3. CTAP done showing hepatic steatosis, no obstruction/mass/ascites reported.   - Infection as above, no reported hypotension from outside hospital though w/ recent hypoxia. Recent echo wnl as below  - Recently started on ASA/statin 2/2025, holding. Pt has also been on amitriptyline and duloxetine 120mg daily,  holding, will likely need to reduce dose/taper off pending workup.  - Pt diagnosed with hepatic steatosis 10/2024, follows w/ GI Dr. Swan. + Hx daily etoh use for ~10 years, reports has significantly cut back ~5 years ago. Serum etoh negative at outside ED  - On previous eval noted to have elevated ferritin/abn iron profile. Seen by heme/onc, gene testing sent, no suggestion of hemochromatosis, elevated ferritin suspected 2/2 chronic liver disease  - GI notified prior to transfer, consult placed, appreciate assistance  - Pending cmp,    -hepatitis panel-NEG  -acetaminophen/salicylate,   -abd ultrasound.Enlarged liver with increased hepatic parenchymal echogenicity most frequently seen with fatty infiltration.  - trend labs     Nonobstructive CAD  Syncope  - Stress test 12/4/24 w/ possible inferior wall defect  - LHC at [place] 2/26/25 w/ 40-50% lesion mid-LAD, 50% stenosis LCx. No PCI indicated  - TTE at [place] 2/24/25 EF 50-55%, normal LV size w/ concentric remodeling, normal RV size/function, no significant valvular disease.   - Follows with cardiology Dr. Horne, pending CCTA 3/31, will [facility] of [facility]  - Zio patch in place  - holding statin/asa as above, cont metoprolol  - telemetry     Prediabetes  - A1C 6.2, had previously been wnl  - f/u outpt     Chronic vertigo  Depression  Anxiety  Chronic pain  - Holding amitriptyline/duloxetine as above, else cont home medications    VTE Prophylaxis:  Pharmacologic & mechanical VTE prophylaxis orders are present.      Disposition Planning:   Barriers to Discharge:HEPATITIS.PNA  Anticipated Date of Discharge: 3/28  Place of Discharge: HOME      Time: 34 minutes     Code Status and Medical Interventions: CPR (Attempt to Resuscitate); Full Support   Ordered at: 03/26/25 1433     Code Status (Patient has no pulse and is not breathing):    CPR (Attempt to Resuscitate)     Medical Interventions (Patient has pulse or is breathing):    Full Support     Level Of Support  Discussed With:    Patient       Signature: Electronically signed by Flo Casey MD, 03/28/25, 12:43 EDT.  [facility] Hospitalist Team

## 2025-03-28 NOTE — CASE MANAGEMENT/SOCIAL WORK
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 OP PT recommended; will need order and sent to [facility]    Plan Comments Barriers: IV antibiotics.  GI following. At discharge, Ms. 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  She agrees to do OP PT and  chooses [facility] OP location. CM will need to send orders once  writes.  She said her brother will pick her up at discharge                        Eleanor CHAIDEZ,RN Case Manager  [facility]  Phone: Desk- [phone]  Cell- [phone]
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
## 2025-03-28 NOTE — PLAN OF CARE
Goal Outcome Evaluation:  Plan of Care Reviewed With: patient        Progress: improving          Pt c/o dry, nonproductive cough and headache. Pt resting comfortably with no other complaints. Currently on 1.5L O2. Will continue to monitor...

## 2025-03-28 NOTE — PROGRESS NOTES
LOS: 2 days   Patient Care Team:  Chantal Benitez DO as PCP - General (Family Medicine)  Eric Magaña Jr., RENETTA as Consulting Physician (Podiatry)  Brody Santo MD as Consulting Physician (Obstetrics and Gynecology)  Ryan Guzmán MD as Consulting Physician (Hematology and Oncology)  Dimitris Manriquez MD as Consulting Physician (Cardiology)  Neil Swan MD as Consulting Physician (Gastroenterology)      Subjective     Interval History:     Subjective: Patient reports he is feeling very well today.  Tolerating diet with no abdominal pain, nausea, or vomiting.  Reports x 2 soft formed bowel movements yesterday.      ROS:   No chest pain, shortness of breath, or cough.  No abdominal pain, nausea, vomiting, or constipation.       Medication Review:     Current Facility-Administered Medications:     acetaminophen (TYLENOL) tablet 1,000 mg, 1,000 mg, Oral, Q8H PRN, Ricki Xavier MD, 1,000 mg at 03/27/25 2111    amitriptyline (ELAVIL) tablet 75 mg, 75 mg, Oral, Nightly, EirkTej MD, 75 mg at 03/27/25 2112    sennosides-docusate (PERICOLACE) 8.6-50 MG per tablet 2 tablet, 2 tablet, Oral, BID PRN **AND** polyethylene glycol (MIRALAX) packet 17 g, 17 g, Oral, Daily PRN **AND** bisacodyl (DULCOLAX) EC tablet 5 mg, 5 mg, Oral, Daily PRN **AND** bisacodyl (DULCOLAX) suppository 10 mg, 10 mg, Rectal, Daily PRN, Raman Hernandez MD    Calcium Replacement - Follow Nurse / BPA Driven Protocol, , Not Applicable, PRN, Raman Hernandez MD    cefTRIAXone (ROCEPHIN) 2,000 mg in sodium chloride 0.9 % 100 mL MBP, 2,000 mg, Intravenous, Q24H, Raman Hernandez MD, Last Rate: 200 mL/hr at 03/27/25 1701, 2,000 mg at 03/27/25 1701    DULoxetine (CYMBALTA) DR capsule 120 mg, 120 mg, Oral, Daily, Eirk, Tej E, MD, 120 mg at 03/28/25 0841    enoxaparin sodium (LOVENOX) syringe 40 mg, 40 mg, Subcutaneous, Daily, Raman Hernandez MD, 40 mg at 03/27/25 1701    folic acid (FOLVITE) tablet 1  mg, 1 mg, Oral, Daily, Flo Casey MD, 1 mg at 03/28/25 0841    guaiFENesin-codeine (ROMILAR-AC) solution 10 mL, 10 mL, Oral, Q6H PRN, Ricki Xavier MD, 10 mL at 03/28/25 1030    ipratropium-albuterol (DUO-NEB) nebulizer solution 3 mL, 3 mL, Nebulization, Q6H PRN, Raman Hernandez MD, 3 mL at 03/27/25 1125    Magnesium Standard Dose Replacement - Follow Nurse / BPA Driven Protocol, , Not Applicable, PRN, Raman Hernandez MD    nitroglycerin (NITROSTAT) SL tablet 0.4 mg, 0.4 mg, Sublingual, Q5 Min PRN, Raman Hernandez MD    ondansetron (ZOFRAN) injection 4 mg, 4 mg, Intravenous, Q6H PRN, Tej Hurt MD, 4 mg at 03/27/25 0031    Phosphorus Replacement - Follow Nurse / BPA Driven Protocol, , Not Applicable, PRN, Raman Hernandez MD    potassium chloride (KLOR-CON M20) CR tablet 40 mEq, 40 mEq, Oral, Q4H, Flo Casey MD, 40 mEq at 03/28/25 0841    Potassium Replacement - Follow Nurse / BPA Driven Protocol, , Not Applicable, LUCRECIA, Raman Hernandez MD    saccharomyces boulardii (FLORASTOR) capsule 250 mg, 250 mg, Oral, BID, Flo Casey MD, 250 mg at 03/28/25 0841    sodium chloride 0.9 % flush 10 mL, 10 mL, Intravenous, Q12H, Raman Hernandez MD, 10 mL at 03/28/25 0841    sodium chloride 0.9 % flush 10 mL, 10 mL, Intravenous, PRN, Raman Hernandez MD    sodium chloride 0.9 % infusion 40 mL, 40 mL, Intravenous, PRN, Raman Hernandez MD    sorbitol solution 30 mL, 30 mL, Oral, Daily, Jennifer Adams, APRN, 30 mL at 03/26/25 2229    thiamine (VITAMIN B-1) tablet 100 mg, 100 mg, Oral, Daily, Flo Casey MD, 100 mg at 03/28/25 0841    vitamin D3 capsule 5,000 Units, 5,000 Units, Oral, Daily, Flo Casey MD, 5,000 Units at 03/28/25 0840      Objective     Vital Signs  Vitals:    03/28/25 0500 03/28/25 0640 03/28/25 0749 03/28/25 1114   BP: (!) 177/115 154/88 153/90 150/85   BP Location: Right arm   Right arm   Patient Position: Lying   Lying   Pulse: 88 73 81     Resp: 20  20 20   Temp: 98.5 °F (36.9 °C)  97.7 °F (36.5 °C) 97.5 °F (36.4 °C)   TempSrc: Oral  Oral Oral   SpO2: 95% 94% 94%    Weight:       Height:           Physical Exam:     General Appearance:    Awake and alert, in no acute distress, laying in bed   Head:    Normocephalic, without obvious abnormality   Eyes:          Conjunctivae normal, anicteric sclera       Neck:   supple, no JVD   Lungs:     respirations regular, even and unlabored, 2 L nasal cannula   Abdomen:     Soft, non-tender, no rebound or guarding, non-distended       Extremities:   No edema, no cyanosis   Skin:   No bruising or rash, no jaundice        Results Review:    Results from last 7 days   Lab Units 03/27/25  0049 03/26/25  1500   WBC 10*3/mm3 6.64 5.73   HEMOGLOBIN g/dL 11.5* 10.9*   PLATELETS 10*3/mm3 131* 127*     Results from last 7 days   Lab Units 03/28/25  0702 03/27/25  0049 03/26/25  1500   SODIUM mmol/L 139 135* 138   POTASSIUM mmol/L 3.6 4.3 3.3*   CHLORIDE mmol/L 101 102 102   CO2 mmol/L 27.3 21.9* 23.8   BUN mg/dL 4* 6 7   CREATININE mg/dL 0.47* 0.51* 0.54*   GLUCOSE mg/dL 95 139* 106*   ALBUMIN g/dL 3.4* 3.5 3.4*   BILIRUBIN mg/dL 0.9 1.0 0.9   ALK PHOS U/L 251* 273* 274*   AST (SGOT) U/L 157* 322* 360*   ALT (SGPT) U/L 112* 149* 157*   INR   --   --  1.10     Estimated Creatinine Clearance: 162.1 mL/min (A) (by C-G formula based on SCr of 0.47 mg/dL (L)).  Lab Results   Component Value Date    HGBA1C 5.6 07/09/2024         Infection     UA      Microbiology Results (last 10 days)       Procedure Component Value - Date/Time    S. Pneumo Ag Urine or CSF - Urine, Urine, Clean Catch [[id number]]  (Normal) Collected: 03/26/25 1810    Lab Status: Final result Specimen: Urine, Clean Catch Updated: 03/26/25 1858     Strep Pneumo Ag Negative    Legionella Antigen, Urine - Urine, Urine, Clean Catch [[id number]]  (Normal) Collected: 03/26/25 1810    Lab Status: Final result Specimen: Urine, Clean Catch Updated: 03/26/25 1858      LEGIONELLA ANTIGEN, URINE Negative    Respiratory Panel PCR w/COVID-19(SARS-CoV-2) ZOILA/CHAZ/VIJAY/PAD/COR/PERI In-House, NP Swab in UTM/VTM, 2 HR TAT - Swab, Nasopharynx [[id number]]  (Abnormal) Collected: 03/26/25 1456    Lab Status: Final result Specimen: Swab from Nasopharynx Updated: 03/26/25 1652     ADENOVIRUS, PCR Not Detected     Coronavirus 229E Not Detected     Coronavirus HKU1 Not Detected     Coronavirus NL63 Not Detected     Coronavirus OC43 Not Detected     COVID19 Not Detected     Human Metapneumovirus Detected     Human Rhinovirus/Enterovirus Not Detected     Influenza A PCR Not Detected     Influenza B PCR Not Detected     Parainfluenza Virus 1 Not Detected     Parainfluenza Virus 2 Not Detected     Parainfluenza Virus 3 Not Detected     Parainfluenza Virus 4 Not Detected     RSV, PCR Not Detected     Bordetella pertussis pcr Not Detected     Bordetella parapertussis PCR Not Detected     Chlamydophila pneumoniae PCR Not Detected     Mycoplasma pneumo by PCR Not Detected    Narrative:      In the setting of a positive respiratory panel with a viral infection PLUS a negative procalcitonin without other underlying concern for bacterial infection, consider observing off antibiotics or discontinuation of antibiotics and continue supportive care. If the respiratory panel is positive for atypical bacterial infection (Bordetella pertussis, Chlamydophila pneumoniae, or Mycoplasma pneumoniae), consider antibiotic de-escalation to target atypical bacterial infection.          Imaging Results (Last 72 Hours)       Procedure Component Value Units Date/Time    XR Abdomen KUB [[id number]] Collected: 03/26/25 1840     Updated: 03/26/25 1847    Narrative:      XR ABDOMEN KUB    Date of Exam: 3/26/2025 6:20 PM EDT    Indication: Constipation, abdominal distention    Comparison: CT abdomen and pelvis dated 3/25/2025    Findings:  There is hepatomegaly. There is a nonspecific nonobstructive bowel gas pattern. There is a mild  colonic stool burden. No visible renal calcifications. There is a right intramedullary nail.      Impression:      Impression:  1.Nonspecific nonobstructive bowel gas pattern. Mild colonic stool burden.  2.Hepatomegaly.        Electronically Signed: Alcon Odonnell    3/26/2025 6:44 PM EDT    Workstation ID: [id number]    US Abdomen Limited [[id number]] Collected: 03/26/25 1606     Updated: 03/26/25 1610    Narrative:      US ABDOMEN LIMITED    Date of Exam: 3/26/2025 3:48 PM EDT    Indication: Elevated liver enzymes.    Comparison: CT abdomen and pelvis 3/25/2025, ultrasound abdomen 12/5/2024    Technique: Grayscale and color Doppler ultrasound evaluation of the right upper quadrant was performed.      Findings:  LIVER: Mild increased hepatic parenchymal echogenicity most frequently seen with fatty infiltration. The liver is enlarged, measuring 21 cm in length. No solid lesions identified. Normal flow is present within the hepatic vasculature.     GALLBLADDER:  The gallbladder appears normal in size with no focal lesions. No evidence of stones, wall thickening or pericholecystic fluid. The common bile duct appears normal in caliber.  No positive sonographic Jones sign reported.    PANCREAS:  Visualized portions are unremarkable.    RIGHT KIDNEY:  Normal in size with no solid lesions. No evidence of nephrolithiasis or hydronephrosis.          Impression:      Impression:  Enlarged liver with increased hepatic parenchymal echogenicity most frequently seen with fatty infiltration.        Electronically Signed: Shreyas Herr MD    3/26/2025 4:08 PM EDT    Workstation ID: [id number]            Assessment & Plan   Patient is a 54-year-old female who presented to [facility] on 3/26/2025 for shortness of air and cough with fevers and sore throat.  Patient was transferred to [facility] same-day for further management of sepsis/pneumonia.  GI was consulted for elevated LFTs.       WBC 6.64, hemoglobin 11.5, .3,  platelet 131  Alk phos 251, , , total bilirubin 0.9    Lactate normal at 1.9  Folate 4.25 (L)  GGT 1,409  Iron profile and vitamin B12 unremarkable     Acute hepatitis panel, immunoglobulins, acetaminophen and salicylate levels unremarkable.     KUB-nonspecific nonobstructive bowel gas pattern.  Mild colonic stool burden.  Hepatomegaly.     RUQ US-enlarged liver with increased hepatic parenchymal echogenicity, most seen with fatty infiltration.  Normal CBD.     Reportedly at [facility]: Alk phos 246, , , total bilirubin 0.3 with CT abdomen/pelvis showing hepatic steatosis  Lactic acid 4.9  CTA chest-no evidence of PE.  Multifocal pneumonia.     5/2008 liver biopsy-severe steatosis and mild steatohepatitis        ASSESSMENT:  Elevated LFTs  Nausea with vomiting  Constipation  Pneumonia  Macrocytic anemia  Thrombocytopenia.  Acute hypoxic respiratory failure     PLAN:  -Patient reportedly is followed by Dr. Swan outpatient for hepatic steatosis.  -RUQ US negative for choledocholithiasis.  Fatty liver. Possibly drug-related due to new atorvastatin use 2 weeks ago vs shock liver vs infection.  -Avoid hepatotoxic medications.  -Liver serologies negative thus far.  ASMA, M2 antibody, and JITENDRA pending.  Patient did have liver biopsy by Dr. Belcher in 2008 revealing severe steatosis and mild steatohepatitis.    -LFTs trending down.  Continue to trend LFTs.  -Hemoglobin stable.  Mild folic acid deficiency.  -Would recommend outpatient EGD with dilation for dysphagia.  Task sent to office to schedule hospital follow-up appointment.  -Electrolyte replacement per primary care team.  -Recommend complete alcohol cessation.  -Okay for diet as tolerated from GI standpoint.  No plan for further evaluation with endoscopy inpatient while patient recovers from pneumonia.  -Supportive care.  -We will follow.    Electronically signed by GER Suarez, 03/28/25, 11:19 AM EDT.

## 2025-03-29 DIAGNOSIS — R10.13 EPIGASTRIC PAIN: ICD-10-CM

## 2025-03-29 LAB
ANION GAP SERPL CALCULATED.3IONS-SCNC: 12.2 MMOL/L (ref 5–15)
BASOPHILS # BLD MANUAL: 0.07 10*3/MM3 (ref 0–0.2)
BASOPHILS NFR BLD MANUAL: 1 % (ref 0–1.5)
BUN SERPL-MCNC: 3 MG/DL (ref 6–20)
BUN/CREAT SERPL: 6.4 (ref 7–25)
CALCIUM SPEC-SCNC: 9.4 MG/DL (ref 8.6–10.5)
CHLORIDE SERPL-SCNC: 99 MMOL/L (ref 98–107)
CO2 SERPL-SCNC: 26.8 MMOL/L (ref 22–29)
CREAT SERPL-MCNC: 0.47 MG/DL (ref 0.57–1)
DEPRECATED RDW RBC AUTO: 64.1 FL (ref 37–54)
EGFRCR SERPLBLD CKD-EPI 2021: 114 ML/MIN/1.73
EOSINOPHIL # BLD MANUAL: 0.14 10*3/MM3 (ref 0–0.4)
EOSINOPHIL NFR BLD MANUAL: 2 % (ref 0.3–6.2)
ERYTHROCYTE [DISTWIDTH] IN BLOOD BY AUTOMATED COUNT: 17.4 % (ref 12.3–15.4)
GLUCOSE SERPL-MCNC: 103 MG/DL (ref 65–99)
HCT VFR BLD AUTO: 41.1 % (ref 34–46.6)
HGB BLD-MCNC: 13 G/DL (ref 12–15.9)
LYMPHOCYTES # BLD MANUAL: 1.3 10*3/MM3 (ref 0.7–3.1)
LYMPHOCYTES NFR BLD MANUAL: 9 % (ref 5–12)
MCH RBC QN AUTO: 31.6 PG (ref 26.6–33)
MCHC RBC AUTO-ENTMCNC: 31.6 G/DL (ref 31.5–35.7)
MCV RBC AUTO: 100 FL (ref 79–97)
METAMYELOCYTES NFR BLD MANUAL: 4 % (ref 0–0)
MONOCYTES # BLD: 0.61 10*3/MM3 (ref 0.1–0.9)
MYELOCYTES NFR BLD MANUAL: 1 % (ref 0–0)
NEUTROPHILS # BLD AUTO: 4.36 10*3/MM3 (ref 1.7–7)
NEUTROPHILS NFR BLD MANUAL: 61 % (ref 42.7–76)
NEUTS BAND NFR BLD MANUAL: 3 % (ref 0–5)
PLAT MORPH BLD: NORMAL
PLATELET # BLD AUTO: 186 10*3/MM3 (ref 140–450)
PMV BLD AUTO: 10 FL (ref 6–12)
POTASSIUM SERPL-SCNC: 4.2 MMOL/L (ref 3.5–5.2)
RBC # BLD AUTO: 4.11 10*6/MM3 (ref 3.77–5.28)
SCAN SLIDE: NORMAL
SODIUM SERPL-SCNC: 138 MMOL/L (ref 136–145)
STOMATOCYTES BLD QL SMEAR: ABNORMAL
VARIANT LYMPHS NFR BLD MANUAL: 15 % (ref 19.6–45.3)
VARIANT LYMPHS NFR BLD MANUAL: 4 % (ref 0–5)
WBC MORPH BLD: NORMAL
WBC NRBC COR # BLD AUTO: 6.82 10*3/MM3 (ref 3.4–10.8)

## 2025-03-29 PROCEDURE — 85025 COMPLETE CBC W/AUTO DIFF WBC: CPT

## 2025-03-29 PROCEDURE — 25010000002 ENOXAPARIN PER 10 MG: Performed by: STUDENT IN AN ORGANIZED HEALTH CARE EDUCATION/TRAINING PROGRAM

## 2025-03-29 PROCEDURE — 85007 BL SMEAR W/DIFF WBC COUNT: CPT

## 2025-03-29 PROCEDURE — 94618 PULMONARY STRESS TESTING: CPT

## 2025-03-29 PROCEDURE — 80048 BASIC METABOLIC PNL TOTAL CA: CPT

## 2025-03-29 RX ORDER — MECLIZINE HYDROCHLORIDE 25 MG/1
25 TABLET ORAL 3 TIMES DAILY PRN
Status: DISCONTINUED | OUTPATIENT
Start: 2025-03-29 | End: 2025-03-31 | Stop reason: HOSPADM

## 2025-03-29 RX ADMIN — MECLIZINE HYDROCHLORIDE 25 MG: 25 TABLET ORAL at 11:22

## 2025-03-29 RX ADMIN — MECLIZINE HYDROCHLORIDE 25 MG: 25 TABLET ORAL at 00:25

## 2025-03-29 RX ADMIN — ACETAMINOPHEN 1000 MG: 500 TABLET, FILM COATED ORAL at 13:08

## 2025-03-29 RX ADMIN — Medication 10 ML: at 20:19

## 2025-03-29 RX ADMIN — DULOXETINE 120 MG: 30 CAPSULE, DELAYED RELEASE ORAL at 08:39

## 2025-03-29 RX ADMIN — SORBITOL SOLUTION (BULK) 30 ML: 70 SOLUTION at 08:39

## 2025-03-29 RX ADMIN — AMITRIPTYLINE HYDROCHLORIDE 75 MG: 50 TABLET, FILM COATED ORAL at 20:18

## 2025-03-29 RX ADMIN — Medication 250 MG: at 08:39

## 2025-03-29 RX ADMIN — ENOXAPARIN SODIUM 40 MG: 100 INJECTION SUBCUTANEOUS at 16:53

## 2025-03-29 RX ADMIN — GUAIFENESIN AND CODEINE PHOSPHATE 10 ML: 100; 10 SOLUTION ORAL at 19:54

## 2025-03-29 RX ADMIN — Medication 100 MG: at 08:39

## 2025-03-29 RX ADMIN — Medication 10 ML: at 08:39

## 2025-03-29 RX ADMIN — Medication 5000 UNITS: at 08:39

## 2025-03-29 RX ADMIN — Medication 250 MG: at 20:18

## 2025-03-29 RX ADMIN — FOLIC ACID 1 MG: 1 TABLET ORAL at 08:39

## 2025-03-29 NOTE — PLAN OF CARE
Problem: Adult Inpatient Plan of Care  Goal: Absence of Hospital-Acquired Illness or Injury  Intervention: Identify and Manage Fall Risk  Description: Perform standard risk assessment on admission using a validated tool or comprehensive approach appropriate to the patient; reassess fall risk frequently, with change in status or transfer to another level of care.Communicate risk to interprofessional healthcare team; ensure fall risk visible cue.Determine need for increased observation, equipment and environmental modification, as well as use of supportive, nonskid footwear.Adjust safety measures to individual needs and identified risk factors.Reinforce the importance of active participation with fall risk prevention, safety, and physical activity with the patient and family.Perform regular intentional rounding to assess need for position change, pain assessment and personal needs, including assistance with toileting.  Recent Flowsheet Documentation  Taken 3/29/2025 0415 by Valerie Goncalves, RN  Safety Promotion/Fall Prevention:   safety round/check completed   activity supervised  Taken 3/29/2025 0229 by Valerie Goncalves, RN  Safety Promotion/Fall Prevention:   safety round/check completed   activity supervised  Taken 3/29/2025 0029 by Valerie Goncalves, RN  Safety Promotion/Fall Prevention:   safety round/check completed   activity supervised  Taken 3/28/2025 2250 by Valerie Goncalves, RN  Safety Promotion/Fall Prevention:   safety round/check completed   activity supervised  Taken 3/28/2025 2040 by Valerie Goncalves, RN  Safety Promotion/Fall Prevention:   safety round/check completed   activity supervised  Intervention: Prevent Skin Injury  Description: Perform a screening for skin injury risk, such as pressure or moisture-associated skin damage on admission and at regular intervals throughout hospital stay.Keep all areas of skin (especially folds) clean and dry.Maintain adequate skin hydration.Relieve and  redistribute pressure and protect bony prominences and skin at risk for injury; implement measures based on patient-specific risk factors.Match turning and repositioning schedule to clinical condition.Encourage weight shift frequently; assist with reposition if unable to complete independently.Float heels off bed; avoid pressure on the Achilles tendon.Keep skin free from extended contact with medical devices.Optimize nutrition and hydration.Encourage functional activity and mobility, as early as tolerated.Use aids (e.g., slide boards, mechanical lift) during transfer.  Recent Flowsheet Documentation  Taken 3/28/2025 2040 by Valerie Goncalves, RN  Body Position: position changed independently  Goal: Optimal Comfort and Wellbeing  Intervention: Provide Person-Centered Care  Description: Use a family-focused approach to care; encourage support system presence and participation.Develop trust and rapport by proactively providing information, encouraging questions, addressing concerns and offering reassurance.Acknowledge emotional response to hospitalization.Recognize and utilize personal coping strategies and strengths; develop goals via shared decision-making.Honor spiritual and cultural preferences.  Recent Flowsheet Documentation  Taken 3/28/2025 2040 by Valerie Goncalves, RN  Trust Relationship/Rapport:   care explained   choices provided   Goal Outcome Evaluation:  Patient is a/ox4, up ad ruslan. She is still requiring 2L/NC, room air is baseline. She denies any new needs at this time.

## 2025-03-29 NOTE — PROCEDURES
Exercise Oximetry    Patient Name:Kayla Huber   MRN: [id number]   Date: 03/29/25             ROOM AIR BASELINE   SpO2% 93   Heart Rate 108   Blood Pressure 135/91     EXERCISE ON ROOM AIR SpO2% EXERCISE ON O2 @  LPM SpO2%   1 MINUTE 93 1 MINUTE    2 MINUTES 96 2 MINUTES    3 MINUTES 92 3 MINUTES    4 MINUTES 93 4 MINUTES    5 MINUTES 94 5 MINUTES    6 MINUTES 94 6 MINUTES               Distance Walked   Distance Walked   Dyspnea (Sanjana Scale)   Dyspnea (Sanjana Scale)   Fatigue (Sanjana Scale)   Fatigue (Sanjana Scale)   SpO2% Post Exercise   SpO2% Post Exercise   HR Post Exercise   HR Post Exercise   Time to Recovery   Time to Recovery     Comments: patient ambulated in the room for 6 minutes on room air with SpO2 ranging from 92%-96% the entire 6 minutes. Patient in no respiratory distress before during or after 6 minute walk. Patient does not meet requirements for at home oxygen at this time.

## 2025-03-29 NOTE — PROGRESS NOTES
[facility] MEDICINE SERVICE  DAILY PROGRESS NOTE    NAME: Kayla Huber  : 1970  MRN: [id number]      LOS: 3 days     PROVIDER OF SERVICE: GER Galindo    Chief Complaint: <principal problem not specified>    Subjective:     Interval History:  History taken from: patient    Patient otherwise doing well, reports cough is still present.  States that although she does feel better she does not feel as though she is quite ready to be discharged today.  Would like an additional day.  Patient is complaining of feeling diaphoretic and hot however does states she might be going through menopause.        Review of Systems:   Review of Systems   Constitutional:  Positive for diaphoresis. Negative for fever.   Respiratory:  Positive for cough. Negative for shortness of breath.    Cardiovascular:  Negative for chest pain.   Neurological:  Negative for dizziness and headaches.       Objective:     Vital Signs  Temp:  [98 °F (36.7 °C)-98.5 °F (36.9 °C)] 98 °F (36.7 °C)  Heart Rate:  [] 105  Resp:  [15-20] 15  BP: (135-155)/(87-94) 135/91  Flow (L/min) (Oxygen Therapy):  [2] 2   Body mass index is 28.06 kg/m².    Physical Exam  Physical Exam  General: 55 yo female, Alert and oriented, well nourished, no acute distress.  HENT: Normocephalic, normal hearing, moist oral mucosa, no scleral icterus.  Neck: Supple, nontender, no carotid bruits, no JVD, no LAD.  Lungs: bilateral expiratory wheezing noted, nonlabored respiration.  Heart: RRR, no murmur, gallop or edema.  Abdomen: Soft, nondistended.  Musculoskeletal: Normal range of motion and strength, no tenderness or swelling.  Skin: Skin is warm, dry and pink, no rashes or lesions.  Psychiatric: Cooperative, appropriate mood and affect.       Diagnostic Data    Results from last 7 days   Lab Units 25  0925 25  1716 25  0702   WBC 10*3/mm3 6.82  --   --    HEMOGLOBIN g/dL 13.0  --   --    HEMATOCRIT % 41.1  --   --    PLATELETS  10*3/mm3 186  --   --    GLUCOSE mg/dL 103*  --  95   CREATININE mg/dL 0.47*  --  0.47*   BUN mg/dL 3*  --  4*   SODIUM mmol/L 138  --  139   POTASSIUM mmol/L 4.2   < > 3.6   AST (SGOT) U/L  --   --  157*   ALT (SGPT) U/L  --   --  112*   ALK PHOS U/L  --   --  251*   BILIRUBIN mg/dL  --   --  0.9   ANION GAP mmol/L 12.2  --  10.7    < > = values in this interval not displayed.       No radiology results for the last day      I reviewed the patient's new clinical results.    Assessment/Plan:     Active and Resolved Problems  Active Hospital Problems    Diagnosis  POA    Pneumonia [J18.9]  Yes      Resolved Hospital Problems   No resolved problems to display.       Pneumonia  Acute hypoxic respiratory failure  Sepsis  -- Lactic elevated at OSH to peak of 4.9, given fluids, downtrending. Repeat-1.9  - CTA Chest at OSH no evidence of PE, + multifocal pneumonia  - Initially requiring 6L O2, presently on room air at time of this encounter  - 6 min walk shows she is stable on room air   - On albuterol at home, no diagnosis asthma/copd, +smoking Hx    - RVP Human Metapneumovirus  - 3 day course of abx with ceftriaxone completed today  - duonebs   - IVF as needed  - cont pulse ox       Elevated liver enzymes  Hepatic steatosis  Hx etoh abuse  - at OSH AlkPh 246, , , Tbili 1.3. CTAP done showing hepatic steatosis, no obstruction/mass/ascites reported.   - Infection as above, no reported hypotension from outside hospital though w/ recent hypoxia. Recent echo wnl as below  - Recently started on ASA/statin 2/2025, holding.   - Pt diagnosed with hepatic steatosis 10/2024, follows w/ GI Dr. Swan. + Hx daily etoh use for ~10 years, reports has significantly cut back ~5 years ago. Serum etoh negative at outside ED  - On previous eval noted to have elevated ferritin/abn iron profile. Seen by heme/onc, gene testing sent, no suggestion of hemochromatosis, elevated ferritin suspected 2/2 chronic liver disease  - GI  notified prior to transfer, consult placed, appreciate assistance  - Pending cmp,    -hepatitis panel-NEG  -acetaminophen/salicylate,   -abd ultrasound.Enlarged liver with increased hepatic parenchymal echogenicity most frequently seen with fatty infiltration.  - GI followed, have signed off and would like patient to follow up in clinic outpatient     Nonobstructive CAD  Syncope  - Stress test 12/4/24 w/ possible inferior wall defect  - LHC at [place] 2/26/25 w/ 40-50% lesion mid-LAD, 50% stenosis LCx. No PCI indicated  - TTE at [place] 2/24/25 EF 50-55%, normal LV size w/ concentric remodeling, normal RV size/function, no significant valvular disease.   - Follows with cardiology Dr. Horne, pending CCTA 3/31, will notify of [facility]  - holding statin/asa as above, cont metoprolol  - telemetry     Prediabetes  - A1C 6.2, had previously been wnl  - f/u outpt     Chronic vertigo  Depression  Anxiety  Chronic pain  - Continue home meds     Patient desired another day inpatient as she feels as though she is not quite ready for discharge. Patient with mild expiratory wheezing, will continue plan and monitor overnight.       VTE Prophylaxis:  Pharmacologic & mechanical VTE prophylaxis orders are present.             Disposition Planning:     Barriers to Discharge: continued care  Anticipated Date of Discharge: 3/30/2025  Place of Discharge: home      Time: 35 minutes     Code Status and Medical Interventions: CPR (Attempt to Resuscitate); Full Support   Ordered at: 03/26/25 1433     Code Status (Patient has no pulse and is not breathing):    CPR (Attempt to Resuscitate)     Medical Interventions (Patient has pulse or is breathing):    Full Support     Level Of Support Discussed With:    Patient       Signature: Electronically signed by GER Galindo, 03/29/25, 12:59 EDT.  [facility] Hospitalist Team

## 2025-03-29 NOTE — PLAN OF CARE
Goal Outcome Evaluation:   Pt came up to unit at 1256. Pt was feeling unsafe going home today. But will possibly DC today. Gave Tylenol for head ache.

## 2025-03-30 DIAGNOSIS — G47.09 OTHER INSOMNIA: ICD-10-CM

## 2025-03-30 PROCEDURE — 63710000001 PREDNISONE PER 5 MG: Performed by: HOSPITALIST

## 2025-03-30 PROCEDURE — 63710000001 PREDNISONE PER 1 MG: Performed by: HOSPITALIST

## 2025-03-30 PROCEDURE — 94799 UNLISTED PULMONARY SVC/PX: CPT

## 2025-03-30 PROCEDURE — 94640 AIRWAY INHALATION TREATMENT: CPT

## 2025-03-30 PROCEDURE — 25010000002 ONDANSETRON PER 1 MG: Performed by: STUDENT IN AN ORGANIZED HEALTH CARE EDUCATION/TRAINING PROGRAM

## 2025-03-30 PROCEDURE — 94761 N-INVAS EAR/PLS OXIMETRY MLT: CPT

## 2025-03-30 PROCEDURE — 94664 DEMO&/EVAL PT USE INHALER: CPT

## 2025-03-30 PROCEDURE — 25010000002 ENOXAPARIN PER 10 MG: Performed by: STUDENT IN AN ORGANIZED HEALTH CARE EDUCATION/TRAINING PROGRAM

## 2025-03-30 RX ORDER — IPRATROPIUM BROMIDE AND ALBUTEROL SULFATE 2.5; .5 MG/3ML; MG/3ML
3 SOLUTION RESPIRATORY (INHALATION)
Status: DISCONTINUED | OUTPATIENT
Start: 2025-03-30 | End: 2025-03-31 | Stop reason: HOSPADM

## 2025-03-30 RX ORDER — GUAIFENESIN 200 MG/10ML
200 LIQUID ORAL EVERY 4 HOURS PRN
Status: DISCONTINUED | OUTPATIENT
Start: 2025-03-30 | End: 2025-03-31 | Stop reason: HOSPADM

## 2025-03-30 RX ORDER — PREDNISONE 10 MG/1
10 TABLET ORAL DAILY
Status: DISCONTINUED | OUTPATIENT
Start: 2025-04-03 | End: 2025-03-31 | Stop reason: HOSPADM

## 2025-03-30 RX ORDER — PREDNISONE 20 MG/1
20 TABLET ORAL DAILY
Status: DISCONTINUED | OUTPATIENT
Start: 2025-04-01 | End: 2025-03-31 | Stop reason: HOSPADM

## 2025-03-30 RX ORDER — PANTOPRAZOLE SODIUM 40 MG/1
40 TABLET, DELAYED RELEASE ORAL
Status: DISCONTINUED | OUTPATIENT
Start: 2025-03-31 | End: 2025-03-31 | Stop reason: HOSPADM

## 2025-03-30 RX ORDER — ALUMINA, MAGNESIA, AND SIMETHICONE 2400; 2400; 240 MG/30ML; MG/30ML; MG/30ML
15 SUSPENSION ORAL EVERY 6 HOURS PRN
Status: DISCONTINUED | OUTPATIENT
Start: 2025-03-30 | End: 2025-03-31 | Stop reason: HOSPADM

## 2025-03-30 RX ADMIN — PREDNISONE 30 MG: 10 TABLET ORAL at 16:20

## 2025-03-30 RX ADMIN — ENOXAPARIN SODIUM 40 MG: 100 INJECTION SUBCUTANEOUS at 16:20

## 2025-03-30 RX ADMIN — Medication 10 ML: at 20:22

## 2025-03-30 RX ADMIN — Medication 100 MG: at 08:55

## 2025-03-30 RX ADMIN — Medication 5000 UNITS: at 08:56

## 2025-03-30 RX ADMIN — SORBITOL SOLUTION (BULK) 30 ML: 70 SOLUTION at 08:56

## 2025-03-30 RX ADMIN — Medication 250 MG: at 20:22

## 2025-03-30 RX ADMIN — ALUMINUM HYDROXIDE, MAGNESIUM HYDROXIDE, AND DIMETHICONE 15 ML: 400; 400; 40 SUSPENSION ORAL at 20:44

## 2025-03-30 RX ADMIN — AMITRIPTYLINE HYDROCHLORIDE 75 MG: 50 TABLET, FILM COATED ORAL at 20:22

## 2025-03-30 RX ADMIN — DULOXETINE 120 MG: 30 CAPSULE, DELAYED RELEASE ORAL at 08:55

## 2025-03-30 RX ADMIN — IPRATROPIUM BROMIDE AND ALBUTEROL SULFATE 3 ML: .5; 3 SOLUTION RESPIRATORY (INHALATION) at 20:59

## 2025-03-30 RX ADMIN — MECLIZINE HYDROCHLORIDE 25 MG: 25 TABLET ORAL at 10:40

## 2025-03-30 RX ADMIN — GUAIFENESIN 200 MG: 200 SOLUTION ORAL at 16:31

## 2025-03-30 RX ADMIN — GUAIFENESIN AND CODEINE PHOSPHATE 10 ML: 100; 10 SOLUTION ORAL at 10:44

## 2025-03-30 RX ADMIN — FOLIC ACID 1 MG: 1 TABLET ORAL at 08:55

## 2025-03-30 RX ADMIN — Medication 250 MG: at 08:55

## 2025-03-30 RX ADMIN — ONDANSETRON 4 MG: 2 INJECTION, SOLUTION INTRAMUSCULAR; INTRAVENOUS at 10:40

## 2025-03-30 RX ADMIN — Medication 10 ML: at 08:54

## 2025-03-30 NOTE — PLAN OF CARE
Goal Outcome Evaluation:                                            Problem: Adult Inpatient Plan of Care  Goal: Plan of Care Review  Outcome: Progressing  Flowsheets (Taken 3/26/2025 1655 by Sara Lowe, PT)  Outcome Evaluation: Pt is a 53 y/o F w hx of hepatic steatosis, etoh abuse, chronic vertigo, depression, anxiety, tobacco use, chronic back/left shoulder pain who presented to [facility] on 3/26/2025 as a transfer from [facility] for pneumonia/elevated liver enzymes. Pt went to see her PCP 3/20 for hospital follow up, at that time did report some SOA/cough/wheeze and was started on prednisone course but cont to have worsening symptoms, also reports fevers/chills/sore throat. She was seen at Urgent Care 3/25, found to be satting mid 80s, sent to [facility]. Diagnosed with sepsis/pneumonia there, started on abx with azithromycin and ceftriaxone. 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 During eval, pt demonstrates MOD I with bed mobility, requires supervision for transfers and supervision for gait training 100 ft with no AD, demonstrating ataxic gait with wide BINA, reduced earl. Pt with hx of BPPV (has been treated in OP PT) but not currently experiencing signs/symptoms of vertigo and with peripheral neurpathy BLE distal to knees. Pt appears to be functioning close to baseline, would benefit from OP PT to address balance/proprioception/BPPV but with no skilled acute care PT needs at this time. Eval only. PPE: gloves, mask, gown  Goal: Patient-Specific Goal (Individualized)  Outcome: Progressing  Goal: Absence of Hospital-Acquired Illness or Injury  Outcome: Progressing  Intervention: Identify and Manage Fall Risk  Recent Flowsheet Documentation  Taken 3/30/2025  1635 by Ali, Alysha, LPN  Safety Promotion/Fall Prevention:   safety round/check completed   room organization consistent   nonskid shoes/slippers when out of bed   mobility aid in reach   lighting adjusted   gait belt   fall prevention program maintained   clutter free environment maintained   assistive device/personal items within reach   activity supervised  Taken 3/30/2025 1416 by Alysha Olson LPN  Safety Promotion/Fall Prevention:   safety round/check completed   room organization consistent   nonskid shoes/slippers when out of bed   lighting adjusted   mobility aid in reach   gait belt   fall prevention program maintained   clutter free environment maintained   activity supervised   assistive device/personal items within reach  Intervention: Prevent Skin Injury  Recent Flowsheet Documentation  Taken 3/30/2025 1635 by Alysha Olson LPN  Body Position: position changed independently  Skin Protection: incontinence pads utilized  Intervention: Prevent Infection  Recent Flowsheet Documentation  Taken 3/30/2025 1635 by Alysha Olson LPN  Infection Prevention:   single patient room provided   rest/sleep promoted   hand hygiene promoted   equipment surfaces disinfected   environmental surveillance performed   cohorting utilized   personal protective equipment utilized  Taken 3/30/2025 1416 by Alysha Olson LPN  Infection Prevention:   single patient room provided   rest/sleep promoted   personal protective equipment utilized   hand hygiene promoted   environmental surveillance performed   equipment surfaces disinfected   cohorting utilized  Goal: Optimal Comfort and Wellbeing  Outcome: Progressing  Intervention: Provide Person-Centered Care  Recent Flowsheet Documentation  Taken 3/30/2025 1635 by Alysha Olson LPN  Trust Relationship/Rapport:   care explained   empathic listening provided   questions answered   reassurance provided   thoughts/feelings acknowledged   questions encouraged   emotional support provided    choices provided  Goal: Readiness for Transition of Care  Outcome: Progressing     Problem: Skin Injury Risk Increased  Goal: Skin Health and Integrity  Outcome: Progressing  Intervention: Optimize Skin Protection  Recent Flowsheet Documentation  Taken 3/30/2025 1635 by Alysha Olson LPN  Activity Management: activity encouraged  Pressure Reduction Techniques: frequent weight shift encouraged  Head of Bed (HOB) Positioning: HOB elevated  Pressure Reduction Devices: pressure-redistributing mattress utilized  Skin Protection: incontinence pads utilized     Problem: Fall Injury Risk  Goal: Absence of Fall and Fall-Related Injury  Outcome: Progressing  Intervention: Identify and Manage Contributors  Recent Flowsheet Documentation  Taken 3/30/2025 1635 by Alysha Olson LPN  Medication Review/Management: medications reviewed  Taken 3/30/2025 1416 by Alysha Olson LPN  Medication Review/Management: medications reviewed  Intervention: Promote Injury-Free Environment  Recent Flowsheet Documentation  Taken 3/30/2025 1635 by Alysha Olson LPN  Safety Promotion/Fall Prevention:   safety round/check completed   room organization consistent   nonskid shoes/slippers when out of bed   mobility aid in reach   lighting adjusted   gait belt   fall prevention program maintained   clutter free environment maintained   assistive device/personal items within reach   activity supervised  Taken 3/30/2025 1416 by Alysha Olson LPN  Safety Promotion/Fall Prevention:   safety round/check completed   room organization consistent   nonskid shoes/slippers when out of bed   lighting adjusted   mobility aid in reach   gait belt   fall prevention program maintained   clutter free environment maintained   activity supervised   assistive device/personal items within reach     Problem: Pneumonia  Goal: Fluid Balance  Outcome: Progressing  Goal: Absence of Infection Signs and Symptoms  Outcome: Progressing  Intervention: Prevent Infection Progression  Recent  Flowsheet Documentation  Taken 3/30/2025 1635 by Alysha Olson LPN  Isolation Precautions: precautions maintained  Taken 3/30/2025 1416 by Alysha Olson LPN  Isolation Precautions: precautions maintained  Goal: Effective Oxygenation and Ventilation  Outcome: Progressing  Intervention: Promote Airway Secretion Clearance  Recent Flowsheet Documentation  Taken 3/30/2025 1635 by Alysha Olson LPN  Activity Management: activity encouraged  Intervention: Optimize Oxygenation and Ventilation  Recent Flowsheet Documentation  Taken 3/30/2025 1635 by Alysha Olson LPN  Head of Bed (HOB) Positioning: HOB elevated

## 2025-03-30 NOTE — PLAN OF CARE
Problem: Adult Inpatient Plan of Care  Goal: Plan of Care Review  Outcome: Progressing   Goal Outcome Evaluation:        Pt rested well overnight, c/o cough, treated with prn medications with relief noted. Pt educated on current plan of care, verbalized understanding.

## 2025-03-30 NOTE — PROGRESS NOTES
[facility] MEDICINE SERVICE  DAILY PROGRESS NOTE    NAME: Kayla Huber  : 1970  MRN: [id number]      LOS: 4 days     PROVIDER OF SERVICE: Timothy Duane Brammell, MD    Chief Complaint: <principal problem not specified>    Subjective:     Interval History:  History taken from: patient    Patient generally feels bad.  Has cough.  Has subjective dyspnea at times.  Denies any home oxygen.  Denies any sputum production.  Eating poorly has not smoked for several months.  Denies any other additional acute issues.        Review of Systems:   Review of Systems   All other systems reviewed and are negative.      Objective:     Vital Signs  Temp:  [96.7 °F (35.9 °C)-98 °F (36.7 °C)] 96.7 °F (35.9 °C)  Heart Rate:  [] 94  Resp:  [14-21] 16  BP: (111-160)/() 111/63   Body mass index is 28.06 kg/m².    Physical Exam  Physical Exam  Vitals reviewed.   HENT:      Head: Normocephalic and atraumatic.   Cardiovascular:      Rate and Rhythm: Normal rate and regular rhythm.   Pulmonary:      Breath sounds: Wheezing present.      Comments: Coarse breath sound  Abdominal:      General: Bowel sounds are normal.      Palpations: Abdomen is soft.      Tenderness: There is no abdominal tenderness.   Neurological:      Mental Status: She is alert. Mental status is at baseline.            Diagnostic Data    Results from last 7 days   Lab Units 25  0925 25  1716 25  0702   WBC 10*3/mm3 6.82  --   --    HEMOGLOBIN g/dL 13.0  --   --    HEMATOCRIT % 41.1  --   --    PLATELETS 10*3/mm3 186  --   --    GLUCOSE mg/dL 103*  --  95   CREATININE mg/dL 0.47*  --  0.47*   BUN mg/dL 3*  --  4*   SODIUM mmol/L 138  --  139   POTASSIUM mmol/L 4.2   < > 3.6   AST (SGOT) U/L  --   --  157*   ALT (SGPT) U/L  --   --  112*   ALK PHOS U/L  --   --  251*   BILIRUBIN mg/dL  --   --  0.9   ANION GAP mmol/L 12.2  --  10.7    < > = values in this interval not displayed.       No radiology results for the last  day          Assessment:    COPD with acute exacerbation  Acute hypoxemic respiratory failure  Fatty liver  History of alcohol abuse  Coronary artery disease  Chronic pain issues  Human metapneumovirus       Plan.  Oral steroids.  Schedule nebulization therapy.  Follow-up labs.  Probable discharge home tomorrow with no worsening symptomatology.      Active and Resolved Problems  Active Hospital Problems    Diagnosis  POA    Pneumonia [J18.9]  Yes      Resolved Hospital Problems   No resolved problems to display.           VTE Prophylaxis:  Pharmacologic & mechanical VTE prophylaxis orders are present.             Disposition Planning:     Barriers to Discharge:SOB  Anticipated Date of Discharge: 3/31  Place of Discharge: home      Time: 30 minutes     Code Status and Medical Interventions: CPR (Attempt to Resuscitate); Full Support   Ordered at: 03/26/25 1433     Code Status (Patient has no pulse and is not breathing):    CPR (Attempt to Resuscitate)     Medical Interventions (Patient has pulse or is breathing):    Full Support     Level Of Support Discussed With:    Patient       Signature: Electronically signed by Timothy Duane Brammell, MD, 03/30/25, 15:23 EDT.  [facility] Hospitalist Team

## 2025-03-31 ENCOUNTER — READMISSION MANAGEMENT (OUTPATIENT)
Dept: CALL CENTER | Facility: HOSPITAL | Age: 55
End: 2025-03-31
Payer: MEDICAID

## 2025-03-31 VITALS
SYSTOLIC BLOOD PRESSURE: 120 MMHG | BODY MASS INDEX: 28.14 KG/M2 | TEMPERATURE: 98.2 F | HEIGHT: 69 IN | OXYGEN SATURATION: 93 % | HEART RATE: 107 BPM | RESPIRATION RATE: 16 BRPM | DIASTOLIC BLOOD PRESSURE: 73 MMHG | WEIGHT: 190 LBS

## 2025-03-31 LAB
ANION GAP SERPL CALCULATED.3IONS-SCNC: 14.7 MMOL/L (ref 5–15)
BASOPHILS # BLD AUTO: 0.03 10*3/MM3 (ref 0–0.2)
BASOPHILS NFR BLD AUTO: 0.3 % (ref 0–1.5)
BUN SERPL-MCNC: 11 MG/DL (ref 6–20)
BUN/CREAT SERPL: 18.6 (ref 7–25)
CALCIUM SPEC-SCNC: 10.8 MG/DL (ref 8.6–10.5)
CHLORIDE SERPL-SCNC: 94 MMOL/L (ref 98–107)
CO2 SERPL-SCNC: 23.3 MMOL/L (ref 22–29)
CREAT SERPL-MCNC: 0.59 MG/DL (ref 0.57–1)
DEPRECATED RDW RBC AUTO: 62.1 FL (ref 37–54)
EGFRCR SERPLBLD CKD-EPI 2021: 107.9 ML/MIN/1.73
EOSINOPHIL # BLD AUTO: 0.02 10*3/MM3 (ref 0–0.4)
EOSINOPHIL NFR BLD AUTO: 0.2 % (ref 0.3–6.2)
ERYTHROCYTE [DISTWIDTH] IN BLOOD BY AUTOMATED COUNT: 16.9 % (ref 12.3–15.4)
GLUCOSE SERPL-MCNC: 192 MG/DL (ref 65–99)
HCT VFR BLD AUTO: 40.8 % (ref 34–46.6)
HGB BLD-MCNC: 13 G/DL (ref 12–15.9)
IMM GRANULOCYTES # BLD AUTO: 0.31 10*3/MM3 (ref 0–0.05)
IMM GRANULOCYTES NFR BLD AUTO: 2.6 % (ref 0–0.5)
LYMPHOCYTES # BLD AUTO: 1.77 10*3/MM3 (ref 0.7–3.1)
LYMPHOCYTES NFR BLD AUTO: 15 % (ref 19.6–45.3)
MCH RBC QN AUTO: 31.7 PG (ref 26.6–33)
MCHC RBC AUTO-ENTMCNC: 31.9 G/DL (ref 31.5–35.7)
MCV RBC AUTO: 99.5 FL (ref 79–97)
MONOCYTES # BLD AUTO: 0.84 10*3/MM3 (ref 0.1–0.9)
MONOCYTES NFR BLD AUTO: 7.1 % (ref 5–12)
NEUTROPHILS NFR BLD AUTO: 74.8 % (ref 42.7–76)
NEUTROPHILS NFR BLD AUTO: 8.85 10*3/MM3 (ref 1.7–7)
NRBC BLD AUTO-RTO: 0.2 /100 WBC (ref 0–0.2)
PLATELET # BLD AUTO: 266 10*3/MM3 (ref 140–450)
PMV BLD AUTO: 10.3 FL (ref 6–12)
POTASSIUM SERPL-SCNC: 5 MMOL/L (ref 3.5–5.2)
RBC # BLD AUTO: 4.1 10*6/MM3 (ref 3.77–5.28)
SODIUM SERPL-SCNC: 132 MMOL/L (ref 136–145)
WBC NRBC COR # BLD AUTO: 11.82 10*3/MM3 (ref 3.4–10.8)

## 2025-03-31 PROCEDURE — 94618 PULMONARY STRESS TESTING: CPT

## 2025-03-31 PROCEDURE — 94799 UNLISTED PULMONARY SVC/PX: CPT

## 2025-03-31 PROCEDURE — 80048 BASIC METABOLIC PNL TOTAL CA: CPT | Performed by: HOSPITALIST

## 2025-03-31 PROCEDURE — 63710000001 PREDNISONE PER 1 MG: Performed by: HOSPITALIST

## 2025-03-31 PROCEDURE — 63710000001 PREDNISONE PER 5 MG: Performed by: HOSPITALIST

## 2025-03-31 PROCEDURE — 85025 COMPLETE CBC W/AUTO DIFF WBC: CPT | Performed by: HOSPITALIST

## 2025-03-31 RX ORDER — FOLIC ACID 1 MG/1
1 TABLET ORAL DAILY
Qty: 30 TABLET | Refills: 2 | Status: SHIPPED | OUTPATIENT
Start: 2025-04-01

## 2025-03-31 RX ORDER — PREDNISONE 10 MG/1
TABLET ORAL
Qty: 6 TABLET | Refills: 0 | Status: SHIPPED | OUTPATIENT
Start: 2025-04-01 | End: 2025-04-05

## 2025-03-31 RX ADMIN — IPRATROPIUM BROMIDE AND ALBUTEROL SULFATE 3 ML: .5; 3 SOLUTION RESPIRATORY (INHALATION) at 11:37

## 2025-03-31 RX ADMIN — PREDNISONE 30 MG: 10 TABLET ORAL at 09:11

## 2025-03-31 RX ADMIN — Medication 5000 UNITS: at 09:11

## 2025-03-31 RX ADMIN — FOLIC ACID 1 MG: 1 TABLET ORAL at 09:11

## 2025-03-31 RX ADMIN — Medication 250 MG: at 09:11

## 2025-03-31 RX ADMIN — Medication 100 MG: at 09:11

## 2025-03-31 RX ADMIN — DULOXETINE 120 MG: 30 CAPSULE, DELAYED RELEASE ORAL at 09:10

## 2025-03-31 RX ADMIN — PANTOPRAZOLE SODIUM 40 MG: 40 TABLET, DELAYED RELEASE ORAL at 09:11

## 2025-03-31 RX ADMIN — Medication 10 ML: at 09:12

## 2025-03-31 RX ADMIN — GUAIFENESIN 200 MG: 200 SOLUTION ORAL at 00:07

## 2025-03-31 NOTE — PAYOR COMM NOTE
"This is discharge notification for Ritesh Lacey  Reference/Auth # [id number]   Pt discharged on 3/31/25    Sandy Alicia, RN, BSN  Utilization Review Nurse  [facility]  Direct & confidential phone # [phone]  Fax # [phone]      Ritesh Lacey (54 y.o. Female)       Date of Birth   1970    Social Security Number       Address   [address] [place] [address] [place] [facility]    Home Phone   [phone]    MRN   [id number]       Catholic   Non-Episcopal    Marital Status   Single                            Admission Date   3/26/2025    Admission Type   Elective    Admitting Provider   Raman Hernandez MD    Attending Provider       Department, Room/Bed   [facility] 2D, 269/1       Discharge Date   3/31/2025    Discharge Disposition   Home or Self Care    Discharge Destination   Home                              Attending Provider: (none)   Allergies: Amoxicillin, Penicillins    Isolation: Contact, Droplet   Infection: Human Metapneumovirus  (03/26/25)   Code Status: CPR    Ht: 175.3 cm (69\")   Wt: 86.2 kg (190 lb)    Admission Cmt: None   Principal Problem: None                  Active Insurance as of 3/26/2025       Primary Coverage       Payor Plan Insurance Group Employer/Plan Group    [id number]       Payor Plan Address Payor Plan Phone Number Payor Plan Fax Number Effective Dates    MAIL STOP:   3/1/2019 - None Entered    [address] [address]       [facility] [address]         Subscriber Name Subscriber Birth Date Member ID       RITESH LACEY 1970 [id number]                     Emergency Contacts        (Rel.) Home Phone Work Phone Mobile Phone    JUAN LUIS BOWDEN (Mother) [phone] -- [phone]                 Discharge Summary        Brammell, Timothy Duane, MD at 03/31/25 [address] [place] [address] Medicine Services  Discharge Summary    Date of Service: " 3/31/2025  Patient Name: Kayla Huber  : 1970  MRN: [id number]    Date of Admission: 3/26/2025  Discharge Diagnosis:     COPD with acute exacerbation  Acute hypoxemic respiratory failure  Fatty liver  History of alcohol abuse  Coronary artery disease  Probable pneumonia  Chronic pain issues  Human metapneumovirus  Date of Discharge: 3/31/2025  Primary Care Physician: Chantal Benitez DO      Presenting Problem:   Pneumonia [J18.9]    Active and Resolved Hospital Problems:  Active Hospital Problems    Diagnosis POA    Pneumonia [J18.9] Yes      Resolved Hospital Problems   No resolved problems to display.         Hospital Course     HPI:    Patient presented with increased shortness of breath and found to have human metapneumovirus as discussed in history and physical of 2025.    Hospital Course:   This is a 54-year-old white female who presented with issues as discussed in history and physical.  She had been transferred from outlying facility with her shortness of breath.  I cared for patient later in her hospitalization.  She had no true temperature spike during her hospitalization.  She required some initial nasal cannula supplementation and was able to be weaned to room air.  Respiratory viral panel returned being positive for human metapneumovirus.  Strep and Legionella urinary antigens were negative.  Chest x-ray was performed prior to transfer.  Due to liver function abnormalities ultrasound of abdomen was performed that showed hepatomegaly with fatty liver changes.  She was seen in evaluation by gastroenterology.  Iron saturation returned being 30%.  She had a moderate anemia on presentation.  Viral hepatitis studies returned being negative.  Additional hepatic serologies studies were negative.  B12 level was normal.  Folic acid level is mildly decreased.  She had some chronic dizziness issues.  Completed short course of antibiotics as noted in electronic medical records.  She had  some ongoing shortness of breath and I scheduled nebulization therapy as well as oral prednisone with significant improvement.  She required no oxygen supplementation at time of discharge.          Day of Discharge     Vital Signs:  Temp:  [98 °F (36.7 °C)-98.6 °F (37 °C)] 98.2 °F (36.8 °C)  Heart Rate:  [] 107  Resp:  [10-16] 16  BP: (111-137)/(70-89) 120/73  Flow (L/min) (Oxygen Therapy):  [2] 2    Physical Exam:  Physical Exam  Vitals reviewed.   Constitutional:       General: She is not in acute distress.  HENT:      Head: Normocephalic.   Cardiovascular:      Rate and Rhythm: Normal rate and regular rhythm.   Pulmonary:      Effort: Pulmonary effort is normal.      Breath sounds: Normal breath sounds.   Abdominal:      General: Bowel sounds are normal.      Palpations: Abdomen is soft.      Tenderness: There is no abdominal tenderness.   Musculoskeletal:         General: No swelling.   Neurological:      Mental Status: She is alert. Mental status is at baseline.            Pertinent  and/or Most Recent Results     LAB RESULTS:      Lab 03/31/25  0645 03/29/25  0925 03/27/25  0049 03/26/25  2135 03/26/25  1802 03/26/25  1500   WBC 11.82* 6.82 6.64  --   --  5.73   HEMOGLOBIN 13.0 13.0 11.5*  --   --  10.9*   HEMATOCRIT 40.8 41.1 37.0  --   --  34.8   PLATELETS 266 186 131*  --   --  127*   NEUTROS ABS 8.85* 4.36 4.45  --   --  3.28   IMMATURE GRANS (ABS) 0.31*  --  0.18*  --   --  0.23*   LYMPHS ABS 1.77  --  1.44  --   --  1.67   MONOS ABS 0.84  --  0.54  --   --  0.53   EOS ABS 0.02 0.14 0.01  --   --  0.00   MCV 99.5* 100.0* 100.3*  --   --  100.3*   LACTATE  --   --  1.9 2.5* 3.2* 2.9*   LDH  --   --  384*  --   --   --    PROTIME  --   --   --   --   --  14.1         Lab 03/31/25  0023 03/29/25  0925 03/28/25  1716 03/28/25  0702 03/27/25  0049 03/26/25  1500   SODIUM 132* 138  --  139 135* 138   POTASSIUM 5.0 4.2 4.5 3.6 4.3 3.3*   CHLORIDE 94* 99  --  101 102 102   CO2 23.3 26.8  --  27.3 21.9* 23.8    ANION GAP 14.7 12.2  --  10.7 11.1 12.2   BUN 11 3*  --  4* 6 7   CREATININE 0.59 0.47*  --  0.47* 0.51* 0.54*   EGFR 107.9 114.0  --  114.0 111.8 110.2   GLUCOSE 192* 103*  --  95 139* 106*   CALCIUM 10.8* 9.4  --  8.6 8.5* 8.5*         Lab 03/28/25  0702 03/27/25  0049 03/26/25  1500   TOTAL PROTEIN 6.0 5.9* 5.7*   ALBUMIN 3.4* 3.5 3.4*   GLOBULIN 2.6 2.4 2.3   ALT (SGPT) 112* 149* 157*   AST (SGOT) 157* 322* 360*   BILIRUBIN 0.9 1.0 0.9   ALK PHOS 251* 273* 274*         Lab 03/26/25  1500   PROTIME 14.1   INR 1.10             Lab 03/27/25  0049 03/26/25  1500   IRON  --  95   IRON SATURATION (TSAT)  --  30   TIBC  --  319   TRANSFERRIN  --  214   FOLATE 4.35*  --    VITAMIN B 12 542  --          Brief Urine Lab Results  (Last result in the past 365 days)        Color   Clarity   Blood   Leuk Est   Nitrite   Protein   CREAT   Urine HCG        12/12/24 1152 Dark Yellow   Slightly Cloudy   Negative   Trace   Positive   30 mg/dL (1+)                 Microbiology Results (last 10 days)       Procedure Component Value - Date/Time    S. Pneumo Ag Urine or CSF - Urine, Urine, Clean Catch [[id number]]  (Normal) Collected: 03/26/25 1810    Lab Status: Final result Specimen: Urine, Clean Catch Updated: 03/26/25 1858     Strep Pneumo Ag Negative    Legionella Antigen, Urine - Urine, Urine, Clean Catch [[id number]]  (Normal) Collected: 03/26/25 1810    Lab Status: Final result Specimen: Urine, Clean Catch Updated: 03/26/25 1858     LEGIONELLA ANTIGEN, URINE Negative    Respiratory Panel PCR w/COVID-19(SARS-CoV-2) [facility] In-House, NP Swab in UTM/VTM, 2 HR TAT - Swab, Nasopharynx [[id number]]  (Abnormal) Collected: 03/26/25 1455    Lab Status: Final result Specimen: Swab from Nasopharynx Updated: 03/26/25 1652     ADENOVIRUS, PCR Not Detected     Coronavirus 229E Not Detected     Coronavirus HKU1 Not Detected     Coronavirus NL63 Not Detected     Coronavirus OC43 Not Detected     COVID19 Not Detected     Human  Metapneumovirus Detected     Human Rhinovirus/Enterovirus Not Detected     Influenza A PCR Not Detected     Influenza B PCR Not Detected     Parainfluenza Virus 1 Not Detected     Parainfluenza Virus 2 Not Detected     Parainfluenza Virus 3 Not Detected     Parainfluenza Virus 4 Not Detected     RSV, PCR Not Detected     Bordetella pertussis pcr Not Detected     Bordetella parapertussis PCR Not Detected     Chlamydophila pneumoniae PCR Not Detected     Mycoplasma pneumo by PCR Not Detected    Narrative:      In the setting of a positive respiratory panel with a viral infection PLUS a negative procalcitonin without other underlying concern for bacterial infection, consider observing off antibiotics or discontinuation of antibiotics and continue supportive care. If the respiratory panel is positive for atypical bacterial infection (Bordetella pertussis, Chlamydophila pneumoniae, or Mycoplasma pneumoniae), consider antibiotic de-escalation to target atypical bacterial infection.            XR Abdomen KUB  Result Date: 3/26/2025  Impression: Impression: 1.Nonspecific nonobstructive bowel gas pattern. Mild colonic stool burden. 2.Hepatomegaly. Electronically Signed: Alcon Odonnell  3/26/2025 6:44 PM EDT  Workstation ID: [id number]    US Abdomen Limited  Result Date: 3/26/2025  Impression: Impression: Enlarged liver with increased hepatic parenchymal echogenicity most frequently seen with fatty infiltration. Electronically Signed: Shreyas Herr MD  3/26/2025 4:08 PM EDT  Workstation ID: [id number]                  Labs Pending at Discharge:  Pending Results       Procedure [Order ID] Specimen - Date/Time    Potassium [[id number]]     Specimen: Blood             Procedures Performed    03/31 1147 Walking Oximetry  03/29 1129 Note By: Valerie Walker, Respiratory Tech Student      Consults:   Consults       Date and Time Order Name Status Description    3/26/2025  2:33 PM Inpatient Gastroenterology Consult Completed                Discharge Details        Discharge Medications        ASK your doctor about these medications        Instructions Start Date   amitriptyline 75 MG tablet  Commonly known as: ELAVIL   75 mg, Oral, Nightly      aspirin 81 MG EC tablet   81 mg, Daily      atorvastatin 40 MG tablet  Commonly known as: LIPITOR   40 mg, Daily      benzonatate 100 MG capsule  Commonly known as: Tessalon Perles   200 mg, Oral, 3 Times Daily PRN      cetirizine 10 MG tablet  Commonly known as: zyrTEC   10 mg, Oral, Daily      dicyclomine 10 MG capsule  Commonly known as: BENTYL   10 mg, Oral, 4 Times Daily Before Meals & Nightly      DULoxetine 60 MG capsule  Commonly known as: CYMBALTA   120 mg, Oral, Daily      Fluticasone Propionate Diskus 50 MCG/ACT aerosol powder    1 puff, Inhalation, 2 Times Daily      meclizine 25 MG tablet  Commonly known as: ANTIVERT   25 mg, 3 Times Daily PRN      meloxicam 15 MG tablet  Commonly known as: MOBIC   1 tablet, Daily      metoprolol tartrate 100 MG tablet  Commonly known as: LOPRESSOR   Take 100 mg at Bedtime the Night Before Coronary CTA Appointment and In the Morning 1 Hour Prior to Coronary CTA Appointment. Do not take if heart rate less than 60.      ondansetron ODT 4 MG disintegrating tablet  Commonly known as: ZOFRAN-ODT   4 mg, Translingual, Every 8 Hours PRN      pantoprazole 40 MG EC tablet  Commonly known as: PROTONIX   TAKE 1 TABLET BY MOUTH ONCE DAILY 1/2 TO 1 HOUR BEFORE MORNING MEAL      predniSONE 10 MG tablet  Commonly known as: DELTASONE  Ask about: Should I take this medication?   40 mg, Oral, Daily      rizatriptan 10 MG tablet  Commonly known as: MAXALT   Take 1 tablet by mouth As Needed.      rOPINIRole 2 MG tablet  Commonly known as: REQUIP   Take 2 tablets by mouth Every Night.      Scopolamine 1 MG/3DAYS patch   1 patch, Every 72 Hours      sucralfate 1 g tablet  Commonly known as: CARAFATE   1 g, 3 Times Daily      tiZANidine 2 MG tablet  Commonly known as: ZANAFLEX    2 mg, Nightly PRN      topiramate 200 MG tablet  Commonly known as: TOPAMAX   200 mg, 2 Times Daily      Umeclidinium Bromide 62.5 MCG/ACT aerosol powder   Commonly known as: INCRUSE ELLIPTA   1 puff, Inhalation, Daily      varenicline 1 MG tablet  Commonly known as: CHANTIX   1 mg, 2 Times Daily      Ventolin  (90 Base) MCG/ACT inhaler  Generic drug: albuterol sulfate HFA   2 puffs, Inhalation, Every 4 Hours PRN      albuterol 0.63 MG/3ML nebulizer solution  Commonly known as: ACCUNEB   Nebulization, Every 6 Hours PRN               Allergies   Allergen Reactions    Amoxicillin Unknown - Low Severity    Penicillins Hives     CAN TAKE KEFLEX AND AMOXICILLIN         Discharge Disposition: home  Home or Self Care    Diet:  Hospital:  Diet Order   Procedures    Diet: Cardiac; Healthy Heart (2-3 Na+); Fluid Consistency: Thin (IDDSI 0)         Discharge Activity:         CODE STATUS:  Code Status and Medical Interventions: CPR (Attempt to Resuscitate); Full Support   Ordered at: 03/26/25 1433     Code Status (Patient has no pulse and is not breathing):    CPR (Attempt to Resuscitate)     Medical Interventions (Patient has pulse or is breathing):    Full Support     Level Of Support Discussed With:    Patient         Future Appointments   Date Time Provider Department Center   4/3/2025  1:45 PM Chantal Benitez DO [facility]   5/15/2025 11:00 AM LAB [facility] LAB [facility]   5/22/2025 11:00 AM Ryan Guzmán MD [facility]   5/22/2025 11:00 AM V[facility] ONLY ONC LAB NA [facility]   7/10/2025 11:15 AM Chantal Benitez DO [facility]   7/14/2025 11:00 AM Dimitris Manriquez MD [facility]           Time spent on Discharge including face to face service:  45 minutes    Signature: Electronically signed by Timothy Duane Brammell, MD, 03/31/25, 13:31 EDT.  [facility], [place], operated by [facility] Hospitalist Team     Electronically signed by Brammell, Timothy Duane, MD at 03/31/25 9230

## 2025-03-31 NOTE — PROCEDURES
Exercise Oximetry    Patient Name:Kayla Huber   MRN: [id number]   Date: 03/31/25             ROOM AIR BASELINE   SpO2% 98   Heart Rate 114   Blood Pressure      EXERCISE ON ROOM AIR SpO2% EXERCISE ON O2 @  LPM SpO2%   1 MINUTE 96 1 MINUTE    2 MINUTES 95 2 MINUTES    3 MINUTES 94 3 MINUTES    4 MINUTES 95 4 MINUTES    5 MINUTES 94 5 MINUTES    6 MINUTES 94 6 MINUTES               Distance Walked   Distance Walked   Dyspnea (Sanjana Scale)   Dyspnea (Sanjana Scale)   Fatigue (Sanjana Scale)   Fatigue (Sanjana Scale)   SpO2% Post Exercise  98 SpO2% Post Exercise   HR Post Exercise  120 HR Post Exercise   Time to Recovery   Time to Recovery     Comments: At this time patient does not need oxygen.

## 2025-03-31 NOTE — DISCHARGE SUMMARY
[facility] Medicine Services  Discharge Summary    Date of Service: 3/31/2025  Patient Name: Kayla Huber  : 1970  MRN: [id number]    Date of Admission: 3/26/2025  Discharge Diagnosis:     COPD with acute exacerbation  Acute hypoxemic respiratory failure  Fatty liver  History of alcohol abuse  Coronary artery disease  Probable pneumonia  Chronic pain issues  Human metapneumovirus  Date of Discharge: 3/31/2025  Primary Care Physician: Chantal Benitez DO      Presenting Problem:   Pneumonia [J18.9]    Active and Resolved Hospital Problems:  Active Hospital Problems    Diagnosis POA    Pneumonia [J18.9] Yes      Resolved Hospital Problems   No resolved problems to display.         Hospital Course     HPI:    Patient presented with increased shortness of breath and found to have human metapneumovirus as discussed in history and physical of 2025.    Hospital Course:   This is a 54-year-old white female who presented with issues as discussed in history and physical.  She had been transferred from outlying facility with her shortness of breath.  I cared for patient later in her hospitalization.  She had no true temperature spike during her hospitalization.  She required some initial nasal cannula supplementation and was able to be weaned to room air.  Respiratory viral panel returned being positive for human metapneumovirus.  Strep and Legionella urinary antigens were negative.  Chest x-ray was performed prior to transfer.  Due to liver function abnormalities ultrasound of abdomen was performed that showed hepatomegaly with fatty liver changes.  She was seen in evaluation by gastroenterology.  Iron saturation returned being 30%.  She had a moderate anemia on presentation.  Viral hepatitis studies returned being negative.  Additional hepatic serologies studies were negative.  B12 level was normal.  Folic acid level is mildly decreased.  She had some chronic dizziness issues.   Completed short course of antibiotics as noted in electronic medical records.  She had some ongoing shortness of breath and I scheduled nebulization therapy as well as oral prednisone with significant improvement.  She required no oxygen supplementation at time of discharge.          Day of Discharge     Vital Signs:  Temp:  [98 °F (36.7 °C)-98.6 °F (37 °C)] 98.2 °F (36.8 °C)  Heart Rate:  [] 107  Resp:  [10-16] 16  BP: (111-137)/(70-89) 120/73  Flow (L/min) (Oxygen Therapy):  [2] 2    Physical Exam:  Physical Exam  Vitals reviewed.   Constitutional:       General: She is not in acute distress.  HENT:      Head: Normocephalic.   Cardiovascular:      Rate and Rhythm: Normal rate and regular rhythm.   Pulmonary:      Effort: Pulmonary effort is normal.      Breath sounds: Normal breath sounds.   Abdominal:      General: Bowel sounds are normal.      Palpations: Abdomen is soft.      Tenderness: There is no abdominal tenderness.   Musculoskeletal:         General: No swelling.   Neurological:      Mental Status: She is alert. Mental status is at baseline.            Pertinent  and/or Most Recent Results     LAB RESULTS:      Lab 03/31/25  0645 03/29/25  0925 03/27/25  0049 03/26/25  2135 03/26/25  1802 03/26/25  1500   WBC 11.82* 6.82 6.64  --   --  5.73   HEMOGLOBIN 13.0 13.0 11.5*  --   --  10.9*   HEMATOCRIT 40.8 41.1 37.0  --   --  34.8   PLATELETS 266 186 131*  --   --  127*   NEUTROS ABS 8.85* 4.36 4.45  --   --  3.28   IMMATURE GRANS (ABS) 0.31*  --  0.18*  --   --  0.23*   LYMPHS ABS 1.77  --  1.44  --   --  1.67   MONOS ABS 0.84  --  0.54  --   --  0.53   EOS ABS 0.02 0.14 0.01  --   --  0.00   MCV 99.5* 100.0* 100.3*  --   --  100.3*   LACTATE  --   --  1.9 2.5* 3.2* 2.9*   LDH  --   --  384*  --   --   --    PROTIME  --   --   --   --   --  14.1         Lab 03/31/25  0023 03/29/25  0925 03/28/25  1716 03/28/25  0702 03/27/25  0049 03/26/25  1500   SODIUM 132* 138  --  139 135* 138   POTASSIUM 5.0 4.2  4.5 3.6 4.3 3.3*   CHLORIDE 94* 99  --  101 102 102   CO2 23.3 26.8  --  27.3 21.9* 23.8   ANION GAP 14.7 12.2  --  10.7 11.1 12.2   BUN 11 3*  --  4* 6 7   CREATININE 0.59 0.47*  --  0.47* 0.51* 0.54*   EGFR 107.9 114.0  --  114.0 111.8 110.2   GLUCOSE 192* 103*  --  95 139* 106*   CALCIUM 10.8* 9.4  --  8.6 8.5* 8.5*         Lab 03/28/25  0702 03/27/25  0049 03/26/25  1500   TOTAL PROTEIN 6.0 5.9* 5.7*   ALBUMIN 3.4* 3.5 3.4*   GLOBULIN 2.6 2.4 2.3   ALT (SGPT) 112* 149* 157*   AST (SGOT) 157* 322* 360*   BILIRUBIN 0.9 1.0 0.9   ALK PHOS 251* 273* 274*         Lab 03/26/25  1500   PROTIME 14.1   INR 1.10             Lab 03/27/25  0049 03/26/25  1500   IRON  --  95   IRON SATURATION (TSAT)  --  30   TIBC  --  319   TRANSFERRIN  --  214   FOLATE 4.35*  --    VITAMIN B 12 542  --          Brief Urine Lab Results  (Last result in the past 365 days)        Color   Clarity   Blood   Leuk Est   Nitrite   Protein   CREAT   Urine HCG        12/12/24 1152 Dark Yellow   Slightly Cloudy   Negative   Trace   Positive   30 mg/dL (1+)                 Microbiology Results (last 10 days)       Procedure Component Value - Date/Time    S. Pneumo Ag Urine or CSF - Urine, Urine, Clean Catch [[id number]]  (Normal) Collected: 03/26/25 1810    Lab Status: Final result Specimen: Urine, Clean Catch Updated: 03/26/25 1858     Strep Pneumo Ag Negative    Legionella Antigen, Urine - Urine, Urine, Clean Catch [[id number]]  (Normal) Collected: 03/26/25 1810    Lab Status: Final result Specimen: Urine, Clean Catch Updated: 03/26/25 1858     LEGIONELLA ANTIGEN, URINE Negative    Respiratory Panel PCR w/COVID-19(SARS-CoV-2) [facility]I In-House, NP Swab in UTM/VTM, 2 HR TAT - Swab, Nasopharynx [[id number]]  (Abnormal) Collected: 03/26/25 1455    Lab Status: Final result Specimen: Swab from Nasopharynx Updated: 03/26/25 0042     ADENOVIRUS, PCR Not Detected     Coronavirus 229E Not Detected     Coronavirus HKU1 Not Detected     Coronavirus  NL63 Not Detected     Coronavirus OC43 Not Detected     COVID19 Not Detected     Human Metapneumovirus Detected     Human Rhinovirus/Enterovirus Not Detected     Influenza A PCR Not Detected     Influenza B PCR Not Detected     Parainfluenza Virus 1 Not Detected     Parainfluenza Virus 2 Not Detected     Parainfluenza Virus 3 Not Detected     Parainfluenza Virus 4 Not Detected     RSV, PCR Not Detected     Bordetella pertussis pcr Not Detected     Bordetella parapertussis PCR Not Detected     Chlamydophila pneumoniae PCR Not Detected     Mycoplasma pneumo by PCR Not Detected    Narrative:      In the setting of a positive respiratory panel with a viral infection PLUS a negative procalcitonin without other underlying concern for bacterial infection, consider observing off antibiotics or discontinuation of antibiotics and continue supportive care. If the respiratory panel is positive for atypical bacterial infection (Bordetella pertussis, Chlamydophila pneumoniae, or Mycoplasma pneumoniae), consider antibiotic de-escalation to target atypical bacterial infection.            XR Abdomen KUB  Result Date: 3/26/2025  Impression: Impression: 1.Nonspecific nonobstructive bowel gas pattern. Mild colonic stool burden. 2.Hepatomegaly. Electronically Signed: Alcon Odonnell  3/26/2025 6:44 PM EDT  Workstation ID: [id number]    US Abdomen Limited  Result Date: 3/26/2025  Impression: Impression: Enlarged liver with increased hepatic parenchymal echogenicity most frequently seen with fatty infiltration. Electronically Signed: Shreyas Herr MD  3/26/2025 4:08 PM EDT  Workstation ID: [id number]                  Labs Pending at Discharge:  Pending Results       Procedure [Order ID] Specimen - Date/Time    Potassium [[id number]]     Specimen: Blood             Procedures Performed    03/31 1147 Walking Oximetry  03/29 1129 Note By: Valerie Walker, Respiratory Tech Student      Consults:   Consults       Date and Time Order Name Status  Description    3/26/2025  2:33 PM Inpatient Gastroenterology Consult Completed               Discharge Details        Discharge Medications        ASK your doctor about these medications        Instructions Start Date   amitriptyline 75 MG tablet  Commonly known as: ELAVIL   75 mg, Oral, Nightly      aspirin 81 MG EC tablet   81 mg, Daily      atorvastatin 40 MG tablet  Commonly known as: LIPITOR   40 mg, Daily      benzonatate 100 MG capsule  Commonly known as: Tessalon Perles   200 mg, Oral, 3 Times Daily PRN      cetirizine 10 MG tablet  Commonly known as: zyrTEC   10 mg, Oral, Daily      dicyclomine 10 MG capsule  Commonly known as: BENTYL   10 mg, Oral, 4 Times Daily Before Meals & Nightly      DULoxetine 60 MG capsule  Commonly known as: CYMBALTA   120 mg, Oral, Daily      Fluticasone Propionate Diskus 50 MCG/ACT aerosol powder    1 puff, Inhalation, 2 Times Daily      meclizine 25 MG tablet  Commonly known as: ANTIVERT   25 mg, 3 Times Daily PRN      meloxicam 15 MG tablet  Commonly known as: MOBIC   1 tablet, Daily      metoprolol tartrate 100 MG tablet  Commonly known as: LOPRESSOR   Take 100 mg at Bedtime the Night Before Coronary CTA Appointment and In the Morning 1 Hour Prior to Coronary CTA Appointment. Do not take if heart rate less than 60.      ondansetron ODT 4 MG disintegrating tablet  Commonly known as: ZOFRAN-ODT   4 mg, Translingual, Every 8 Hours PRN      pantoprazole 40 MG EC tablet  Commonly known as: PROTONIX   TAKE 1 TABLET BY MOUTH ONCE DAILY 1/2 TO 1 HOUR BEFORE MORNING MEAL      predniSONE 10 MG tablet  Commonly known as: DELTASONE  Ask about: Should I take this medication?   40 mg, Oral, Daily      rizatriptan 10 MG tablet  Commonly known as: MAXALT   Take 1 tablet by mouth As Needed.      rOPINIRole 2 MG tablet  Commonly known as: REQUIP   Take 2 tablets by mouth Every Night.      Scopolamine 1 MG/3DAYS patch   1 patch, Every 72 Hours      sucralfate 1 g tablet  Commonly known as:  CARAFATE   1 g, 3 Times Daily      tiZANidine 2 MG tablet  Commonly known as: ZANAFLEX   2 mg, Nightly PRN      topiramate 200 MG tablet  Commonly known as: TOPAMAX   200 mg, 2 Times Daily      Umeclidinium Bromide 62.5 MCG/ACT aerosol powder   Commonly known as: INCRUSE ELLIPTA   1 puff, Inhalation, Daily      varenicline 1 MG tablet  Commonly known as: CHANTIX   1 mg, 2 Times Daily      Ventolin  (90 Base) MCG/ACT inhaler  Generic drug: albuterol sulfate HFA   2 puffs, Inhalation, Every 4 Hours PRN      albuterol 0.63 MG/3ML nebulizer solution  Commonly known as: ACCUNEB   Nebulization, Every 6 Hours PRN               Allergies   Allergen Reactions    Amoxicillin Unknown - Low Severity    Penicillins Hives     CAN TAKE KEFLEX AND AMOXICILLIN         Discharge Disposition: home  Home or Self Care    Diet:  Hospital:  Diet Order   Procedures    Diet: Cardiac; Healthy Heart (2-3 Na+); Fluid Consistency: Thin (IDDSI 0)         Discharge Activity:         CODE STATUS:  Code Status and Medical Interventions: CPR (Attempt to Resuscitate); Full Support   Ordered at: 03/26/25 1433     Code Status (Patient has no pulse and is not breathing):    CPR (Attempt to Resuscitate)     Medical Interventions (Patient has pulse or is breathing):    Full Support     Level Of Support Discussed With:    Patient         Future Appointments   Date Time Provider Department Center   4/3/2025  1:45 PM Chantal Benitez DO [facility]   5/15/2025 11:00 AM LAB [facility] LAB [facility]   5/22/2025 11:00 AM Ryan Guzmán MD [facility]   5/22/2025 11:00 AM [facility] ONLY ONC LAB NA [facility]   7/10/2025 11:15 AM Chantal Benitez DO [facility]   7/14/2025 11:00 AM Dimitris Manriquez MD [facility]           Time spent on Discharge including face to face service:  45 minutes    Signature: Electronically signed by Timothy Duane Brammell, MD, 03/31/25, 13:31 EDT.  [facility] Hospitalist  Team

## 2025-03-31 NOTE — OUTREACH NOTE
Prep Survey      Flowsheet Row Responses   [facility] patient discharged from? [facility]   Is LACE score < 7 ? No   Eligibility [facility]   Date of Admission 03/26/25   Date of Discharge 03/31/25   Discharge Disposition Home or Self Care   Discharge diagnosis COPD with acute exacerbation, Pneumonia   Does the patient have one of the following disease processes/diagnoses(primary or secondary)? Pneumonia   Does the patient have Home health ordered? No   Is there a DME ordered? No   Prep survey completed? Yes            Veronica OLIVA - Registered Nurse

## 2025-03-31 NOTE — PLAN OF CARE
Problem: Adult Inpatient Plan of Care  Goal: Plan of Care Review  Outcome: Progressing  Flowsheets  Taken 3/31/2025 0425 by Ebenezer Sotelo LPN  Progress: improving  Taken 3/28/2025 0456 by Alfonso Ryan RN  Plan of Care Reviewed With: patient  Goal: Patient-Specific Goal (Individualized)  Outcome: Progressing  Goal: Absence of Hospital-Acquired Illness or Injury  Outcome: Progressing  Intervention: Identify and Manage Fall Risk  Recent Flowsheet Documentation  Taken 3/31/2025 0400 by Ebenezer Sotelo LPN  Safety Promotion/Fall Prevention: safety round/check completed  Taken 3/31/2025 0158 by Ebenezer Sotelo LPN  Safety Promotion/Fall Prevention: safety round/check completed  Taken 3/31/2025 0000 by Ebenezer Sotelo LPN  Safety Promotion/Fall Prevention: safety round/check completed  Taken 3/30/2025 2200 by Ebenezer Sotelo LPN  Safety Promotion/Fall Prevention: safety round/check completed  Taken 3/30/2025 2000 by Ebenezer Sotelo LPN  Safety Promotion/Fall Prevention: safety round/check completed  Intervention: Prevent Skin Injury  Recent Flowsheet Documentation  Taken 3/31/2025 0400 by Ebenezer Sotelo LPN  Body Position: position changed independently  Skin Protection: incontinence pads utilized  Taken 3/31/2025 0000 by Ebenezer Sotelo LPN  Body Position: position changed independently  Skin Protection: incontinence pads utilized  Taken 3/30/2025 2000 by Ebenezer Sotelo LPN  Body Position: position changed independently  Skin Protection: incontinence pads utilized  Intervention: Prevent Infection  Recent Flowsheet Documentation  Taken 3/31/2025 0400 by Ebenezer Sotelo LPN  Infection Prevention: rest/sleep promoted  Taken 3/31/2025 0158 by Ebenezer Sotelo LPN  Infection Prevention: rest/sleep promoted  Taken 3/31/2025 0000 by Ebenezer Sotelo LPN  Infection Prevention: rest/sleep promoted  Taken 3/30/2025 2200 by Ebenezer Sotelo LPN  Infection Prevention: rest/sleep promoted  Taken 3/30/2025 2000 by Deepak  Oechsli, LPN  Infection Prevention: rest/sleep promoted  Goal: Optimal Comfort and Wellbeing  Outcome: Progressing  Goal: Readiness for Transition of Care  Outcome: Progressing     Problem: Skin Injury Risk Increased  Goal: Skin Health and Integrity  Outcome: Progressing  Intervention: Optimize Skin Protection  Recent Flowsheet Documentation  Taken 3/31/2025 0400 by Ebenezer Sotelo LPN  Pressure Reduction Techniques: frequent weight shift encouraged  Head of Bed (HOB) Positioning: HOB elevated  Pressure Reduction Devices: pressure-redistributing mattress utilized  Skin Protection: incontinence pads utilized  Taken 3/31/2025 0000 by Ebenezer Sotelo LPN  Pressure Reduction Techniques: frequent weight shift encouraged  Head of Bed (HOB) Positioning: HOB elevated  Pressure Reduction Devices: pressure-redistributing mattress utilized  Skin Protection: incontinence pads utilized  Taken 3/30/2025 2000 by Ebenezer Sotelo LPN  Pressure Reduction Techniques: frequent weight shift encouraged  Head of Bed (HOB) Positioning: HOB elevated  Pressure Reduction Devices: pressure-redistributing mattress utilized  Skin Protection: incontinence pads utilized     Problem: Fall Injury Risk  Goal: Absence of Fall and Fall-Related Injury  Outcome: Progressing  Intervention: Identify and Manage Contributors  Recent Flowsheet Documentation  Taken 3/30/2025 2000 by Ebenezer Sotelo LPN  Medication Review/Management: medications reviewed  Intervention: Promote Injury-Free Environment  Recent Flowsheet Documentation  Taken 3/31/2025 0400 by Ebenezer Sotelo LPN  Safety Promotion/Fall Prevention: safety round/check completed  Taken 3/31/2025 0158 by Ebenezer Sotelo LPN  Safety Promotion/Fall Prevention: safety round/check completed  Taken 3/31/2025 0000 by Ebenezer Sotelo LPN  Safety Promotion/Fall Prevention: safety round/check completed  Taken 3/30/2025 2200 by Ebenezer Sotelo LPN  Safety Promotion/Fall Prevention: safety round/check  completed  Taken 3/30/2025 2000 by Ebenezer Sotelo LPN  Safety Promotion/Fall Prevention: safety round/check completed     Problem: Pneumonia  Goal: Fluid Balance  Outcome: Progressing  Goal: Absence of Infection Signs and Symptoms  Outcome: Progressing  Intervention: Prevent Infection Progression  Recent Flowsheet Documentation  Taken 3/31/2025 0400 by Ebenezer Sotelo LPN Isolation Precautions: precautions maintained  Taken 3/31/2025 0158 by Ebenezer Sotelo LPN Isolation Precautions: precautions maintained  Taken 3/31/2025 0000 by Ebenezer Sotelo LPN  Isolation Precautions: precautions maintained  Taken 3/30/2025 2200 by Ebenezer Sotelo LPN Isolation Precautions: precautions maintained  Taken 3/30/2025 2000 by Ebenezer Sotelo LPN Isolation Precautions: precautions maintained  Goal: Effective Oxygenation and Ventilation  Outcome: Progressing  Intervention: Optimize Oxygenation and Ventilation  Recent Flowsheet Documentation  Taken 3/31/2025 0400 by Ebenezer Sotelo LPN  Head of Bed (HOB) Positioning: HOB elevated  Taken 3/31/2025 0000 by Ebenezer Sotelo LPN  Head of Bed (HOB) Positioning: HOB elevated  Taken 3/30/2025 2000 by Ebenezer Sotelo LPN  Head of Bed (HOB) Positioning: HOB elevated   Goal Outcome Evaluation:           Progress: improving   Pt c/o of heartburn at the start of shift. PRN medications administered per MAR. Pt currently on 2L NC. Pt removed NC for awhile due to irritation, maintaing O2 sats in the 90's without oxygen. Pt resting comfortably at this time. Will continue to monitor..

## 2025-04-01 ENCOUNTER — TRANSITIONAL CARE MANAGEMENT TELEPHONE ENCOUNTER (OUTPATIENT)
Dept: CALL CENTER | Facility: HOSPITAL | Age: 55
End: 2025-04-01
Payer: MEDICAID

## 2025-04-01 RX ORDER — AMITRIPTYLINE HYDROCHLORIDE 75 MG/1
75 TABLET ORAL NIGHTLY
Qty: 10 TABLET | Refills: 0 | Status: SHIPPED | OUTPATIENT
Start: 2025-04-01

## 2025-04-01 NOTE — OUTREACH NOTE
Call Center TCM Note      Flowsheet Row Responses   [facility] facility patient discharged from? [facility]   Does the patient have one of the following disease processes/diagnoses(primary or secondary)? Pneumonia   TCM attempt successful? No   Unsuccessful attempts Attempt 2  [No one listed on PCP verbal release]            JESSICA RAY - Registered Nurse    4/1/2025, 10:58 EDT

## 2025-04-01 NOTE — CASE MANAGEMENT/SOCIAL WORK
Case Management Discharge Note      Final Note: Routine home         Selected Continued Care - Discharged on 3/31/2025 Admission date: 3/26/2025 - Discharge disposition: Home or Self Care                Transportation Services  Private: Car    Final Discharge Disposition Code: 01 - home or self-care

## 2025-04-01 NOTE — OUTREACH NOTE
Call Center TCM Note      Flowsheet Row Responses   [facility] facility patient discharged from? [facility]   Does the patient have one of the following disease processes/diagnoses(primary or secondary)? Pneumonia   TCM attempt successful? No   Unsuccessful attempts Attempt 1  [No one listed on PCP verbal release]   Call Status Left message            JESSICA RAY - Registered Nurse    4/1/2025, 10:16 EDT

## 2025-04-02 ENCOUNTER — TRANSITIONAL CARE MANAGEMENT TELEPHONE ENCOUNTER (OUTPATIENT)
Dept: CALL CENTER | Facility: HOSPITAL | Age: 55
End: 2025-04-02
Payer: MEDICAID

## 2025-04-02 NOTE — OUTREACH NOTE
Call Center TCM Note      Flowsheet Row Responses   [facility] patient discharged from? [facility]   Does the patient have one of the following disease processes/diagnoses(primary or secondary)? Pneumonia   TCM attempt successful? Yes   Call start time 1009   Call end time 1011   Discharge diagnosis COPD with acute exacerbation, Pneumonia   Meds reviewed with patient/caregiver? Yes   Is the patient having any side effects they believe may be caused by any medication additions or changes? No   Does the patient have all medications ordered at discharge? Yes   Prescription comments New rx Folic acid, updated Benzonatate, Prednisone, Topiramate in place.   Is the patient taking all medications as directed (includes completed medication regime)? Yes   Does the patient have an appointment with their PCP within 7-14 days of discharge? Yes   Has home health visited the patient within 72 hours of discharge? N/A   Pulse Ox monitoring None   Psychosocial issues? No   Did the patient receive a copy of their discharge instructions? Yes   Nursing interventions Reviewed instructions with patient   What is the patient's perception of their health status since discharge? Improving   If the patient is a current smoker, are they able to teach back resources for cessation? Not a smoker   Is the patient/caregiver able to teach back the hierarchy of who to call/visit for symptoms/problems? PCP, Specialist, Home health nurse, Urgent Care, ED, 911 Yes   Is the patient/caregiver able to teach back signs and symptoms of worsening condition: Fever/chills, Shortness of breath, Chest pain   Is the patient/caregiver able to teach back importance of completing antibiotic course of treatment? Yes   TCM call completed? Yes   Wrap up additional comments D/C DX: COPD with acute exacerbation, Pneumonia - Pt states she is feeling a little better, still very hoarse, and alot of head congestion. Pt is already scheduled for reg ov tomorrow with PCP   Chantal Benitez and will keep this appt, which ofcourse does fall within TCM APPT time frame.   Call end time 1011            JESSICA RAY - Medical Assistant    4/2/2025, 10:14 EDT

## 2025-04-03 RX ORDER — FAMOTIDINE 10 MG/1
10 TABLET ORAL 2 TIMES DAILY
Qty: 60 TABLET | Refills: 0 | OUTPATIENT
Start: 2025-04-03

## 2025-04-03 NOTE — TELEPHONE ENCOUNTER
Per MA phone call, pharmacy requested refill of famotidine.  Patient is seeing GI and is getting a PPI. Will decline refill request.

## 2025-04-09 ENCOUNTER — TELEPHONE (OUTPATIENT)
Dept: FAMILY MEDICINE CLINIC | Facility: CLINIC | Age: 55
End: 2025-04-09

## 2025-04-09 DIAGNOSIS — G47.09 OTHER INSOMNIA: ICD-10-CM

## 2025-04-09 DIAGNOSIS — F17.200 NICOTINE DEPENDENCE, UNSPECIFIED, UNCOMPLICATED: ICD-10-CM

## 2025-04-09 NOTE — TELEPHONE ENCOUNTER
Caller: Kayla Huber    Relationship: Self    Best call back number: [phone]    What is the medical concern/diagnosis: EGD    What specialty or service is being requested: GASTRO    What is the provider, practice or medical service name:   DR. OSMAR SONG    What is the office location: GASTROENTEROLOGY [facility] [place]    What is the office phone number: FAX: [phone]    Any additional details:   PLEASE CALL TO CONFIRM.    PATIENT HAS AN APPT MAY 1ST. WITH GASTRO.

## 2025-04-10 ENCOUNTER — READMISSION MANAGEMENT (OUTPATIENT)
Dept: CALL CENTER | Facility: HOSPITAL | Age: 55
End: 2025-04-10
Payer: MEDICAID

## 2025-04-10 NOTE — OUTREACH NOTE
COPD/PN Week 2 Survey      Flowsheet Row Responses   [facility] patient discharged from? [facility]   Does the patient have one of the following disease processes/diagnoses(primary or secondary)? Pneumonia   Week 2 attempt successful? Yes   Call start time 1325   Call end time 1327   Discharge diagnosis COPD with acute exacerbation, Pneumonia   Is the patient taking all medications as directed (includes completed medication regime)? Yes   Does the patient have a primary care provider?  Yes   Comments regarding PCP states she will see PCP in July   Has the patient kept scheduled appointments due by today? N/A   Has home health visited the patient within 72 hours of discharge? N/A   Psychosocial issues? No   What is the patient's perception of their health status since discharge? Improving   Nursing Interventions Nurse provided patient education   If the patient is a current smoker, are they able to teach back resources for cessation? Not a smoker   Is the patient/caregiver able to teach back the hierarchy of who to call/visit for symptoms/problems? PCP, Specialist, Home health nurse, Urgent Care, ED, 911 Yes   Is the patient/caregiver able to teach back signs and symptoms of worsening condition: Fever/chills, Shortness of breath, Chest pain   Week 2 call completed? Yes   Graduated Yes   Is the patient interested in additional calls from an ambulatory ? No   Would this patient benefit from a Referral to Amb Social Work? No   Wrap up additional comments Brief call, doing well, denies any questions or concerns, no further calls needed.   Call end time 1327            Pauly DOWELL - Registered Nurse

## 2025-04-11 RX ORDER — VARENICLINE TARTRATE 1 MG/1
1 TABLET, FILM COATED ORAL 2 TIMES DAILY
Qty: 60 TABLET | Refills: 3 | Status: SHIPPED | OUTPATIENT
Start: 2025-04-11

## 2025-04-11 RX ORDER — AMITRIPTYLINE HYDROCHLORIDE 75 MG/1
75 TABLET ORAL NIGHTLY
Qty: 90 TABLET | Refills: 0 | Status: SHIPPED | OUTPATIENT
Start: 2025-04-11

## 2025-04-15 ENCOUNTER — TELEPHONE (OUTPATIENT)
Dept: FAMILY MEDICINE CLINIC | Facility: CLINIC | Age: 55
End: 2025-04-15
Payer: MEDICAID

## 2025-04-15 DIAGNOSIS — K76.0 HEPATIC STEATOSIS: ICD-10-CM

## 2025-04-15 DIAGNOSIS — R74.8 ELEVATED LIVER ENZYMES: Primary | ICD-10-CM

## 2025-04-15 NOTE — TELEPHONE ENCOUNTER
"  Urgent referral placed for Dr Song for hepatic steatosis and elevated liver enzymes    ---------------------------------------------------------------  Kayla Huber to [facility] (supporting Myra Gipson MA)        4/14/25  9:59 PM  I had GI years ago, Dr. Galdamez, I have been looking for him. His partner visited me this last time in hospital.  I would like to switch from Dr. Swan back to [place] please  Myra Gipson MA to Kayla Huber  [place]      4/10/25  9:18 AM  Jose Garza!  Please advise the message below per Dr. Benitez regarding your recent referral request,  \"I do not mind placing referral but can we ask her what she wants referral for? Is she having acid reflux and that is why she wants an EGD or is she wanting a colonoscopy done?\"     Thanks  Myra BRASHER    Last read by Kayla Huber at 9:56PM on 4/14/2025.  Me to Kitty Perla MA        4/10/25  8:08 AM  I do not mind placing referral but can we ask her what she wants referral for? Is she having acid reflux and that is why she wants an EGD or is she wanting a colonoscopy done?     4/9/25  2:18 PM  Myra Gipson MA routed this conversation to Me    4/9/25  1:05 PM  Rosalind Hall RegSched Rep routed this conversation to Kitty Perla MA    4/9/25  1:03 PM  Yoana Guerrero RegSched Rep routed this conversation to [facility]  Yoana Guerrero RegSched Rep KT    4/9/25  1:03 PM  Note      Caller: Kayla Huber     Relationship: Self     Best call back number: [phone]     What is the medical concern/diagnosis: EGD     What specialty or service is being requested: GASTRO     What is the provider, practice or medical service name:   DR. OSMAR SONG     What is the office location: GASTROENTEROLOGY [facility] [place]     What is the office phone number: FAX: [phone]     Any additional details:   PLEASE CALL TO CONFIRM.     PATIENT HAS AN APPT " MAY 1ST. WITH GASTRO.

## 2025-05-01 ENCOUNTER — OFFICE (OUTPATIENT)
Dept: URBAN - METROPOLITAN AREA CLINIC 64 | Facility: CLINIC | Age: 55
End: 2025-05-01
Payer: COMMERCIAL

## 2025-05-01 VITALS
HEART RATE: 110 BPM | HEIGHT: 70 IN | WEIGHT: 194 LBS | SYSTOLIC BLOOD PRESSURE: 119 MMHG | DIASTOLIC BLOOD PRESSURE: 78 MMHG

## 2025-05-01 DIAGNOSIS — K21.9 GASTRO-ESOPHAGEAL REFLUX DISEASE WITHOUT ESOPHAGITIS: ICD-10-CM

## 2025-05-01 DIAGNOSIS — K59.00 CONSTIPATION, UNSPECIFIED: ICD-10-CM

## 2025-05-01 DIAGNOSIS — R74.01 ELEVATION OF LEVELS OF LIVER TRANSAMINASE LEVELS: ICD-10-CM

## 2025-05-01 DIAGNOSIS — R13.10 DYSPHAGIA, UNSPECIFIED: ICD-10-CM

## 2025-05-01 PROCEDURE — 99214 OFFICE O/P EST MOD 30 MIN: CPT

## 2025-05-01 RX ORDER — FAMOTIDINE 40 MG/1
80 TABLET, FILM COATED ORAL
Qty: 90 | Refills: 3 | Status: ACTIVE
Start: 2025-05-01

## 2025-05-01 RX ORDER — PANTOPRAZOLE 40 MG/1
TABLET, DELAYED RELEASE ORAL
Qty: 30 | Refills: 11 | Status: ACTIVE

## 2025-05-01 RX ORDER — SORBITOL SOLUTION 70 %
SOLUTION, ORAL MISCELLANEOUS
Qty: 100 | Refills: 0 | Status: ACTIVE
Start: 2025-05-01

## 2025-05-01 RX ORDER — LINACLOTIDE 145 UG/1
145 CAPSULE, GELATIN COATED ORAL
Qty: 30 | Refills: 11 | Status: ACTIVE
Start: 2025-05-01

## 2025-05-07 ENCOUNTER — HOSPITAL ENCOUNTER (OUTPATIENT)
Dept: GENERAL RADIOLOGY | Facility: HOSPITAL | Age: 55
Discharge: HOME | End: 2025-05-07
Payer: MEDICAID

## 2025-05-07 DIAGNOSIS — R13.10 DYSPHAGIA, UNSPECIFIED TYPE: ICD-10-CM

## 2025-05-07 DIAGNOSIS — R10.13 EPIGASTRIC PAIN: ICD-10-CM

## 2025-05-07 DIAGNOSIS — K21.9 GASTRIC REFLUX SYNDROME: ICD-10-CM

## 2025-05-07 DIAGNOSIS — R74.01 ELEVATED TRANSAMINASE LEVEL: ICD-10-CM

## 2025-05-07 PROCEDURE — 74018 RADEX ABDOMEN 1 VIEW: CPT

## 2025-05-08 LAB
CBC WITH DIFFERENTIAL/PLATELET: BASO (ABSOLUTE): 0.1 X10E3/UL (ref 0–0.2)
CBC WITH DIFFERENTIAL/PLATELET: BASOS: 1 %
CBC WITH DIFFERENTIAL/PLATELET: EOS (ABSOLUTE): 0.1 X10E3/UL (ref 0–0.4)
CBC WITH DIFFERENTIAL/PLATELET: EOS: 1 %
CBC WITH DIFFERENTIAL/PLATELET: HEMATOCRIT: 38.8 % (ref 34–46.6)
CBC WITH DIFFERENTIAL/PLATELET: HEMATOLOGY COMMENTS: (no result)
CBC WITH DIFFERENTIAL/PLATELET: HEMOGLOBIN: 12.4 G/DL (ref 11.1–15.9)
CBC WITH DIFFERENTIAL/PLATELET: IMMATURE CELLS: (no result)
CBC WITH DIFFERENTIAL/PLATELET: IMMATURE GRANS (ABS): 0 X10E3/UL (ref 0–0.1)
CBC WITH DIFFERENTIAL/PLATELET: IMMATURE GRANULOCYTES: 1 %
CBC WITH DIFFERENTIAL/PLATELET: LYMPHS (ABSOLUTE): 1.9 X10E3/UL (ref 0.7–3.1)
CBC WITH DIFFERENTIAL/PLATELET: LYMPHS: 24 %
CBC WITH DIFFERENTIAL/PLATELET: MCH: 32.4 PG (ref 26.6–33)
CBC WITH DIFFERENTIAL/PLATELET: MCHC: 32 G/DL (ref 31.5–35.7)
CBC WITH DIFFERENTIAL/PLATELET: MCV: 101 FL — HIGH (ref 79–97)
CBC WITH DIFFERENTIAL/PLATELET: MONOCYTES(ABSOLUTE): 0.5 X10E3/UL (ref 0.1–0.9)
CBC WITH DIFFERENTIAL/PLATELET: MONOCYTES: 6 %
CBC WITH DIFFERENTIAL/PLATELET: NEUTROPHILS (ABSOLUTE): 5.6 X10E3/UL (ref 1.4–7)
CBC WITH DIFFERENTIAL/PLATELET: NEUTROPHILS: 67 %
CBC WITH DIFFERENTIAL/PLATELET: NRBC: (no result)
CBC WITH DIFFERENTIAL/PLATELET: PLATELETS: 294 X10E3/UL (ref 150–450)
CBC WITH DIFFERENTIAL/PLATELET: RBC: 3.83 X10E6/UL (ref 3.77–5.28)
CBC WITH DIFFERENTIAL/PLATELET: RDW: 15.2 % (ref 11.7–15.4)
CBC WITH DIFFERENTIAL/PLATELET: WBC: 8.2 X10E3/UL (ref 3.4–10.8)
COMP. METABOLIC PANEL (14): ALBUMIN: 3.9 G/DL (ref 3.8–4.9)
COMP. METABOLIC PANEL (14): ALKALINE PHOSPHATASE: 239 IU/L — HIGH (ref 44–121)
COMP. METABOLIC PANEL (14): ALT (SGPT): 40 IU/L — HIGH (ref 0–32)
COMP. METABOLIC PANEL (14): AST (SGOT): 79 IU/L — HIGH (ref 0–40)
COMP. METABOLIC PANEL (14): BILIRUBIN, TOTAL: 0.8 MG/DL (ref 0–1.2)
COMP. METABOLIC PANEL (14): BUN/CREATININE RATIO: 6 — LOW (ref 9–23)
COMP. METABOLIC PANEL (14): BUN: 3 MG/DL — LOW (ref 6–24)
COMP. METABOLIC PANEL (14): CALCIUM: 9.3 MG/DL (ref 8.7–10.2)
COMP. METABOLIC PANEL (14): CARBON DIOXIDE, TOTAL: 24 MMOL/L (ref 20–29)
COMP. METABOLIC PANEL (14): CHLORIDE: 95 MMOL/L — LOW (ref 96–106)
COMP. METABOLIC PANEL (14): CREATININE: 0.5 MG/DL — LOW (ref 0.57–1)
COMP. METABOLIC PANEL (14): EGFR: 112 ML/MIN/1.73 (ref 59–?)
COMP. METABOLIC PANEL (14): GLOBULIN, TOTAL: 2.7 G/DL (ref 1.5–4.5)
COMP. METABOLIC PANEL (14): GLUCOSE: 162 MG/DL — HIGH (ref 70–99)
COMP. METABOLIC PANEL (14): POTASSIUM: 3.7 MMOL/L (ref 3.5–5.2)
COMP. METABOLIC PANEL (14): PROTEIN, TOTAL: 6.6 G/DL (ref 6–8.5)
COMP. METABOLIC PANEL (14): SODIUM: 137 MMOL/L (ref 134–144)
PROTHROMBIN TIME (PT): INR: 1 (ref 0.9–1.2)
PROTHROMBIN TIME (PT): PROTHROMBIN TIME: 10.8 SEC (ref 9.1–12)

## 2025-05-22 ENCOUNTER — TELEPHONE (OUTPATIENT)
Dept: ONCOLOGY | Facility: CLINIC | Age: 55
End: 2025-05-22

## 2025-05-22 NOTE — TELEPHONE ENCOUNTER
Caller: Kayla Huber    Relationship:  Self    Best call back number: [phone]    PATIENT CALLED REQUESTING TO CANCEL SAME DAY APPT.    Did the patient call AFTER the start time of their scheduled appointment?  []YES  [x]NO      Any additional information: PATIENT IS SICK, PLEASE CALL BACK TO RESCHEDULE

## 2025-06-10 ENCOUNTER — TELEPHONE (OUTPATIENT)
Dept: FAMILY MEDICINE CLINIC | Facility: CLINIC | Age: 55
End: 2025-06-10

## 2025-06-10 ENCOUNTER — TELEPHONE (OUTPATIENT)
Dept: FAMILY MEDICINE CLINIC | Facility: CLINIC | Age: 55
End: 2025-06-10
Payer: MEDICAID
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
## 2025-06-10 NOTE — TELEPHONE ENCOUNTER
Spoke to pt 06/10/2025, 6:28 pm requested pt come in for her appt on 06/13/2025 at 10:15 am not 10:30 am, pt agreed

## 2025-06-10 NOTE — TELEPHONE ENCOUNTER
Spoke to pt 06/10/2025, 2:10 pm advised pt we received FMLA paperwork from [employer].    Advised pt she needs an appt as she has not been seen in office since 03/20/2025.  Paperwork is due 06/13/2025    Appt made for 06/13/2025

## 2025-06-11 ENCOUNTER — OFFICE VISIT (OUTPATIENT)
Dept: ONCOLOGY | Facility: CLINIC | Age: 55
End: 2025-06-11
Payer: OTHER GOVERNMENT

## 2025-06-11 ENCOUNTER — LAB (OUTPATIENT)
Dept: LAB | Facility: HOSPITAL | Age: 55
End: 2025-06-11
Payer: OTHER GOVERNMENT

## 2025-06-11 VITALS
OXYGEN SATURATION: 100 % | TEMPERATURE: 98.2 F | HEART RATE: 111 BPM | WEIGHT: 197.8 LBS | BODY MASS INDEX: 29.3 KG/M2 | DIASTOLIC BLOOD PRESSURE: 76 MMHG | SYSTOLIC BLOOD PRESSURE: 138 MMHG | HEIGHT: 69 IN

## 2025-06-11 DIAGNOSIS — R16.1 SPLENOMEGALY: ICD-10-CM

## 2025-06-11 DIAGNOSIS — R79.89 ELEVATED FERRITIN: ICD-10-CM

## 2025-06-11 DIAGNOSIS — D53.9 MACROCYTIC ANEMIA: Primary | ICD-10-CM

## 2025-06-11 LAB
ALBUMIN SERPL-MCNC: 3.5 G/DL (ref 3.5–5.2)
ALBUMIN/GLOB SERPL: 1.1 G/DL
ALP SERPL-CCNC: 440 U/L (ref 39–117)
ALT SERPL W P-5'-P-CCNC: 25 U/L (ref 1–33)
ANION GAP SERPL CALCULATED.3IONS-SCNC: 16.9 MMOL/L (ref 5–15)
AST SERPL-CCNC: 98 U/L (ref 1–32)
BASOPHILS # BLD AUTO: 0.04 10*3/MM3 (ref 0–0.2)
BASOPHILS NFR BLD AUTO: 0.4 % (ref 0–1.5)
BILIRUB SERPL-MCNC: 1.5 MG/DL (ref 0–1.2)
BUN SERPL-MCNC: 3.5 MG/DL (ref 6–20)
BUN/CREAT SERPL: 6.9 (ref 7–25)
CALCIUM SPEC-SCNC: 9 MG/DL (ref 8.6–10.5)
CHLORIDE SERPL-SCNC: 96 MMOL/L (ref 98–107)
CO2 SERPL-SCNC: 26.1 MMOL/L (ref 22–29)
CREAT SERPL-MCNC: 0.51 MG/DL (ref 0.57–1)
DEPRECATED RDW RBC AUTO: 58.7 FL (ref 37–54)
EGFRCR SERPLBLD CKD-EPI 2021: 111.8 ML/MIN/1.73
EOSINOPHIL # BLD AUTO: 0.06 10*3/MM3 (ref 0–0.4)
EOSINOPHIL NFR BLD AUTO: 0.6 % (ref 0.3–6.2)
ERYTHROCYTE [DISTWIDTH] IN BLOOD BY AUTOMATED COUNT: 15.8 % (ref 12.3–15.4)
FERRITIN SERPL-MCNC: 163 NG/ML (ref 13–150)
FOLATE SERPL-MCNC: 5.68 NG/ML (ref 4.78–24.2)
GLOBULIN UR ELPH-MCNC: 3.2 GM/DL
GLUCOSE SERPL-MCNC: 174 MG/DL (ref 65–99)
HCT VFR BLD AUTO: 34.8 % (ref 34–46.6)
HGB BLD-MCNC: 11.2 G/DL (ref 12–15.9)
IRON 24H UR-MRATE: 140 MCG/DL (ref 37–145)
IRON SATN MFR SERPL: 50 % (ref 20–50)
LYMPHOCYTES # BLD AUTO: 1.52 10*3/MM3 (ref 0.7–3.1)
LYMPHOCYTES NFR BLD AUTO: 15.7 % (ref 19.6–45.3)
MCH RBC QN AUTO: 33.3 PG (ref 26.6–33)
MCHC RBC AUTO-ENTMCNC: 32.2 G/DL (ref 31.5–35.7)
MCV RBC AUTO: 103.6 FL (ref 79–97)
MONOCYTES # BLD AUTO: 0.59 10*3/MM3 (ref 0.1–0.9)
MONOCYTES NFR BLD AUTO: 6.1 % (ref 5–12)
NEUTROPHILS NFR BLD AUTO: 7.5 10*3/MM3 (ref 1.7–7)
NEUTROPHILS NFR BLD AUTO: 77.2 % (ref 42.7–76)
PLATELET # BLD AUTO: 298 10*3/MM3 (ref 140–450)
PMV BLD AUTO: 9.3 FL (ref 6–12)
POTASSIUM SERPL-SCNC: 2.8 MMOL/L (ref 3.5–5.2)
PROT SERPL-MCNC: 6.7 G/DL (ref 6–8.5)
RBC # BLD AUTO: 3.36 10*6/MM3 (ref 3.77–5.28)
SODIUM SERPL-SCNC: 139 MMOL/L (ref 136–145)
TIBC SERPL-MCNC: 282 MCG/DL (ref 298–536)
TRANSFERRIN SERPL-MCNC: 189 MG/DL (ref 200–360)
VIT B12 BLD-MCNC: 499 PG/ML (ref 211–946)
WBC NRBC COR # BLD AUTO: 9.71 10*3/MM3 (ref 3.4–10.8)

## 2025-06-11 PROCEDURE — 83540 ASSAY OF IRON: CPT | Performed by: INTERNAL MEDICINE

## 2025-06-11 PROCEDURE — 82746 ASSAY OF FOLIC ACID SERUM: CPT | Performed by: PHYSICIAN ASSISTANT

## 2025-06-11 PROCEDURE — 85025 COMPLETE CBC W/AUTO DIFF WBC: CPT

## 2025-06-11 PROCEDURE — 99214 OFFICE O/P EST MOD 30 MIN: CPT | Performed by: PHYSICIAN ASSISTANT

## 2025-06-11 PROCEDURE — 36415 COLL VENOUS BLD VENIPUNCTURE: CPT

## 2025-06-11 PROCEDURE — 82607 VITAMIN B-12: CPT | Performed by: PHYSICIAN ASSISTANT

## 2025-06-11 PROCEDURE — 82728 ASSAY OF FERRITIN: CPT | Performed by: INTERNAL MEDICINE

## 2025-06-11 PROCEDURE — 1125F AMNT PAIN NOTED PAIN PRSNT: CPT | Performed by: PHYSICIAN ASSISTANT

## 2025-06-11 PROCEDURE — 1159F MED LIST DOCD IN RCRD: CPT | Performed by: PHYSICIAN ASSISTANT

## 2025-06-11 PROCEDURE — 84466 ASSAY OF TRANSFERRIN: CPT | Performed by: INTERNAL MEDICINE

## 2025-06-11 PROCEDURE — 80053 COMPREHEN METABOLIC PANEL: CPT | Performed by: INTERNAL MEDICINE

## 2025-06-11 PROCEDURE — 1160F RVW MEDS BY RX/DR IN RCRD: CPT | Performed by: PHYSICIAN ASSISTANT

## 2025-06-12 ENCOUNTER — TELEPHONE (OUTPATIENT)
Dept: FAMILY MEDICINE CLINIC | Facility: CLINIC | Age: 55
End: 2025-06-12
Payer: OTHER GOVERNMENT

## 2025-06-12 NOTE — PROGRESS NOTES
Subjective   Kayla Huber is a 54 y.o. female.   Chief Complaint   Patient presents with    FMLA       History of Present Illness       Right Hearing loss  - Patient states she cannot hear out of her right ear. Has difficulty popping the ear  - States she takes Zyrtec and Flonase daily but is not taking Singulair currently      Elevated ferritin  -Iron profile was checked 11 months ago, 8 months ago and 6 months ago and on all 3 checks iron levels were elevated, ferritin had gone from 271 11 months ago up to 667 8 months ago.  Placed referral to hematology oncology for concern of hemochromatosis  - Patient met with hematology/oncology on 1/13/2025.  Iron recheck showed that this had come back into normal range, ferritin was still elevated.  Per heme-onc note, they did not feel that patient had hemochromatosis but this was due to chronic liver disease and communicated to patient's GI provider about their suspicions.  - Patient states she was seen for follow-up at hematology oncology's office due to elevated ferritin.  Repeat iron panel came back showing iron was in normal range, TIBC slightly low at 282 and ferritin had improved from 675 to 163.  They are investigating macrocytic anemia    -  Patient states when she saw GI that there was no discussion about liver disease        Preventative  - Patient has new OB/GYN at [facility] and got a Pap smear collected and mammogram ordered.  Signed transfer of information form to get records added to chart    The following portions of the patient's history were reviewed and updated as appropriate: allergies, current medications, past family history, past medical history, past social history, past surgical history, and problem list.    Patient Active Problem List   Diagnosis    Intractable chronic migraine without aura and with status migrainosus    Insomnia    Anxiety and depression    DDD (degenerative disc disease), cervical    Chronic left shoulder pain     Vitamin D deficiency    Mixed hyperlipidemia    Overweight with body mass index (BMI) of 26 to 26.9 in adult    Tobacco use disorder    Pneumonia       Current Outpatient Medications on File Prior to Visit   Medication Sig Dispense Refill    albuterol (ACCUNEB) 0.63 MG/3ML nebulizer solution USE 1 VIAL IN NEBULIZER EVERY 6 HOURS AS NEEDED FOR WHEEZING 360 mL 0    albuterol sulfate  (90 Base) MCG/ACT inhaler Inhale 2 puffs Every 4 (Four) Hours As Needed for Wheezing. 18 g 0    amitriptyline (ELAVIL) 75 MG tablet TAKE 1 TABLET BY MOUTH ONCE DAILY AT NIGHT 90 tablet 0    cetirizine (zyrTEC) 10 MG tablet Take 1 tablet by mouth Daily. 90 tablet 3    famotidine (PEPCID) 40 MG tablet       Fluticasone Propionate, Inhal, (Fluticasone Propionate Diskus) 50 MCG/ACT aerosol powder  INHALE 1 PUFF TWICE DAILY 60 each 0    folic acid (FOLVITE) 1 MG tablet Take 1 tablet by mouth Daily. 30 tablet 2    meclizine (ANTIVERT) 25 MG tablet Take 1 tablet by mouth 3 (Three) Times a Day As Needed for Dizziness.      pantoprazole (PROTONIX) 40 MG EC tablet TAKE 1 TABLET BY MOUTH ONCE DAILY 1/2 TO 1 HOUR BEFORE MORNING MEAL      potassium chloride (KLOR-CON M20) 20 MEQ CR tablet Take 2 tablets by mouth Daily for 3 days. 6 tablet 0    rizatriptan (MAXALT) 10 MG tablet Take 1 tablet by mouth As Needed.      rOPINIRole (REQUIP) 2 MG tablet Take  by mouth Every Night. Medication was increased to 4 mg by podiatrist      tiZANidine (ZANAFLEX) 2 MG tablet Take 1 tablet by mouth At Night As Needed for Muscle Spasms.      topiramate (TOPAMAX) 200 MG tablet Take 1 tablet by mouth 2 (Two) Times a Day.      Umeclidinium Bromide (INCRUSE ELLIPTA) 62.5 MCG/ACT aerosol powder  Inhale 1 puff Daily. 30 each 0    varenicline (CHANTIX) 1 MG tablet Take 1 tablet by mouth twice daily 60 tablet 3    [DISCONTINUED] benzonatate (Tessalon Perles) 100 MG capsule Take 2 capsules by mouth 3 (Three) Times a Day As Needed for Cough. (Patient not taking:  "Reported on 6/11/2025) 42 capsule 0    [DISCONTINUED] DULoxetine (CYMBALTA) 60 MG capsule Take 2 capsules by mouth Daily. (Patient not taking: Reported on 6/11/2025) 180 capsule 1    [DISCONTINUED] metoprolol tartrate (LOPRESSOR) 100 MG tablet Take 100 mg at Bedtime the Night Before Coronary CTA Appointment and In the Morning 1 Hour Prior to Coronary CTA Appointment. Do not take if heart rate less than 60. (Patient not taking: Reported on 6/11/2025) 2 tablet 0     No current facility-administered medications on file prior to visit.     Current outpatient and discharge medications have been reconciled for the patient.  Reviewed by: Chantal Benitez DO      Allergies   Allergen Reactions    Amoxicillin Unknown - Low Severity    Penicillins Hives     CAN TAKE KEFLEX AND AMOXICILLIN         Objective   Visit Vitals  /64 (BP Location: Right arm, Patient Position: Sitting, Cuff Size: Large Adult)   Pulse 101   Temp 98.6 °F (37 °C) (Skin)   Resp 16   Ht 175.3 cm (69\")   Wt 90.4 kg (199 lb 3.2 oz)   SpO2 97%   BMI 29.42 kg/m²       Physical Exam  HENT:      Head: Normocephalic and atraumatic.      Right Ear: There is impacted cerumen.      Ears:      Comments: - Serous fluid behind bilateral tympanic membrane's.  No developing erythema or purulence noted  Eyes:      Conjunctiva/sclera: Conjunctivae normal.   Neurological:      General: No focal deficit present.      Mental Status: She is alert and oriented to person, place, and time.   Psychiatric:         Mood and Affect: Mood normal.         Behavior: Behavior normal.     Ear Cerumen Removal    Date/Time: 6/13/2025 12:57 PM    Performed by: Chantal Benitez DO  Authorized by: Chantal Benitez DO  Location details: right ear  Patient tolerance: patient tolerated the procedure well with no immediate complications  Procedure type: instrumentation, irrigation, curette   Sedation:  Patient sedated: no            Diagnoses and all orders for this visit:    1. Acute hearing " loss of right ear (Primary)  -     Ambulatory Referral to ENT (Otolaryngology): [facility]  -Discussed with patient that cerumen and extra fluid in the ear can impact hearing.  Addressed allergies with montelukast, asked her to keep up with Zyrtec and Flonase.  Sending in Medrol Dosepak.  If these are not helpful to go ahead with the ENT referral  -     montelukast (Singulair) 10 MG tablet; Take 1 tablet by mouth Every Night.  Dispense: 30 tablet; Refill: 1  -     methylPREDNISolone (MEDROL) 4 MG dose pack; Take as directed on package instructions.  Dispense: 21 tablet; Refill: 0    2. Environmental allergies  - Patient to keep up Zyrtec and Flonase  - Added montelukast (Singulair) 10 MG tablet; Take 1 tablet by mouth Every Night.  Dispense: 30 tablet; Refill: 1  -     methylPREDNISolone (MEDROL) 4 MG dose pack; Take as directed on package instructions.  Dispense: 21 tablet; Refill: 0    3. Impacted cerumen of right ear  -     Ear Cerumen Removal: Successful removal of cerumen.  No erythema or purulence noted but serous fluid noted in both ears  - Addressed allergies per above        Follow Up  - 7/10/2025 for annual physical exam and right ear hearing loss    Expected course, medications, and adverse effects discussed as appropriate.  Call or return if worsening or persistent symptoms. Hand hygiene was performed before donning protective equipment and after removal when leaving the room.    This document is intended for medical expert use only. Reading of this document by patients and/or patient's family without participating medical staff guidance may result in misinterpretation and unintended morbidity. Any interpretation of such data is the responsibility of the patient and/or family member responsible for the patient in concert with their primary or specialist providers, not to be left for sources of online searches such as [employer], [employer] or similar queries. Relying on these approaches to knowledge  may result in misinterpretation, misguided goals of care and even death should patients or family members try recommendations outside of the realm of professional medical care.

## 2025-06-12 NOTE — TELEPHONE ENCOUNTER
[facility]    Relationship:     Best call back number:   [phone] Remove      EXT [phone]       What is the best time to reach you: ANYTIME    Who are you requesting to speak with (clinical staff, provider,  specific staff member): CLINICAL     What was the call regarding: ELOISA STATES THEY NEED TO SEE IF THE OFFICE RECEIVED THE FORMS THEY FAXED TO THE OFFICE OF ATTENDING PHYSICIANS STATEMENT. THEY NEED IT FILLED OUT AND RETURNED AS WELL AS OFFICE NOTES FROM 01/01/2025-06/10/2025    FAX [phone]    Is it okay if the provider responds through MyChart: NO

## 2025-06-12 NOTE — TELEPHONE ENCOUNTER
Called [employer] 06/12/2025, 3:36 pm advised them pt has an appt 06/13/2025, per paperwork we received it states due 06/13/2025.    Also advised them pt has not been seen since 03/20/2025

## 2025-06-13 ENCOUNTER — OFFICE VISIT (OUTPATIENT)
Dept: FAMILY MEDICINE CLINIC | Facility: CLINIC | Age: 55
End: 2025-06-13
Payer: OTHER GOVERNMENT

## 2025-06-13 VITALS
OXYGEN SATURATION: 97 % | HEIGHT: 69 IN | WEIGHT: 199.2 LBS | BODY MASS INDEX: 29.51 KG/M2 | DIASTOLIC BLOOD PRESSURE: 64 MMHG | SYSTOLIC BLOOD PRESSURE: 118 MMHG | RESPIRATION RATE: 16 BRPM | HEART RATE: 101 BPM | TEMPERATURE: 98.6 F

## 2025-06-13 DIAGNOSIS — G89.29 CHRONIC MIDLINE LOW BACK PAIN WITH RIGHT-SIDED SCIATICA: Primary | ICD-10-CM

## 2025-06-13 DIAGNOSIS — M54.41 CHRONIC MIDLINE LOW BACK PAIN WITH RIGHT-SIDED SCIATICA: Primary | ICD-10-CM

## 2025-06-13 DIAGNOSIS — M25.512 CHRONIC LEFT SHOULDER PAIN: ICD-10-CM

## 2025-06-13 DIAGNOSIS — H91.91 ACUTE HEARING LOSS OF RIGHT EAR: ICD-10-CM

## 2025-06-13 DIAGNOSIS — G89.29 CHRONIC LEFT SHOULDER PAIN: ICD-10-CM

## 2025-06-13 DIAGNOSIS — Z91.09 ENVIRONMENTAL ALLERGIES: ICD-10-CM

## 2025-06-13 DIAGNOSIS — H61.21 IMPACTED CERUMEN OF RIGHT EAR: ICD-10-CM

## 2025-06-13 RX ORDER — FAMOTIDINE 40 MG/1
TABLET, FILM COATED ORAL
COMMUNITY
Start: 2025-06-11

## 2025-06-13 RX ORDER — MONTELUKAST SODIUM 10 MG/1
10 TABLET ORAL NIGHTLY
Qty: 30 TABLET | Refills: 1 | Status: SHIPPED | OUTPATIENT
Start: 2025-06-13

## 2025-06-13 RX ORDER — METHYLPREDNISOLONE 4 MG/1
TABLET ORAL
Qty: 21 TABLET | Refills: 0 | Status: SHIPPED | OUTPATIENT
Start: 2025-06-13

## 2025-06-13 NOTE — PROGRESS NOTES
Subjective   Kayla Huber is a 54 y.o. female.   Chief Complaint   Patient presents with    FMLA       History of Present Illness     Chronic low back pain/chronic left shoulder pain / FMLA  -Follow up from 01/23/2025:  Pt   is established with [facility]  -Pt states Duloxetine was discontinued by another doctor.  Today  - Pt presents today to update FMLA paperwork  - Pt states [facility] is considering an nerve ablation for her back pain.  Has gotten 2 injections, 1 last week as they are moving toward ablation  - Patient states she got released from [employer].  Plans to file for Social Security.  Requesting extension of FMLA      Bilateral lower extremity neuropathy  -Patient sees podiatry (Dr Dickerson) who has tried gabapentin and give patient ropinirole.  Patient states she also has RLS  - Patient reports she has had an EMG done  - Neuropathy staying persistent, podiatry recommended soaking legs in hot and cold water and has given injection        The following portions of the patient's history were reviewed and updated as appropriate: allergies, current medications, past family history, past medical history, past social history, past surgical history, and problem list.    Patient Active Problem List   Diagnosis    Intractable chronic migraine without aura and with status migrainosus    Insomnia    Anxiety and depression    DDD (degenerative disc disease), cervical    Chronic left shoulder pain    Vitamin D deficiency    Mixed hyperlipidemia    Overweight with body mass index (BMI) of 26 to 26.9 in adult    Tobacco use disorder    Pneumonia       Current Outpatient Medications on File Prior to Visit   Medication Sig Dispense Refill    albuterol (ACCUNEB) 0.63 MG/3ML nebulizer solution USE 1 VIAL IN NEBULIZER EVERY 6 HOURS AS NEEDED FOR WHEEZING 360 mL 0    albuterol sulfate  (90 Base) MCG/ACT inhaler Inhale 2 puffs Every 4 (Four) Hours As Needed for Wheezing. 18  g 0    amitriptyline (ELAVIL) 75 MG tablet TAKE 1 TABLET BY MOUTH ONCE DAILY AT NIGHT 90 tablet 0    cetirizine (zyrTEC) 10 MG tablet Take 1 tablet by mouth Daily. 90 tablet 3    famotidine (PEPCID) 40 MG tablet       Fluticasone Propionate, Inhal, (Fluticasone Propionate Diskus) 50 MCG/ACT aerosol powder  INHALE 1 PUFF TWICE DAILY 60 each 0    folic acid (FOLVITE) 1 MG tablet Take 1 tablet by mouth Daily. 30 tablet 2    meclizine (ANTIVERT) 25 MG tablet Take 1 tablet by mouth 3 (Three) Times a Day As Needed for Dizziness.      pantoprazole (PROTONIX) 40 MG EC tablet TAKE 1 TABLET BY MOUTH ONCE DAILY 1/2 TO 1 HOUR BEFORE MORNING MEAL      potassium chloride (KLOR-CON M20) 20 MEQ CR tablet Take 2 tablets by mouth Daily for 3 days. 6 tablet 0    rizatriptan (MAXALT) 10 MG tablet Take 1 tablet by mouth As Needed.      rOPINIRole (REQUIP) 2 MG tablet Take  by mouth Every Night. Medication was increased to 4 mg by podiatrist      tiZANidine (ZANAFLEX) 2 MG tablet Take 1 tablet by mouth At Night As Needed for Muscle Spasms.      topiramate (TOPAMAX) 200 MG tablet Take 1 tablet by mouth 2 (Two) Times a Day.      Umeclidinium Bromide (INCRUSE ELLIPTA) 62.5 MCG/ACT aerosol powder  Inhale 1 puff Daily. 30 each 0    varenicline (CHANTIX) 1 MG tablet Take 1 tablet by mouth twice daily 60 tablet 3    [DISCONTINUED] benzonatate (Tessalon Perles) 100 MG capsule Take 2 capsules by mouth 3 (Three) Times a Day As Needed for Cough. (Patient not taking: Reported on 6/11/2025) 42 capsule 0    [DISCONTINUED] DULoxetine (CYMBALTA) 60 MG capsule Take 2 capsules by mouth Daily. (Patient not taking: Reported on 6/11/2025) 180 capsule 1    [DISCONTINUED] metoprolol tartrate (LOPRESSOR) 100 MG tablet Take 100 mg at Bedtime the Night Before Coronary CTA Appointment and In the Morning 1 Hour Prior to Coronary CTA Appointment. Do not take if heart rate less than 60. (Patient not taking: Reported on 6/11/2025) 2 tablet 0     No current  "facility-administered medications on file prior to visit.     Current outpatient and discharge medications have been reconciled for the patient.  Reviewed by: Chantal Benitez,       Allergies   Allergen Reactions    Amoxicillin Unknown - Low Severity    Penicillins Hives     CAN TAKE KEFLEX AND AMOXICILLIN         Objective   Visit Vitals  /64 (BP Location: Right arm, Patient Position: Sitting, Cuff Size: Large Adult)   Pulse 101   Temp 98.6 °F (37 °C) (Skin)   Resp 16   Ht 175.3 cm (69\")   Wt 90.4 kg (199 lb 3.2 oz)   SpO2 97%   BMI 29.42 kg/m²       Physical Exam  HENT:      Head: Normocephalic and atraumatic.   Eyes:      Conjunctiva/sclera: Conjunctivae normal.   Neurological:      General: No focal deficit present.      Mental Status: She is alert and oriented to person, place, and time.   Psychiatric:         Mood and Affect: Mood normal.         Behavior: Behavior normal.           Diagnoses and all orders for this visit:    1. Chronic midline low back pain with right-sided sciatica (Primary)/  Chronic left shoulder pain  - [facility] paperwork extended to 11/15/2025.  Filled out, signed and will be faxed  - Patient states she will plan to apply for Social Security      Expected course, medications, and adverse effects discussed as appropriate.  Call or return if worsening or persistent symptoms. Hand hygiene was performed before donning protective equipment and after removal when leaving the room.    This document is intended for medical expert use only. Reading of this document by patients and/or patient's family without participating medical staff guidance may result in misinterpretation and unintended morbidity. Any interpretation of such data is the responsibility of the patient and/or family member responsible for the patient in concert with their primary or specialist providers, not to be left for sources of online searches such as [employer], [employer] or similar queries. Relying on these approaches to " knowledge may result in misinterpretation, misguided goals of care and even death should patients or family members try recommendations outside of the realm of professional medical care.

## 2025-06-18 ENCOUNTER — TELEPHONE (OUTPATIENT)
Dept: FAMILY MEDICINE CLINIC | Facility: CLINIC | Age: 55
End: 2025-06-18
Payer: OTHER GOVERNMENT

## 2025-06-18 DIAGNOSIS — H91.91 ACUTE HEARING LOSS OF RIGHT EAR: ICD-10-CM

## 2025-06-18 DIAGNOSIS — Z91.09 ENVIRONMENTAL ALLERGIES: ICD-10-CM

## 2025-06-18 RX ORDER — METHYLPREDNISOLONE 4 MG/1
TABLET ORAL
Qty: 21 TABLET | Refills: 0 | Status: SHIPPED | OUTPATIENT
Start: 2025-06-18

## 2025-06-18 RX ORDER — MONTELUKAST SODIUM 10 MG/1
10 TABLET ORAL NIGHTLY
Qty: 30 TABLET | Refills: 1 | Status: SHIPPED | OUTPATIENT
Start: 2025-06-18

## 2025-06-18 NOTE — TELEPHONE ENCOUNTER
Caller: JUAN LUIS BOWDEN    Relationship: Mother    Best call back number: [phone]    Requested Prescriptions:   Requested Prescriptions     Pending Prescriptions Disp Refills    methylPREDNISolone (MEDROL) 4 MG dose pack 21 tablet 0     Sig: Take as directed on package instructions.    montelukast (Singulair) 10 MG tablet 30 tablet 1     Sig: Take 1 tablet by mouth Every Night.        Pharmacy where request should be sent: [facility] [address] [place] [address] [address] [facility] [address] [facility] [phone] [facility] [phone] FX     Last office visit with prescribing clinician: 6/13/2025   Last telemedicine visit with prescribing clinician: Visit date not found   Next office visit with prescribing clinician: 7/10/2025     Additional details provided by patient: [facility] STATED THEY NEVER RECEIVED EITHER OF THESE.    Does the patient have less than a 3 day supply:  [x] Yes  [] No    Would you like a call back once the refill request has been completed: [] Yes [x] No    If the office needs to give you a call back, can they leave a voicemail: [] Yes [x] No    Keny Denson Rep   06/18/25 15:18 EDT

## 2025-06-25 ENCOUNTER — TELEPHONE (OUTPATIENT)
Dept: FAMILY MEDICINE CLINIC | Facility: CLINIC | Age: 55
End: 2025-06-25

## 2025-06-25 NOTE — TELEPHONE ENCOUNTER
Caller: [employer]    Relationship: Other    Best call back number: [phone] [employer] #[phone]    What is the best time to reach you: ANYTIME    Who are you requesting to speak with (clinical staff, provider,  specific staff member): OFFICE    Do you know the name of the person who called: TIERRA SORIA    What was the call regarding: YANG IS REQUESTING THE OFFICE VISIT NOTE FOR LONG TERM DISABILITY BE FAXED [phone]     THE OFFICE VISIT NOTE DATE OF SERVICE IS 01-01-25 06-10-25
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
## 2025-07-10 ENCOUNTER — OFFICE VISIT (OUTPATIENT)
Dept: FAMILY MEDICINE CLINIC | Facility: CLINIC | Age: 55
End: 2025-07-10
Payer: OTHER GOVERNMENT

## 2025-07-10 ENCOUNTER — TELEPHONE (OUTPATIENT)
Dept: FAMILY MEDICINE CLINIC | Facility: CLINIC | Age: 55
End: 2025-07-10

## 2025-07-10 VITALS
DIASTOLIC BLOOD PRESSURE: 58 MMHG | WEIGHT: 204.6 LBS | HEART RATE: 64 BPM | OXYGEN SATURATION: 98 % | HEIGHT: 69 IN | TEMPERATURE: 98.9 F | BODY MASS INDEX: 30.3 KG/M2 | RESPIRATION RATE: 18 BRPM | SYSTOLIC BLOOD PRESSURE: 112 MMHG

## 2025-07-10 DIAGNOSIS — G47.09 OTHER INSOMNIA: ICD-10-CM

## 2025-07-10 DIAGNOSIS — Z12.31 SCREENING MAMMOGRAM FOR BREAST CANCER: ICD-10-CM

## 2025-07-10 DIAGNOSIS — Z00.00 ANNUAL PHYSICAL EXAM: Primary | ICD-10-CM

## 2025-07-10 DIAGNOSIS — N30.01 ACUTE CYSTITIS WITH HEMATURIA: ICD-10-CM

## 2025-07-10 DIAGNOSIS — F41.9 ANXIETY AND DEPRESSION: ICD-10-CM

## 2025-07-10 DIAGNOSIS — Z13.29 THYROID DISORDER SCREEN: ICD-10-CM

## 2025-07-10 DIAGNOSIS — T88.7XXA DRUG SIDE EFFECTS: ICD-10-CM

## 2025-07-10 DIAGNOSIS — F32.A ANXIETY AND DEPRESSION: ICD-10-CM

## 2025-07-10 DIAGNOSIS — E66.9 OBESITY (BMI 30-39.9): ICD-10-CM

## 2025-07-10 DIAGNOSIS — G47.19 EXCESSIVE DAYTIME SLEEPINESS: ICD-10-CM

## 2025-07-10 DIAGNOSIS — R53.83 OTHER FATIGUE: ICD-10-CM

## 2025-07-10 DIAGNOSIS — K59.09 OTHER CONSTIPATION: ICD-10-CM

## 2025-07-10 DIAGNOSIS — E78.2 MIXED HYPERLIPIDEMIA: ICD-10-CM

## 2025-07-10 DIAGNOSIS — R05.3 PERSISTENT COUGH: ICD-10-CM

## 2025-07-10 DIAGNOSIS — E55.9 VITAMIN D DEFICIENCY: ICD-10-CM

## 2025-07-10 DIAGNOSIS — Z12.11 COLON CANCER SCREENING: ICD-10-CM

## 2025-07-10 DIAGNOSIS — Z13.1 DIABETES MELLITUS SCREENING: ICD-10-CM

## 2025-07-10 DIAGNOSIS — R39.15 URINARY URGENCY: ICD-10-CM

## 2025-07-10 PROBLEM — E66.3 OVERWEIGHT WITH BODY MASS INDEX (BMI) OF 26 TO 26.9 IN ADULT: Status: RESOLVED | Noted: 2024-11-19 | Resolved: 2025-07-10

## 2025-07-10 LAB
BILIRUB BLD-MCNC: ABNORMAL MG/DL
CLARITY, POC: ABNORMAL
COLOR UR: ABNORMAL
GLUCOSE UR STRIP-MCNC: NEGATIVE MG/DL
KETONES UR QL: ABNORMAL
LEUKOCYTE EST, POC: ABNORMAL
NITRITE UR-MCNC: POSITIVE MG/ML
PH UR: 5 [PH] (ref 5–8)
PROT UR STRIP-MCNC: ABNORMAL MG/DL
RBC # UR STRIP: ABNORMAL /UL
SP GR UR: 1.02 (ref 1–1.03)
UROBILINOGEN UR QL: ABNORMAL

## 2025-07-10 RX ORDER — NITROFURANTOIN 25; 75 MG/1; MG/1
100 CAPSULE ORAL 2 TIMES DAILY
Qty: 10 CAPSULE | Refills: 0 | Status: SHIPPED | OUTPATIENT
Start: 2025-07-10 | End: 2025-07-15

## 2025-07-10 RX ORDER — AMITRIPTYLINE HYDROCHLORIDE 100 MG/1
100 TABLET ORAL NIGHTLY
Qty: 30 TABLET | Refills: 2 | Status: SHIPPED | OUTPATIENT
Start: 2025-07-10

## 2025-07-10 RX ORDER — MAG HYDROX/ALUMINUM HYD/SIMETH 400-400-40
1 SUSPENSION, ORAL (FINAL DOSE FORM) ORAL DAILY PRN
Qty: 20 EACH | Refills: 0 | Status: SHIPPED | OUTPATIENT
Start: 2025-07-10

## 2025-07-10 RX ORDER — MELOXICAM 15 MG/1
TABLET ORAL
COMMUNITY
Start: 2025-07-06

## 2025-07-10 RX ORDER — FLUTICASONE PROPIONATE 50 MCG
SPRAY, SUSPENSION (ML) NASAL
COMMUNITY

## 2025-07-10 RX ORDER — ASPIRIN 81 MG/1
TABLET, COATED ORAL
COMMUNITY
Start: 2025-07-08

## 2025-07-10 NOTE — TELEPHONE ENCOUNTER
Please let patient know that her Urine did come back looking suspicious for UTI.  Antibiotic sent to pharmacy

## 2025-07-10 NOTE — PATIENT INSTRUCTIONS
- drink 2.7L - 3L of fluid daily  - start over the counter probiotics daily  - increase your fiber intake by 5g daily and slowly progress up  - do miralax daily for 2 weeks at least  - do about 20 minutes daily of aerobic activity  - simethicone for gas pressure            Standardized Exercise Recommendations  - Work up to 150 minutes of aerobic, moderate intensity (you can talk but you can't sing) or 75 minutes of vigorous activity of dedicated exercise a week  - 2 days a week, include resistance/weight training    Light intensity   - Ex: casual walking, light housework, stretching  Moderate Intensity   - brisk walking, water aerobics, ballroom dancing   - breathing will be a little harder with a faster heartbeat  Vigorous Intensity   - jogging/running, aerobic dancing    What are the benefits of movement?  Moving your body has many benefits. It can:  ?Burn calories, which helps people control their weight  ?Help control blood sugar levels in people with diabetes  ?Lower blood pressure, especially in people with high blood pressure  ?Lower stress and help with depression and anxiety  ?Keep bones strong, so they don't get thin and break easily  ?Lower the chance of dying from heart disease  Adding even small amounts of physical activity to your daily routine can improve your health.    What are the main types of exercise?  There are 3 main types of exercise. They are:  ?Aerobic exercise - Aerobic exercise raises a person's heart rate. Examples of aerobic exercise are walking, running, dancing, riding a bike, or swimming.  ?Resistance training - Resistance training helps make your muscles stronger. People can do this type of exercise using weights, exercise bands, or weight machines. You can also do this type of exercise using your own body weight, as with push-ups, or by lifting items in your home, like jugs of water.   ?Stretching - Stretching exercises help your muscles and joints move more easily.  It's  important to have all 3 types of exercise in your exercise program. That way, your body, muscles, and joints can be as healthy as possible.    Should I talk to my doctor or nurse before I start exercising?  If you have not exercised before or have not exercised in a long time, talk with your doctor or nurse before you start a very active exercise program.  If you have heart disease or risk factors for heart disease (like high blood pressure or diabetes), your doctor or nurse might recommend that you have an exercise test before starting an exercise program.  When you start an exercise program, start slowly. For example, do the exercise at a slow pace or for a few minutes only. Over time, you can exercise faster and for longer periods of time.  What should I do when I exercise? -- Each time you exercise, you should:  ?Warm up - Warming up can help keep you from hurting your muscles when you exercise. To warm up, do a light aerobic exercise (such as walking slowly) or stretch for 5 to 10 minutes.  ?Work out - You should try to get a mix of aerobic exercise, resistance training, and stretching. During an aerobic workout, you can walk fast, swim, run, or use an exercise machine, for example. Other activities, like dancing or playing tennis, are also forms of aerobic exercise. You should also take time to stretch all of your joints, including your neck, shoulders, back, hips, and knees. At least 2 times a week, you can do resistance training exercises as part of your workout.  ?Cool down - Cooling down helps keep you from feeling dizzy after you exercise and helps prevent muscle cramps. To cool down, you can stretch or do a light aerobic exercise for 5 minutes.  Some people go to a gym or do group exercise classes. But you can exercise even without these things. Some exercises can be done even in a small space. You can also try online videos or smartphone apps to get ideas for different types of exercise.     How often  should I exercise?   Doctors recommend that people exercise at least 30 minutes a day, on 5 or more days of the week.  If you can't exercise for 30 minutes straight, try to exercise for 10 minutes at a time, 3 or 4 times a day. Even exercising for shorter amounts of time is good for you, especially if it means spending less time sitting.   When should I call my doctor or nurse? -- If you have any of the following symptoms when you exercise, stop exercising and call your doctor or nurse right away:  ?Pain or pressure in your chest, arms, throat, jaw, or back  ?Nausea or vomiting  ?Feeling like your heart is fluttering or racing very fast  ?Feeling dizzy or faint    What if I don't have time to exercise?   Many people have very busy lives and might not think that they have time to exercise. But it's important to try to find time to exercise, even if you are tired or work a lot. Exercise can increase your energy level, which can make you feel better and might even help you get more work done.  Even if it's hard to set aside a lot of time to exercise, you can still improve your health by moving your body more. There are many ways that you can be more active. For example, you can:  ?Take the stairs instead of the elevator  ?Park in a parking space that is farther away from the door  ?Take a longer route when you walk from one place to another  Spending a lot of time sitting still - for example, watching television or working on the computer - can be bad for your health. Try to get up and move around whenever you can. Even small amounts of movement, like taking short walks, doing household chores, or gardening, can help improve your health. Finding activities you enjoy, or doing them with other people, can help you add more movement into your daily life.    What else should I do when I exercise?   To exercise safely and avoid problems, it's important to:  ?Drink fluids during and after exercising (but avoid drinks with a  lot of caffeine or sugar)  ?Avoid exercising outside if it is too hot or cold  ?Wear layers of clothes, so that you can take them off if you get too hot  ?Wear shoes that fit well and support your feet  ?Be aware of your surroundings if you exercise outside

## 2025-07-13 LAB
BACTERIA UR CULT: ABNORMAL
BACTERIA UR CULT: ABNORMAL

## 2025-07-15 ENCOUNTER — TELEPHONE (OUTPATIENT)
Dept: FAMILY MEDICINE CLINIC | Facility: CLINIC | Age: 55
End: 2025-07-15
Payer: OTHER GOVERNMENT

## 2025-07-15 DIAGNOSIS — R05.3 PERSISTENT COUGH: Primary | ICD-10-CM

## 2025-07-15 LAB
BACTERIA UR CULT: ABNORMAL
BACTERIA UR CULT: ABNORMAL
OTHER ANTIBIOTIC SUSC ISLT: ABNORMAL

## 2025-07-15 RX ORDER — METHYLPREDNISOLONE 4 MG/1
TABLET ORAL
Qty: 21 TABLET | Refills: 0 | Status: SHIPPED | OUTPATIENT
Start: 2025-07-15

## 2025-07-15 NOTE — TELEPHONE ENCOUNTER
Per note, patient was taking inhaler, nebulizers, Zyrtec, Flonase and Singulair along with an increase inhaler.  She saw ENT about 1 to 2 months ago.  Stated that steroids only helped for short while and they return.  Tessalon Perles were not helpful    Had recommended seeing ENT for follow-up as she might need allergy shots at this point.  Since Tessalon Perles were not helpful, I can send in another Medrol Dosepak to help bridge her until she sees ENT again for follow-up

## 2025-07-15 NOTE — TELEPHONE ENCOUNTER
"    Caller: Kayla Huber \"Karin\"    Relationship: Self    Best call back number: [phone]    What medication are you requesting: DRY COUGH     What are your current symptoms: DRY COUGH     How long have you been experiencing symptoms: FEBRUARY     Have you had these symptoms before:      [x] Yes  [] No    Have you been treated for these symptoms before:     [x] Yes  [] No    If a prescription is needed, what is your preferred pharmacy and phone number: [facility] [address] [place] [address] [address] [facility] [address]  - [phone] [facility] [phone]      Additional notes: PATIENT STATES PEARCAMMIE AND ZPAADILENE DOESN'T WORK. SHE THOUGH WHEN SHE WAS SEEN THEY WERE CALLING HER A COUGH SYRUP BUT NOTHING WAS EVER SENT. PLEASE CALL         "

## 2025-07-17 ENCOUNTER — TELEPHONE (OUTPATIENT)
Dept: FAMILY MEDICINE CLINIC | Facility: CLINIC | Age: 55
End: 2025-07-17
Payer: OTHER GOVERNMENT

## 2025-07-17 DIAGNOSIS — R06.02 SOB (SHORTNESS OF BREATH): ICD-10-CM

## 2025-07-17 DIAGNOSIS — R05.3 PERSISTENT COUGH: Primary | ICD-10-CM

## 2025-07-17 NOTE — TELEPHONE ENCOUNTER
"Caller: Kayla Huber \"Karin\"    Relationship: Self    Best call back number: [phone]    What is the medical concern/diagnosis: DEEP COUGH     What specialty or service is being requested: PULMONOLOGY     What is the provider, practice or medical service name: WHOEVER SHE RECOMMENDS     What is the office location:     What is the office phone number:     Any additional details: PLEASE CALL TO CONFIRM OR DENY       "

## 2025-07-18 ENCOUNTER — RESULTS FOLLOW-UP (OUTPATIENT)
Dept: FAMILY MEDICINE CLINIC | Facility: CLINIC | Age: 55
End: 2025-07-18
Payer: OTHER GOVERNMENT

## 2025-07-18 NOTE — TELEPHONE ENCOUNTER
Spoke to pt 07/18/2025, 5:22 pm pt states she was seen by Dr. Horne this week and he advised her to see a pulmonologist.  She will also get established with ENT      Denies being sick, fever, or fatigue.

## 2025-07-18 NOTE — TELEPHONE ENCOUNTER
Last appointment she was describing a persistent cough since about spring this year, she was on several medications for allergies and had seen ENT about 1 to 2 months ago.  States that steroids only helped for short while and Tessalon Perles were not helpful     Had recommended patient reconnect with ENT to see if she needs allergy shots at this point.  When she is describing a deep cough, is she saying that she is getting fevers, fatigue and is generally sick?  If so, we may need to see her and have an x-ray done

## 2025-07-21 PROBLEM — J18.9 PNEUMONIA: Status: RESOLVED | Noted: 2025-03-26 | Resolved: 2025-07-21

## 2025-07-21 NOTE — TELEPHONE ENCOUNTER
If she saw Dr. Horne this week, it looks like there was a no-show on 7/16/2025.  Let me increase the dose of her daily fluticasone inhaler (she has albuterol as needed) and order chest CT.  If I can find something that looks abnormal on the chest CT, that may qualify referral to pulmonology

## 2025-07-21 NOTE — TELEPHONE ENCOUNTER
Spoke to pt 07/21/2025, 10:46 am advised pt per Dr. Benitez she has increased her Fluticasone, and has ordered a chest CT

## 2025-07-25 ENCOUNTER — TELEPHONE (OUTPATIENT)
Dept: FAMILY MEDICINE CLINIC | Facility: CLINIC | Age: 55
End: 2025-07-25
Payer: OTHER GOVERNMENT

## 2025-08-05 ENCOUNTER — TELEPHONE (OUTPATIENT)
Dept: FAMILY MEDICINE CLINIC | Facility: CLINIC | Age: 55
End: 2025-08-05
Payer: OTHER GOVERNMENT

## 2025-08-11 ENCOUNTER — HOSPITAL ENCOUNTER (OUTPATIENT)
Dept: CT IMAGING | Facility: HOSPITAL | Age: 55
Discharge: HOME | End: 2025-08-11
Payer: OTHER GOVERNMENT

## 2025-08-11 ENCOUNTER — HOSPITAL ENCOUNTER (OUTPATIENT)
Dept: MAMMOGRAPHY | Facility: HOSPITAL | Age: 55
Discharge: HOME | End: 2025-08-11
Payer: OTHER GOVERNMENT

## 2025-08-11 PROCEDURE — 77063 BREAST TOMOSYNTHESIS BI: CPT

## 2025-08-11 PROCEDURE — 77067 SCR MAMMO BI INCL CAD: CPT

## 2025-08-11 PROCEDURE — 71250 CT THORAX DX C-: CPT

## 2025-08-22 LAB
25(OH)D3+25(OH)D2 SERPL-MCNC: 28.5 NG/ML (ref 30–100)
ALBUMIN SERPL-MCNC: 3.1 G/DL (ref 3.8–4.9)
ALP SERPL-CCNC: 301 IU/L (ref 44–121)
ALT SERPL-CCNC: 19 IU/L (ref 0–32)
AST SERPL-CCNC: 55 IU/L (ref 0–40)
BASOPHILS # BLD AUTO: 0 X10E3/UL (ref 0–0.2)
BASOPHILS NFR BLD AUTO: 1 %
BILIRUB SERPL-MCNC: 1.1 MG/DL (ref 0–1.2)
BUN SERPL-MCNC: 5 MG/DL (ref 6–24)
BUN/CREAT SERPL: 9 (ref 9–23)
CALCIUM SERPL-MCNC: 8.6 MG/DL (ref 8.7–10.2)
CHLORIDE SERPL-SCNC: 103 MMOL/L (ref 96–106)
CHOLEST SERPL-MCNC: 149 MG/DL (ref 100–199)
CO2 SERPL-SCNC: 21 MMOL/L (ref 20–29)
CREAT SERPL-MCNC: 0.56 MG/DL (ref 0.57–1)
EGFRCR SERPLBLD CKD-EPI 2021: 109 ML/MIN/1.73
EOSINOPHIL # BLD AUTO: 0.2 X10E3/UL (ref 0–0.4)
EOSINOPHIL NFR BLD AUTO: 2 %
ERYTHROCYTE [DISTWIDTH] IN BLOOD BY AUTOMATED COUNT: 12.7 % (ref 11.7–15.4)
FERRITIN SERPL-MCNC: 68 NG/ML (ref 15–150)
GLOBULIN SER CALC-MCNC: 2.9 G/DL (ref 1.5–4.5)
GLUCOSE SERPL-MCNC: 122 MG/DL (ref 70–99)
HBA1C MFR BLD: 4.7 % (ref 4.8–5.6)
HCT VFR BLD AUTO: 34.5 % (ref 34–46.6)
HDLC SERPL-MCNC: 24 MG/DL
HGB BLD-MCNC: 10.7 G/DL (ref 11.1–15.9)
IMM GRANULOCYTES # BLD AUTO: 0 X10E3/UL (ref 0–0.1)
IMM GRANULOCYTES NFR BLD AUTO: 0 %
IRON SATN MFR SERPL: 13 % (ref 15–55)
IRON SERPL-MCNC: 28 UG/DL (ref 27–159)
LDLC SERPL CALC-MCNC: 95 MG/DL (ref 0–99)
LYMPHOCYTES # BLD AUTO: 1.7 X10E3/UL (ref 0.7–3.1)
LYMPHOCYTES NFR BLD AUTO: 21 %
MCH RBC QN AUTO: 29.7 PG (ref 26.6–33)
MCHC RBC AUTO-ENTMCNC: 31 G/DL (ref 31.5–35.7)
MCV RBC AUTO: 96 FL (ref 79–97)
MONOCYTES # BLD AUTO: 0.5 X10E3/UL (ref 0.1–0.9)
MONOCYTES NFR BLD AUTO: 6 %
NEUTROPHILS # BLD AUTO: 5.8 X10E3/UL (ref 1.4–7)
NEUTROPHILS NFR BLD AUTO: 70 %
PLATELET # BLD AUTO: 278 X10E3/UL (ref 150–450)
POTASSIUM SERPL-SCNC: 3.9 MMOL/L (ref 3.5–5.2)
PROT SERPL-MCNC: 6 G/DL (ref 6–8.5)
RBC # BLD AUTO: 3.6 X10E6/UL (ref 3.77–5.28)
SODIUM SERPL-SCNC: 143 MMOL/L (ref 134–144)
T4 FREE SERPL-MCNC: 1.09 NG/DL (ref 0.82–1.77)
TIBC SERPL-MCNC: 215 UG/DL (ref 250–450)
TRIGL SERPL-MCNC: 171 MG/DL (ref 0–149)
TSH SERPL DL<=0.005 MIU/L-ACNC: 5.55 UIU/ML (ref 0.45–4.5)
UIBC SERPL-MCNC: 187 UG/DL (ref 131–425)
VIT B12 SERPL-MCNC: 719 PG/ML (ref 232–1245)
VLDLC SERPL CALC-MCNC: 30 MG/DL (ref 5–40)
WBC # BLD AUTO: 8.2 X10E3/UL (ref 3.4–10.8)

## 2025-08-28 ENCOUNTER — TELEPHONE (OUTPATIENT)
Dept: FAMILY MEDICINE CLINIC | Facility: CLINIC | Age: 55
End: 2025-08-28
Payer: OTHER GOVERNMENT